# Patient Record
Sex: MALE | Race: WHITE | NOT HISPANIC OR LATINO | Employment: FULL TIME | URBAN - METROPOLITAN AREA
[De-identification: names, ages, dates, MRNs, and addresses within clinical notes are randomized per-mention and may not be internally consistent; named-entity substitution may affect disease eponyms.]

---

## 2017-01-09 DIAGNOSIS — J45.30 MILD PERSISTENT ASTHMA: Primary | ICD-10-CM

## 2017-01-09 DIAGNOSIS — E66.3 OVERWEIGHT (BMI 25.0-29.9): ICD-10-CM

## 2017-01-09 DIAGNOSIS — I10 HYPERTENSION GOAL BP (BLOOD PRESSURE) < 140/90: ICD-10-CM

## 2017-01-09 LAB
ANION GAP SERPL CALCULATED.3IONS-SCNC: 4 MMOL/L (ref 3–14)
BUN SERPL-MCNC: 10 MG/DL (ref 7–30)
CALCIUM SERPL-MCNC: 9.2 MG/DL (ref 8.5–10.1)
CHLORIDE SERPL-SCNC: 106 MMOL/L (ref 94–109)
CHOLEST SERPL-MCNC: 216 MG/DL
CO2 SERPL-SCNC: 30 MMOL/L (ref 20–32)
CREAT SERPL-MCNC: 0.96 MG/DL (ref 0.66–1.25)
GFR SERPL CREATININE-BSD FRML MDRD: 82 ML/MIN/1.7M2
GLUCOSE SERPL-MCNC: 100 MG/DL (ref 70–99)
HDLC SERPL-MCNC: 47 MG/DL
LDLC SERPL CALC-MCNC: 130 MG/DL
NONHDLC SERPL-MCNC: 169 MG/DL
POTASSIUM SERPL-SCNC: 4.2 MMOL/L (ref 3.4–5.3)
SODIUM SERPL-SCNC: 140 MMOL/L (ref 133–144)
TRIGL SERPL-MCNC: 197 MG/DL

## 2017-01-09 PROCEDURE — 80061 LIPID PANEL: CPT | Performed by: PHYSICIAN ASSISTANT

## 2017-01-09 PROCEDURE — 36415 COLL VENOUS BLD VENIPUNCTURE: CPT | Performed by: PHYSICIAN ASSISTANT

## 2017-01-09 PROCEDURE — 80048 BASIC METABOLIC PNL TOTAL CA: CPT | Performed by: PHYSICIAN ASSISTANT

## 2017-01-09 RX ORDER — ALBUTEROL SULFATE 90 UG/1
1-2 AEROSOL, METERED RESPIRATORY (INHALATION) EVERY 6 HOURS PRN
Qty: 3 INHALER | Refills: 1 | Status: SHIPPED | OUTPATIENT
Start: 2017-01-09 | End: 2017-12-21

## 2017-01-09 NOTE — TELEPHONE ENCOUNTER
Prescription approved per [facility] Refill Protocol.  Cheryl Barrios, RN  Triage Nurse

## 2017-01-09 NOTE — TELEPHONE ENCOUNTER
Albuterol       Last Written Prescription Date: 12/9/14  Last Fill Quantity: 3, # refills: 0    Last Office Visit with G, P or [facility] prescribing provider:  12/20/16  Future Office Visit:       Date of Last Asthma Action Plan Letter:   Asthma Action Plan Q1 Year    Topic Date Due     Asthma Action Plan - yearly  12/15/2016      Asthma Control Test:   ACT Total Scores 11/7/2016   ACT TOTAL SCORE -   ASTHMA ER VISITS -   ASTHMA HOSPITALIZATIONS -   ACT TOTAL SCORE (Goal Greater than or Equal to 20) 25   In the past 12 months, how many times did you visit the emergency room for your asthma without being admitted to the hospital? 0   In the past 12 months, how many times were you hospitalized overnight because of your asthma? 0       Date of Last Spirometry Test:   No results found for this or any previous visit.

## 2017-01-17 ENCOUNTER — OFFICE VISIT (OUTPATIENT)
Dept: FAMILY MEDICINE | Facility: CLINIC | Age: 52
End: 2017-01-17
Payer: COMMERCIAL

## 2017-01-17 ENCOUNTER — TRANSFERRED RECORDS (OUTPATIENT)
Dept: HEALTH INFORMATION MANAGEMENT | Facility: CLINIC | Age: 52
End: 2017-01-17

## 2017-01-17 DIAGNOSIS — L30.9 ECZEMA, UNSPECIFIED TYPE: ICD-10-CM

## 2017-01-17 DIAGNOSIS — L30.9 DERMATITIS: Primary | ICD-10-CM

## 2017-01-17 LAB
BASOPHILS # BLD AUTO: 0.1 10E9/L (ref 0–0.2)
BASOPHILS NFR BLD AUTO: 0.7 %
DIFFERENTIAL METHOD BLD: NORMAL
EOSINOPHIL # BLD AUTO: 0.5 10E9/L (ref 0–0.7)
EOSINOPHIL NFR BLD AUTO: 6.7 %
ERYTHROCYTE [DISTWIDTH] IN BLOOD BY AUTOMATED COUNT: 13 % (ref 10–15)
HCT VFR BLD AUTO: 42 % (ref 40–53)
HGB BLD-MCNC: 14.1 G/DL (ref 13.3–17.7)
LYMPHOCYTES # BLD AUTO: 2.4 10E9/L (ref 0.8–5.3)
LYMPHOCYTES NFR BLD AUTO: 32.5 %
MCH RBC QN AUTO: 28.8 PG (ref 26.5–33)
MCHC RBC AUTO-ENTMCNC: 33.6 G/DL (ref 31.5–36.5)
MCV RBC AUTO: 86 FL (ref 78–100)
MONOCYTES # BLD AUTO: 1.1 10E9/L (ref 0–1.3)
MONOCYTES NFR BLD AUTO: 14.6 %
NEUTROPHILS # BLD AUTO: 3.3 10E9/L (ref 1.6–8.3)
NEUTROPHILS NFR BLD AUTO: 45.5 %
PLATELET # BLD AUTO: 264 10E9/L (ref 150–450)
RBC # BLD AUTO: 4.9 10E12/L (ref 4.4–5.9)
WBC # BLD AUTO: 7.3 10E9/L (ref 4–11)

## 2017-01-17 PROCEDURE — 11101 HC BIOPSY SKIN/SUBQ/MUC MEM, EACH ADDTL LESION: CPT | Performed by: FAMILY MEDICINE

## 2017-01-17 PROCEDURE — 99000 SPECIMEN HANDLING OFFICE-LAB: CPT | Performed by: FAMILY MEDICINE

## 2017-01-17 PROCEDURE — 99214 OFFICE O/P EST MOD 30 MIN: CPT | Mod: 25 | Performed by: FAMILY MEDICINE

## 2017-01-17 PROCEDURE — 11100 HC BIOPSY SKIN/SUBQ/MUC MEM, SINGLE LESION: CPT | Performed by: FAMILY MEDICINE

## 2017-01-17 PROCEDURE — 36415 COLL VENOUS BLD VENIPUNCTURE: CPT | Performed by: FAMILY MEDICINE

## 2017-01-17 PROCEDURE — 86140 C-REACTIVE PROTEIN: CPT | Performed by: FAMILY MEDICINE

## 2017-01-17 PROCEDURE — 80050 GENERAL HEALTH PANEL: CPT | Performed by: FAMILY MEDICINE

## 2017-01-17 PROCEDURE — 86038 ANTINUCLEAR ANTIBODIES: CPT | Performed by: FAMILY MEDICINE

## 2017-01-17 PROCEDURE — 88305 TISSUE EXAM BY PATHOLOGIST: CPT | Mod: 90 | Performed by: FAMILY MEDICINE

## 2017-01-17 RX ORDER — BETAMETHASONE DIPROPIONATE 0.5 MG/G
CREAM TOPICAL
Qty: 45 G | Refills: 1 | Status: SHIPPED | OUTPATIENT
Start: 2017-01-17 | End: 2019-09-06

## 2017-01-17 NOTE — PROGRESS NOTES
[facility] - PRIMARY CARE SKIN    CC : Rash   SUBJECTIVE:                                                    Sterling Canales is a 51 year old male who presents to clinic today for follow up of itchy red rash that started about 4 months ago. It involves the trunk, arms, legs. Biopsy and DIF in 11/2016 - negative DIF, spongiotic dermatitis.   Treatment consisted of prednisone and then topical betamethasone diproprinate 0.05% . The rash improved on the prednisone but never cleared. It continues to be itchy.    Medications : aubagio ( for MS ) , amlodipine, verapamil     Refer to electronic medical record (EMR) for past medical history and medications.      ROS : 14 point review of systems was negative except the symptoms listed above in the HPI. Good health, no unexplained weight loss           OBJECTIVE:                                                    GENERAL: healthy, alert and no distress  SKIN: Tran Skin Type - I.  Face, Neck, Trunk, Arms, Legs, Hands and Fingers were examined. The dermatoscope was used to help evaluate pigmented lesions.  Skin Pertinent Findings:  Right anterior lower leg , left posterior lower leg, left posterior thigh- 2 cm scaly, erythematous patches  Scattered 5 mm erythematous scaly patches on the trunk, arms and legs    Labs Pending    MDM : recommended second biopsy to help clarify the etiology, previous biopsy consistent with spongiotic dermatitis however the oral prednisone and topical steroid have not been effective ? Medication reaction ? other      ASSESSMENT:                                                      Encounter Diagnoses   Name Primary?     Dermatitis Yes     Eczema, unspecified type            PLAN:                                                    Patient Instructions   FUTURE APPOINTMENTS  Follow up in 7-10 days for suture removal and to see me.     Betamethasone diproprinate 0.05% cream- apply two times per day to affected areas, not for face or  "groin    Cetirizine 10 mg 1 tab orally two times per day.    WOUND CARE INSTRUCTIONS  1. After 24 hours, change dressing daily.  2. Wash hands before every dressing change.  3. Wash the wound area with a mild soap, then rinse  4. Gently pat dry with a sterile gauze or Q-tip.  5. Apply Vaseline, Aquaphor, Polysporin ointment or Bacitracin ointment over entire wound. Do NOT use Neosporin - as many people react to neomycin.  6. Finally, cover with a bandage or sterile non-stick gauze with micropore paper tape.  7. Repeat once daily until wound has healed.    Do not let the wound dry out.  The wound will heal faster and with better cosmetic results if routinely kept moist with step #5. It is a false belief that a wound heals better when it is exposed to air and allowed to dry out.      Soap, water and shampoo will not hurt this area.    Do not go swimming or take baths, but showering is encouraged.    Limit use of the area where the procedure was done for a few days to allow for optimal healing.    If you experience bleeding:  Repeat steps #2-5 and hold firm pressure on the area for 10 minutes without checking to see if the bleeding has stopped. \"Checking\" pulls off the protective wound clot and restarts the bleeding all over again. Re-apply pressure for 10 minutes if necessary to stop bleeding.  Use additional sterile gauze and tape to maintain pressure once bleeding has stopped.    Signs of Infection:  Infection can occur in any area where skin has been disrupted.  If you notice persistent redness, swelling, colored drainage, increasing pain, fever or other signs of infection, please call us at: [phone] and ask to have me or my colleague paged. We will call you back to discuss.    Pathology Results:  You will be notified, generally via letter or MyChart, in approximately 10 days. If there is anything we need to discuss or further treatment needed, I will call you to discuss it.        PATIENT INFORMATION : " WOUNDS  During the healing process you will notice a number of changes. All wounds develop a small halo of redness surrounding the wound.  This means healing is occurring. Severe itching with extensive redness usually indicates sensitivity to the ointment or bandage tape used to dress the wound.  You should call our office if this develops.      Swelling  and/or discoloration around your surgical site is common, particularly when performed around the eye.    All wounds normally drain.  The larger the wound the more drainage there will be.  After 7-10 days, you will notice the wound beginning to shrink and new skin will begin to grow.  The wound is healed when you can see skin has formed over the entire area.  A healed wound has a healthy, shiny look to the surface and is red to dark pink in color to normalize.  Wounds may take approximately 4-6 weeks to heal.  Larger wounds may take 6-8 weeks. After the wound is healed you may discontinue dressing changes.    You may experience a sensation of tightness as your wound heals. This is normal and will gradually subside.    Your healed wound may be sensitive to temperature changes. This sensitivity improves with time, but if you re having a lot of discomfort, try to avoid temperature extremes.    Patients frequently experience itching after their wound appears to have healed because of the continue healing under the skin.  Plain Vaseline will help relieve the itching.                The patient was counseled to use products free of fragrance, dyes, and plants. The importance of using bland cleansers and the regular use of heavy bland emollient creams was impressed upon the patient.      PROCEDURES:                                                      Name : Punch Biopsy  Indication : Biopsy for pathology to evaluate tissue.  Location(s) : base of neck, upper back  Completed by : Blanca Mcguire MD  Photo Taken : no.  Anesthesia : Patient was anesthetized by infiltrating the  area surrounding the lesion with 1% lidocaine with epinephrine [id number] buffered with bicarbonate and clindamycin 150 mg/mL  Note : Discussed the risk of pain, infection, scarring, hypo- or hyperpigmentation and recurrence or need for re-treatment. The benefits of treatment and alternative treatments were also discussed.    During this procedure, the universal protocol was utilized. The patient's identity was confirmed by no less than two patient identifiers, correct procedure was verified, correct site was verified and marked as applicable and a final pause was completed.    Sterile technique was used throughout the procedure. The skin was cleaned and prepped with surgical cleanser. Once adequate anesthesia was obtained, the lesion was biopsied using a 4mm punch and appropriate skin traction. The specimen was sent to pathology.    Direct pressure and monopolar cautery was applied for hemostasis. The skin was closed with simple interrupted sutures of 4-0 Prolene. No bleeding was present upon the completion of the procedure. The wound was coated with antibacterial ointment. A dry sterile dressing was applied.      Name : Direct Immunofluorescence Pathology (DIF)  Note : A punch biopsy was performed with the technique above. A perilesional biopsy was performed using a 4 millimeter punch with appropriate skin traction. Direct pressure was applied for hemostasis. The specimen was sent to pathology. Both areas were incorporated into single defect.  Total number of stitches in closure of epidermis : 3    Primary provider and referring provider will be informed regarding the wound culture and tissue sample report when it returns.    Suture removal : 7-10 days.        TT : 25 minutes.  CT : 20 minutes.

## 2017-01-17 NOTE — MR AVS SNAPSHOT
"              After Visit Summary   1/17/2017    Sterling Canales    MRN: [id number]           Patient Information     Date Of Birth          1965        Visit Information        Provider Department      1/17/2017 4:00 PM Lillian Mcguire MD [facility] - Primary Care Skin        Today's Diagnoses     Dermatitis    -  1     Eczema, unspecified type           Care Instructions    FUTURE APPOINTMENTS  Follow up in 7-10 days for suture removal and to see me.     Betamethasone diproprinate 0.05% cream- apply two times per day to affected areas, not for face or groin    Cetirizine 10 mg 1 tab orally two times per day.    WOUND CARE INSTRUCTIONS  1. After 24 hours, change dressing daily.  2. Wash hands before every dressing change.  3. Wash the wound area with a mild soap, then rinse  4. Gently pat dry with a sterile gauze or Q-tip.  5. Apply Vaseline, Aquaphor, Polysporin ointment or Bacitracin ointment over entire wound. Do NOT use Neosporin - as many people react to neomycin.  6. Finally, cover with a bandage or sterile non-stick gauze with micropore paper tape.  7. Repeat once daily until wound has healed.    Do not let the wound dry out.  The wound will heal faster and with better cosmetic results if routinely kept moist with step #5. It is a false belief that a wound heals better when it is exposed to air and allowed to dry out.      Soap, water and shampoo will not hurt this area.    Do not go swimming or take baths, but showering is encouraged.    Limit use of the area where the procedure was done for a few days to allow for optimal healing.    If you experience bleeding:  Repeat steps #2-5 and hold firm pressure on the area for 10 minutes without checking to see if the bleeding has stopped. \"Checking\" pulls off the protective wound clot and restarts the bleeding all over again. Re-apply pressure for 10 minutes if necessary to stop bleeding.  Use additional sterile gauze and tape to maintain " pressure once bleeding has stopped.    Signs of Infection:  Infection can occur in any area where skin has been disrupted.  If you notice persistent redness, swelling, colored drainage, increasing pain, fever or other signs of infection, please call us at: [phone] and ask to have me or my colleague paged. We will call you back to discuss.    Pathology Results:  You will be notified, generally via letter or MyChart, in approximately 10 days. If there is anything we need to discuss or further treatment needed, I will call you to discuss it.        PATIENT INFORMATION : WOUNDS  During the healing process you will notice a number of changes. All wounds develop a small halo of redness surrounding the wound.  This means healing is occurring. Severe itching with extensive redness usually indicates sensitivity to the ointment or bandage tape used to dress the wound.  You should call our office if this develops.      Swelling  and/or discoloration around your surgical site is common, particularly when performed around the eye.    All wounds normally drain.  The larger the wound the more drainage there will be.  After 7-10 days, you will notice the wound beginning to shrink and new skin will begin to grow.  The wound is healed when you can see skin has formed over the entire area.  A healed wound has a healthy, shiny look to the surface and is red to dark pink in color to normalize.  Wounds may take approximately 4-6 weeks to heal.  Larger wounds may take 6-8 weeks. After the wound is healed you may discontinue dressing changes.    You may experience a sensation of tightness as your wound heals. This is normal and will gradually subside.    Your healed wound may be sensitive to temperature changes. This sensitivity improves with time, but if you re having a lot of discomfort, try to avoid temperature extremes.    Patients frequently experience itching after their wound appears to have healed because of the continue  healing under the skin.  Plain Vaseline will help relieve the itching.              Follow-ups after your visit        Who to contact     If you have questions or need follow up information about today's clinic visit or your schedule please contact [facility] - PRIMARY CARE SKIN directly at [phone].  Normal or non-critical lab and imaging results will be communicated to you by [email or web address], letter or phone within 4 business days after the clinic has received the results. If you do not hear from us within 7 days, please contact the clinic through [email or web address] or phone. If you have a critical or abnormal lab result, we will notify you by phone as soon as possible.  Submit refill requests through Fliqz or call your pharmacy and they will forward the refill request to us. Please allow 3 business days for your refill to be completed.          Additional Information About Your Visit        MyChart Information     F[employer]z gives you secure access to your electronic health record. If you see a primary care provider, you can also send messages to your care team and make appointments. If you have questions, please call your primary care clinic.  If you do not have a primary care provider, please call [phone] and they will assist you.        Care EveryWhere ID     This is your Care EveryWhere ID. This could be used by other organizations to access your [facility] medical records  [id number]         Blood Pressure from Last 3 Encounters:   12/20/16 136/82   11/07/16 128/80   12/15/15 136/84    Weight from Last 3 Encounters:   12/20/16 189 lb 8 oz (85.957 kg)   11/07/16 185 lb 3.2 oz (84.006 kg)   12/15/15 183 lb 11.2 oz (83.326 kg)              We Performed the Following     Antinuclear antibody screen by EIA     BIOPSY SKIN/SUBQ/MUC MEM, EACH ADDTL LESION     BIOPSY SKIN/SUBQ/MUC MEM, SINGLE LESION     CBC with platelets differential     CL SURG PATH [facility] DERM     Comprehensive metabolic panel     CRP  inflammation     Direct Immunofluorescence Pathology: Laboratory Miscellaneous Order     Send outs misc test     TSH          Today's Medication Changes          These changes are accurate as of: 1/17/17  4:22 PM.  If you have any questions, ask your nurse or doctor.               These medicines have changed or have updated prescriptions.        Dose/Directions    betamethasone dipropionate 0.05 % cream   Commonly known as:  DIPROSONE   This may have changed:  See the new instructions.   Used for:  Eczema, unspecified type   Changed by:  Lillian Mcguire MD        APPLY TO AFFECTED AREAS ON ARMS, LEGS OR TRUNK TWICE DAILY FOR 10-14 CONSECUTIVE DAYS. NOT TO BE USED ON FACE OR GROIN.   Quantity:  45 g   Refills:  1            Where to get your medicines      These medications [id number] [address] [place] [address] & [facility]  [address], [facility] [address]     Phone:  [phone]    - betamethasone dipropionate 0.05 % cream             Primary Care Provider Office Phone # Fax #    Donnell Arroyo PA-C [phone]       [facility] [address]  [facility] [address]        Thank you!     Thank you for choosing [facility] - PRIMARY CARE SKIN  for your care. Our goal is always to provide you with excellent care. Hearing back from our patients is one way we can continue to improve our services. Please take a few minutes to complete the written survey that you may receive in the mail after your visit with us. Thank you!             Your Updated Medication List - Protect others around you: Learn how to safely use, store and throw away your medicines at www.disposemymeds.org.          This list is accurate as of: 1/17/17  4:22 PM.  Always use your most recent med list.                   Brand Name Dispense Instructions for use    albuterol 108 (90 BASE) MCG/ACT Inhaler    PROVENTIL HFA    3  Inhaler    Inhale 1-2 puffs into the lungs every 6 hours as needed for shortness of breath / dyspnea       amLODIPine 5 MG tablet    NORVASC    90 tablet    Take 1 tablet (5 mg) by mouth daily       Ascorbic Acid 1000 MG Tbcr      Take  by mouth.       AUBAGIO 14 MG tablet   Generic drug:  teriflunomide      Take 14 mg by mouth daily Only available through select specialty pharmacies       betamethasone dipropionate 0.05 % cream    DIPROSONE    45 g    APPLY TO AFFECTED AREAS ON ARMS, LEGS OR TRUNK TWICE DAILY FOR 10-14 CONSECUTIVE DAYS. NOT TO BE USED ON FACE OR GROIN.       cholecalciferol 1000 UNIT tablet    vitamin D    100 tablet    Take 2 tablets (2,000 Units) by mouth daily       montelukast 10 MG tablet    SINGULAIR    90 tablet    Take 1 tablet (10 mg) by mouth At Bedtime       ONE-A-DAY ESSENTIAL PO      Take  by mouth.       verapamil HCl  MG Cp24     30 capsule    Take 360 mg by mouth daily

## 2017-01-17 NOTE — PATIENT INSTRUCTIONS
"FUTURE APPOINTMENTS  Follow up in 7-10 days for suture removal and to see me.     Betamethasone diproprinate 0.05% cream- apply two times per day to affected areas, not for face or groin    Cetirizine 10 mg 1 tab orally two times per day.    WOUND CARE INSTRUCTIONS  1. After 24 hours, change dressing daily.  2. Wash hands before every dressing change.  3. Wash the wound area with a mild soap, then rinse  4. Gently pat dry with a sterile gauze or Q-tip.  5. Apply Vaseline, Aquaphor, Polysporin ointment or Bacitracin ointment over entire wound. Do NOT use Neosporin - as many people react to neomycin.  6. Finally, cover with a bandage or sterile non-stick gauze with micropore paper tape.  7. Repeat once daily until wound has healed.    Do not let the wound dry out.  The wound will heal faster and with better cosmetic results if routinely kept moist with step #5. It is a false belief that a wound heals better when it is exposed to air and allowed to dry out.      Soap, water and shampoo will not hurt this area.    Do not go swimming or take baths, but showering is encouraged.    Limit use of the area where the procedure was done for a few days to allow for optimal healing.    If you experience bleeding:  Repeat steps #2-5 and hold firm pressure on the area for 10 minutes without checking to see if the bleeding has stopped. \"Checking\" pulls off the protective wound clot and restarts the bleeding all over again. Re-apply pressure for 10 minutes if necessary to stop bleeding.  Use additional sterile gauze and tape to maintain pressure once bleeding has stopped.    Signs of Infection:  Infection can occur in any area where skin has been disrupted.  If you notice persistent redness, swelling, colored drainage, increasing pain, fever or other signs of infection, please call us at: [phone] and ask to have me or my colleague paged. We will call you back to discuss.    Pathology Results:  You will be notified, generally " via letter or MyChart, in approximately 10 days. If there is anything we need to discuss or further treatment needed, I will call you to discuss it.        PATIENT INFORMATION : WOUNDS  During the healing process you will notice a number of changes. All wounds develop a small halo of redness surrounding the wound.  This means healing is occurring. Severe itching with extensive redness usually indicates sensitivity to the ointment or bandage tape used to dress the wound.  You should call our office if this develops.      Swelling  and/or discoloration around your surgical site is common, particularly when performed around the eye.    All wounds normally drain.  The larger the wound the more drainage there will be.  After 7-10 days, you will notice the wound beginning to shrink and new skin will begin to grow.  The wound is healed when you can see skin has formed over the entire area.  A healed wound has a healthy, shiny look to the surface and is red to dark pink in color to normalize.  Wounds may take approximately 4-6 weeks to heal.  Larger wounds may take 6-8 weeks. After the wound is healed you may discontinue dressing changes.    You may experience a sensation of tightness as your wound heals. This is normal and will gradually subside.    Your healed wound may be sensitive to temperature changes. This sensitivity improves with time, but if you re having a lot of discomfort, try to avoid temperature extremes.    Patients frequently experience itching after their wound appears to have healed because of the continue healing under the skin.  Plain Vaseline will help relieve the itching.

## 2017-01-18 LAB
ALBUMIN SERPL-MCNC: 4.2 G/DL (ref 3.4–5)
ALP SERPL-CCNC: 58 U/L (ref 40–150)
ALT SERPL W P-5'-P-CCNC: 39 U/L (ref 0–70)
ANION GAP SERPL CALCULATED.3IONS-SCNC: 8 MMOL/L (ref 3–14)
AST SERPL W P-5'-P-CCNC: 20 U/L (ref 0–45)
BILIRUB SERPL-MCNC: 0.6 MG/DL (ref 0.2–1.3)
BUN SERPL-MCNC: 14 MG/DL (ref 7–30)
CALCIUM SERPL-MCNC: 9.5 MG/DL (ref 8.5–10.1)
CHLORIDE SERPL-SCNC: 105 MMOL/L (ref 94–109)
CO2 SERPL-SCNC: 26 MMOL/L (ref 20–32)
CREAT SERPL-MCNC: 0.97 MG/DL (ref 0.66–1.25)
CRP SERPL-MCNC: <2.9 MG/L (ref 0–8)
GFR SERPL CREATININE-BSD FRML MDRD: 82 ML/MIN/1.7M2
GLUCOSE SERPL-MCNC: 107 MG/DL (ref 70–99)
POTASSIUM SERPL-SCNC: 3.9 MMOL/L (ref 3.4–5.3)
PROT SERPL-MCNC: 7.3 G/DL (ref 6.8–8.8)
SODIUM SERPL-SCNC: 139 MMOL/L (ref 133–144)
TSH SERPL DL<=0.05 MIU/L-ACNC: 0.92 MU/L (ref 0.4–4)

## 2017-01-19 LAB — ANA SER QL IA: NORMAL

## 2017-01-24 ENCOUNTER — OFFICE VISIT (OUTPATIENT)
Dept: FAMILY MEDICINE | Facility: CLINIC | Age: 52
End: 2017-01-24
Payer: COMMERCIAL

## 2017-01-24 DIAGNOSIS — L30.9 DERMATITIS: Primary | ICD-10-CM

## 2017-01-24 PROCEDURE — 99213 OFFICE O/P EST LOW 20 MIN: CPT | Performed by: FAMILY MEDICINE

## 2017-01-24 NOTE — PATIENT INSTRUCTIONS
FUTURE APPOINTMENTS  Follow up with referral to Dr. Quiles.    ORAL ANTIHISTAMINE  Continue taking by mouth, 1 tablet(s) of OTC cetirizine (Zyrtec) 10 mg twice per day.    TOPICAL STEROID INSTRUCTIONS  Continue application of betamethasone dipropionate 0.05% cream twice daily.

## 2017-01-24 NOTE — MR AVS SNAPSHOT
After Visit Summary   1/24/2017    Sterling Canales    MRN: [id number]           Patient Information     Date Of Birth          1965        Visit Information        Provider Department      1/24/2017 4:00 PM Lillian Mcguire MD [facility]        Today's Diagnoses     Dermatitis    -  1       Care Instructions    FUTURE APPOINTMENTS  Follow up with referral to Dr. Quiles.    ORAL ANTIHISTAMINE  Continue taking by mouth, 1 tablet(s) of OTC cetirizine (Zyrtec) 10 mg twice per day.    TOPICAL STEROID INSTRUCTIONS  Continue application of betamethasone dipropionate 0.05% cream twice daily.        Follow-ups after your visit        Additional Services     DERMATOLOGY REFERRAL       Your provider has referred you to, because of possible psoriasiform dermatitis  Dr. Johnson Quiles MD, PhD  FMG: [facility] [phone]   [email or web address]    Please be aware that coverage of these services is subject to the terms and limitations of your health insurance plan.  Call member services at your health plan with any benefit or coverage questions.      Please bring the following with you to your appointment:    (1) Any X-Rays, CTs or MRIs which have been performed.  Contact the facility where they were done to arrange for  prior to your scheduled appointment.  Any new CT, MRI or other procedures ordered by your specialist must be performed at a [facility] facility or coordinated by your clinic's referral office.  (2) List of current medications  (3) This referral request   (4) Any documents/labs given to you for this referral                  Your next 10 appointments already scheduled     Jan 24, 2017  4:00 PM   Office Visit with Lillian Mcguire MD   [facility] ([facility] )    [address] [place] [address]  [address] [place] [id number]    [phone]           Bring a current list of meds and any records pertaining to this visit.  For Physicals, please bring immunization records and any forms needing to be filled out.  Please arrive 10 minutes early to complete paperwork.              Who to contact     If you have questions or need follow up information about today's clinic visit or your schedule please contact [facility] - PRIMARY C[facility] SK[facility] directly at [phone].  Normal or non-critical lab and imaging results will be communicated to you by Mozidohart, letter or phone within 4 business days after the clinic has received the results. If you do not hear from us within 7 days, please contact the clinic through Practice Fusion[facility] or phone. If you have a critical or abnormal lab result, we will notify you by phone as soon as possible.  Submit refill requests through [employer] or call your pharmacy and they will forward the refill request to us. Please allow 3 business days for your refill to be completed.          Additional Information About Your Visit        Mozidohart Information     [employer] gives you secure access to your electronic health record. If you see a primary care provider, you can also send messages to your care team and make appointments. If you have questions, please call your primary care clinic.  If you do not have a primary care provider, please call [phone] and they will assist you.        Care EveryWhere ID     This is your Care EveryWhere ID. This could be used by other organizations to access your [place] medical records  [id number]         Blood Pressure from Last 3 Encounters:   12/20/16 136/82   11/07/16 128/80   12/15/15 136/84    Weight from Last 3 Encounters:   12/20/16 189 lb 8 oz (85.957 kg)   11/07/16 185 lb 3.2 oz (84.006 kg)   12/15/15 183 lb 11.2 oz (83.326 kg)              We Performed the Following     DERMATOLOGY REFERRAL        Primary Care Provider Office Phone # Fax #    Donnell Arroyo PA-C [phone]  [phone]       [facility] [address]  [facility] [address]        Thank you!     Thank you for choosing [facility] - PRIMARY CARE SK[facility]  for your care. Our goal is always to provide you with excellent care. Hearing back from our patients is one way we can continue to improve our services. Please take a few minutes to complete the written survey that you may receive in the mail after your visit with us. Thank you!             Your Updated Medication List - Protect others around you: Learn how to safely use, store and throw away your medicines at www.disposemymeds.org.          This list is accurate as of: 1/24/17  3:52 PM.  Always use your most recent med list.                   Brand Name Dispense Instructions for use    albuterol 108 (90 BASE) MCG/ACT Inhaler    PROVENTIL HFA    3 Inhaler    Inhale 1-2 puffs into the lungs every 6 hours as needed for shortness of breath / dyspnea       amLODIPine 5 MG tablet    NORVASC    90 tablet    Take 1 tablet (5 mg) by mouth daily       Ascorbic Acid 1000 MG Tbcr      Take  by mouth.       AUBAGIO 14 MG tablet   Generic drug:  teriflunomide      Take 14 mg by mouth daily Only available through select specialty pharmacies       betamethasone dipropionate 0.05 % cream    DIPROSONE    45 g    APPLY TO AFFECTED AREAS ON ARMS, LEGS OR TRUNK TWICE DAILY FOR 10-14 CONSECUTIVE DAYS. NOT TO BE USED ON FACE OR GROIN.       cholecalciferol 1000 UNIT tablet    vitamin D    100 tablet    Take 2 tablets (2,000 Units) by mouth daily       montelukast 10 MG tablet    SINGULAIR    90 tablet    Take 1 tablet (10 mg) by mouth At Bedtime       ONE-A-DAY ESSENTIAL PO      Take  by mouth.       verapamil HCl  MG Cp24     30 capsule    Take 360 mg by mouth daily

## 2017-01-24 NOTE — PROGRESS NOTES
[facility] - PRIMARY CARE SK[facility]    CC : Rash   SUBJECTIVE:                                                    Sterling Canales is a 51 year old male with a history of MS and on Aubagio, who presents to clinic today for follow up of an itchy red rash that started about 4 months ago, involving the trunk, arms, and legs. Itchiness has not diminished, but it is tolerable. 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 Treatment consisted of oral taper of prednisone and topical betamethasone diproprinate 0.05% . The rash improved but did not clear.     Because of the persistence and generalized distribution on the trunk, arms , legs a repeat biopsy was done on  01/17/2017 , see below.    Current treatment : betamethasone dipropionate 0.05% cream applied BID, cetirizine 10 mg po BID.  Response to treatment : Rash has not changed with current treatment plan.    Tissue Pathology Report from 1/17/2017    Biopsy and DIF in 11/2016 - negative DIF, spongiotic dermatitis.     Previous therapies include : prednisone, topical betamethasone dipropionate 0.05% cream.  Other current medications : Aubagio ( for MS ), amlodipine, verapamil     Refer to electronic medical record (EMR) for past medical history and medications.    ROS : 14 point review of systems was negative except the symptoms listed above in the HPI.   This document serves as a record of the services and decisions personally performed and made by Blanca Mcguire MD. It was created on her behalf by Jose A Wilson, a trained medical scribe.  The creation of this document is based on the scribe's personal observations and the provider's statements to the medical scribe.  Jose A Wilson, January 24, 2017 3:45 PM      OBJECTIVE:                                                    GENERAL: healthy, alert and no distress  SKIN: Tran Skin Type - I.  Face, Neck, Trunk, Arms, Legs, Hands and Fingers were examined. The dermatoscope was used  to help evaluate pigmented lesions.  Skin Pertinent Findings:  Right anterior lower leg, left posterior lower leg, left posterior thigh : 2 cm scaly, erythematous patches.    Scattered 5 mm erythematous scaly patches on the trunk, arms and legs. Light scaling.    MDM : Still some uncertainty regarding psoriasis vs eczema because of that I will arrange for consult with Dr. Quiles .       ASSESSMENT:                                                      Encounter Diagnosis   Name Primary?     Dermatitis Yes         PLAN:                                                    Patient Instructions   FUTURE APPOINTMENTS  Consult with Dr. Quiles has been arranged.    ORAL ANTIHISTAMINE  Continue taking by mouth, 1 tablet(s) of OTC cetirizine (Zyrtec) 10 mg twice per day.    TOPICAL STEROID INSTRUCTIONS  Continue application of betamethasone dipropionate 0.05% cream twice daily.          PROCEDURES:                                                    None.    TT: 20 minutes.  CT: 15 minutes.      The information in this document, created by the medical scribe for me, accurately reflects the services I personally performed and the decisions made by me. I have reviewed and approved this document for accuracy prior to leaving the patient care area.  Blanca Mcguire MD January 24, 2017 3:46 PM

## 2017-02-01 LAB
LOCATION PERFORMED: NORMAL
MISCELLANEOUS TEST: NORMAL
NORMAL RANGE FOR SEND OUTS MISC TEST: NORMAL
RESULT: NORMAL
SEND OUTS MISC TEST CODE: NORMAL
SEND OUTS MISC TEST SPECIMEN: NORMAL
TEST NAME: NORMAL

## 2017-03-01 ENCOUNTER — TELEPHONE (OUTPATIENT)
Dept: FAMILY MEDICINE | Facility: CLINIC | Age: 52
End: 2017-03-01

## 2017-03-01 NOTE — TELEPHONE ENCOUNTER
Pt. Calls back by himself as he states his wife had called earlier. No CTC on file.     Sterling Canales is a 52 year old male who calls with edema in both legs.    NURSING ASSESSMENT:  Description:  Swelling in both legs that will leave a divot when pressing down with his thumb and slow return to normal. Pt. Denies warmth. No redness but is still having ezcema rash on legs.  Onset/duration:  1 week  Precip. factors:  Traveled by plane arriving back to [place] on Sunday.   Associated symptoms:  Blood pressure has been fluctuation. Had added new BP medication Last BP he believes was: 145/94. Denies SOB or pain to BLE.  Improves/worsens symptoms:  On his feet all day at work which notices mores swelling. Spouse told him to elevate legs. Pt. Has been monitoring his salt intake but not so much over this vacation.  Pain scale (0-10)   0/10  LMP/preg/breast feeding:  N/A  Last exam/Treatment:  12/20/2016  Allergies:   Allergies   Allergen Reactions     Pertussis Vaccine        MEDICATIONS:   Taking medication(s) as prescribed? Yes  Taking over the counter medication(s?) Yes OTC allergy medication  Any medication side effects? Not Applicable    Any barriers to taking medication(s) as prescribed?  No  Medication(s) improving/managing symptoms?  No  Medication reconciliation completed: Yes      NURSING PLAN: Nursing advice to patient schedule OV with PCP to discuss water retention, change in BP medication, and possible lab work needed.    RECOMMENDED DISPOSITION:  See in 24 hours - Pt. Expressed understanding.     He will continue to monitor BLE until then. Advised pt. to elevate QID and lower Na intake. Also advised to go to ER if develops SOB, change in vision, or pain in BLE.  Will comply with recommendation: Yes  If further questions/concerns or if symptoms do not improve, worsen or new symptoms develop, call your PCP or [facility] as soon as possible.      Guideline used:  Nursing knowledge    Lucy ESQUIVEL  SMILEY Masterson

## 2017-03-02 ENCOUNTER — OFFICE VISIT (OUTPATIENT)
Dept: FAMILY MEDICINE | Facility: CLINIC | Age: 52
End: 2017-03-02
Payer: COMMERCIAL

## 2017-03-02 ENCOUNTER — OFFICE VISIT (OUTPATIENT)
Dept: DERMATOLOGY | Facility: CLINIC | Age: 52
End: 2017-03-02
Payer: COMMERCIAL

## 2017-03-02 VITALS
WEIGHT: 190 LBS | HEIGHT: 69 IN | TEMPERATURE: 97.3 F | DIASTOLIC BLOOD PRESSURE: 76 MMHG | BODY MASS INDEX: 28.14 KG/M2 | OXYGEN SATURATION: 97 % | SYSTOLIC BLOOD PRESSURE: 138 MMHG | HEART RATE: 90 BPM

## 2017-03-02 VITALS — DIASTOLIC BLOOD PRESSURE: 95 MMHG | OXYGEN SATURATION: 94 % | SYSTOLIC BLOOD PRESSURE: 166 MMHG | HEART RATE: 97 BPM

## 2017-03-02 DIAGNOSIS — I10 HYPERTENSION GOAL BP (BLOOD PRESSURE) < 140/90: ICD-10-CM

## 2017-03-02 DIAGNOSIS — G35 MS (MULTIPLE SCLEROSIS) (H): ICD-10-CM

## 2017-03-02 DIAGNOSIS — L20.81 ATOPIC NEURODERMATITIS: Primary | ICD-10-CM

## 2017-03-02 DIAGNOSIS — R60.0 BILATERAL EDEMA OF LOWER EXTREMITY: Primary | ICD-10-CM

## 2017-03-02 DIAGNOSIS — J45.30 MILD PERSISTENT ASTHMA WITHOUT COMPLICATION: ICD-10-CM

## 2017-03-02 PROCEDURE — 99214 OFFICE O/P EST MOD 30 MIN: CPT | Performed by: PHYSICIAN ASSISTANT

## 2017-03-02 PROCEDURE — 99203 OFFICE O/P NEW LOW 30 MIN: CPT | Performed by: DERMATOLOGY

## 2017-03-02 RX ORDER — HYDROCHLOROTHIAZIDE 25 MG/1
12.5 TABLET ORAL DAILY
Qty: 30 TABLET | Refills: 2 | Status: SHIPPED | OUTPATIENT
Start: 2017-03-02 | End: 2017-03-02

## 2017-03-02 RX ORDER — HYDROCHLOROTHIAZIDE 25 MG/1
12.5 TABLET ORAL DAILY
Qty: 30 TABLET | Refills: 2 | Status: SHIPPED | OUTPATIENT
Start: 2017-03-02 | End: 2017-07-17

## 2017-03-02 NOTE — PATIENT INSTRUCTIONS
Proper skin care from Dr. Quiles- [facility]     Eliminate harsh soaps, i.e. Dial, Zest, Pau Spring;   Use mild soaps such as Cetaphil or Dove Sensitive Skin   Avoid hot or cold showers   After showering, lightly dry off.    Aggressive use of a moisturizer (including Vanicream, Cetaphil, Aquafor or Cerave)   We recommend using a tub that needs to be scooped out, not a pump. This has more of an oil base. It will hold moisture in your skin much better than a water base moisturizer. The ones recommended are non- pore clogging.       If you have any questions call [phone] and follow the prompts to Dr. Quiles's office.

## 2017-03-02 NOTE — NURSING NOTE
"Initial BP (!) 166/95  Pulse 97  SpO2 94% Estimated body mass index is 28.19 kg/(m^2) as calculated from the following:    Height as of 12/20/16: 1.746 m (5' 8.75\").    Weight as of 12/20/16: 86 kg (189 lb 8 oz). .      "

## 2017-03-02 NOTE — MR AVS SNAPSHOT
"              After Visit Summary   3/2/2017    Sterling Canales    MRN: [id number]           Patient Information     Date Of Birth          1965        Visit Information        Provider Department      3/2/2017 3:20 PM Donnell Arroyo PA-C [facility]        Today's Diagnoses     Bilateral edema of lower extremity    -  1    Hypertension goal BP (blood pressure) < 140/90        Mild persistent asthma           Follow-ups after your visit        Who to contact     If you have questions or need follow up information about today's clinic visit or your schedule please contact [facility] directly at [phone].  Normal or non-critical lab and imaging results will be communicated to you by FriendsClearhart, letter or phone within 4 business days after the clinic has received the results. If you do not hear from us within 7 days, please contact the clinic through FriendsClearhart or phone. If you have a critical or abnormal lab result, we will notify you by phone as soon as possible.  Submit refill requests through [employer] or call your pharmacy and they will forward the refill request to us. Please allow 3 business days for your refill to be completed.          Additional Information About Your Visit        MyChart Information     [employer] gives you secure access to your electronic health record. If you see a primary care provider, you can also send messages to your care team and make appointments. If you have questions, please call your primary care clinic.  If you do not have a primary care provider, please call [phone] and they will assist you.        Care EveryWhere ID     This is your Care EveryWhere ID. This could be used by other organizations to access your [place] medical records  [id number]        Your Vitals Were     Pulse Temperature Height Pulse Oximetry BMI (Body Mass Index)       90 97.3  F (36.3  C) (Oral) 5' 8.75\" (1.746 m) 97% 28.26 kg/m2        Blood Pressure from Last " 3 Encounters:   03/02/17 138/76   03/02/17 (!) 166/95   12/20/16 136/82    Weight from Last 3 Encounters:   03/02/17 190 lb (86.2 kg)   12/20/16 189 lb 8 oz (86 kg)   11/07/16 185 lb 3.2 oz (84 kg)              We Performed the Following     Asthma Action Plan (AAP)          Today's Medication Changes          These changes are accurate as of: 3/2/17  3:58 PM.  If you have any questions, ask your nurse or doctor.               Start taking these medicines.        Dose/Directions    hydrochlorothiazide 25 MG tablet   Commonly known as:  HYDRODIURIL   Used for:  Bilateral edema of lower extremity, Hypertension goal BP (blood pressure) < 140/90   Started by:  Donnell Arroyo PA-C        Dose:  12.5 mg   Take 0.5 tablets (12.5 mg) by mouth daily   Quantity:  30 tablet   Refills:  2         Stop taking these medicines if you haven't already. Please contact your care team if you have questions.     amLODIPine 5 MG tablet   Commonly known as:  NORVASC   Stopped by:  Donnell Arroyo PA-C                Where to get your medicines      [facility] [address] [facility] [address] [facility] [address] [facility] [address] [facility]  [address] [facility] [address]     Phone:  [phone]     hydrochlorothiazide 25 MG tablet                Primary Care Provider Office Phone # Fax #    Donnell Arroyo PA-C [phone]       [facility] [address]  [facility] [address]        Thank you!     Thank you for choosing [facility]  for your care. Our goal is always to provide you with excellent care. Hearing back from our patients is one way we can continue to improve our services. Please take a few minutes to complete the written survey that you may receive in the mail after your visit with us. Thank you!             Your Updated Medication List - Protect others around you: Learn how to safely use, store and throw away  your medicines at www.disposemymeds.org.          This list is accurate as of: 3/2/17  3:58 PM.  Always use your most recent med list.                   Brand Name Dispense Instructions for use    albuterol 108 (90 BASE) MCG/ACT Inhaler    PROVENTIL HFA    3 Inhaler    Inhale 1-2 puffs into the lungs every 6 hours as needed for shortness of breath / dyspnea       Ascorbic Acid 1000 MG Tbcr      Take  by mouth.       AUBAGIO 14 MG tablet   Generic drug:  teriflunomide      Take 14 mg by mouth daily Only available through select specialty pharmacies       betamethasone dipropionate 0.05 % cream    DIPROSONE    45 g    APPLY TO AFFECTED AREAS ON ARMS, LEGS OR TRUNK TWICE DAILY FOR 10-14 CONSECUTIVE DAYS. NOT TO BE USED ON FACE OR GROIN.       cholecalciferol 1000 UNIT tablet    vitamin D    100 tablet    Take 2 tablets (2,000 Units) by mouth daily       hydrochlorothiazide 25 MG tablet    HYDRODIURIL    30 tablet    Take 0.5 tablets (12.5 mg) by mouth daily       montelukast 10 MG tablet    SINGULAIR    90 tablet    Take 1 tablet (10 mg) by mouth At Bedtime       ONE-A-DAY ESSENTIAL PO      Take  by mouth.       verapamil HCl  MG Cp24     30 capsule    Take 360 mg by mouth daily

## 2017-03-02 NOTE — NURSING NOTE
"Chief Complaint   Patient presents with     Leg Swelling       Initial /84  Pulse 90  Temp 97.3  F (36.3  C) (Oral)  Ht 5' 8.75\" (1.746 m)  Wt 190 lb (86.2 kg)  SpO2 97%  BMI 28.26 kg/m2 Estimated body mass index is 28.26 kg/(m^2) as calculated from the following:    Height as of this encounter: 5' 8.75\" (1.746 m).    Weight as of this encounter: 190 lb (86.2 kg).  Medication Reconciliation: complete   Latrell Marina CMA        "

## 2017-03-02 NOTE — PROGRESS NOTES
Sterling Canales is a 52 year old year old male patient here today in consultation for rash on body by Dr. Mcguire.  Using betamethasone and it is clearing, bx showed spongiosis.  Patient states this has been present for months since nove.  Location all over.  Using dial soap.  .  Using emollients nowq.  Patient reports the following symptoms:  itching .  Patient reports the following previous treatments prednisone, topicals.  Patient reports the following modifying factors none.  Associated symptoms: none.  Patient has no other skin complaints today.  Remainder of the HPI, Meds, PMH, Allergies, FH, and SH was reviewed in chart.      Past Medical History   Diagnosis Date     Allergic rhinitis, cause unspecified      Allergic rhinitis     ASTHMA - MILD PERSISTENT 11/28/2005     Multiple sclerosis (H) 1994     Unspecified essential hypertension      Essential hypertension       Past Surgical History   Procedure Laterality Date     Surgical history of -        lasik bilat     Carpal tunnel release rt/lt  6/08     right     Hernia repair       bilateral inguinal; 2 separate occasions.        Family History   Problem Relation Age of Onset     Hypertension Father      DIABETES Father      type 2     Lipids Mother      Breast Cancer Mother 67     Prostate Cancer Paternal Grandfather      dx'd late 70s     CANCER Brother 35     testicular CA       Social History     Social History     Marital status:      Spouse name: Radha     Number of children: 2     Years of education: N/A     Occupational History     machine dev specialist      [employer]     Social History Main Topics     Smoking status: Never Smoker     Smokeless tobacco: Never Used     Alcohol use 0.0 - 1.0 oz/week     0 - 2 Standard drinks or equivalent per week     Drug use: No     Sexual activity: Yes     Partners: Female      Comment: wife tubal     Other Topics Concern      Service No     Blood Transfusions No     Caffeine Concern Yes     24  oz/day, occ tea     Sleep Concern No     Stress Concern No     Special Diet No      trying to watch salt, calcium daily, dairy per day 0-1     Exercise No     Seat Belt Yes     Self-Exams Yes     Parent/Sibling W/ Cabg, Mi Or Angioplasty Before 65f 55m? No     Social History Narrative       Outpatient Encounter Prescriptions as of 3/2/2017   Medication Sig Dispense Refill     betamethasone dipropionate (DIPROSONE) 0.05 % cream APPLY TO AFFECTED AREAS ON ARMS, LEGS OR TRUNK TWICE DAILY FOR 10-14 CONSECUTIVE DAYS. NOT TO BE USED ON FACE OR GROIN. 45 g 1     albuterol (PROVENTIL HFA) 108 (90 BASE) MCG/ACT Inhaler Inhale 1-2 puffs into the lungs every 6 hours as needed for shortness of breath / dyspnea 3 Inhaler 1     amLODIPine (NORVASC) 5 MG tablet Take 1 tablet (5 mg) by mouth daily 90 tablet 1     montelukast (SINGULAIR) 10 MG tablet Take 1 tablet (10 mg) by mouth At Bedtime 90 tablet 1     teriflunomide (AUBAGIO) 14 MG tablet Take 14 mg by mouth daily Only available through select specialty pharmacies       verapamil HCl  MG CP24 Take 360 mg by mouth daily 30 capsule 5     cholecalciferol (VITAMIN D) 1000 UNIT tablet Take 2 tablets (2,000 Units) by mouth daily 100 tablet 3     Multiple Vitamin (ONE-A-DAY ESSENTIAL PO) Take  by mouth.       Ascorbic Acid (VITAMIN C CR) 1000 MG TBCR Take  by mouth.       No facility-administered encounter medications on file as of 3/2/2017.              Review Of Systems  Skin: As above  Eyes: negative  Ears/Nose/Throat: negative  Respiratory: No shortness of breath, dyspnea on exertion, cough, or hemoptysis  Cardiovascular: negative  Gastrointestinal: negative  Genitourinary: negative  Musculoskeletal: negative  Neurologic: negative  Psychiatric: negative  Hematologic/Lymphatic/Immunologic: negative  Endocrine: negative      O:   NAD, WDWN, Alert & Oriented, Mood & Affect wnl, Vitals stable   Here today alone   BP (!) 166/95  Pulse 97  SpO2 94%   General appearance margie  ii   Vitals stable   Alert, oriented and in no acute distress      Following lymph nodes palpated: Occipital, Cervical, Supraclavicular no lad     Faint red papules on dorsal hands, arms, clear, trunk with rare papules        The remainder of expanded problem focused exam was unremarkable; the following areas were examined:  scalp/hair, conjunctiva/lids, face, neck, lips, back, ab, , chest, digits/nails, RUE, LUE.      Eyes: Conjunctivae/lids:Normal     ENT: Lips, buccal mucosa, tongue: normal    MSK:Normal    Cardiovascular: peripheral edema none    Pulm: Breathing Normal    Lymph Nodes: No Head and Neck Lymphadenopathy     Neuro/Psych: Orientation:Normal; Mood/Affect:Normal      A/P:  1. Favor atopic derm  STOP dial soap   Emollient use discussed with patient   Zyrtec daily  Switch from goldbonds to more oil based emollient   I dont think he has psoriasis  Skin care regimen reviewed with patient: Eliminate harsh soaps, i.e. Dial, zest, irsih spring; Mild soaps such as Cetaphil or Dove sensitive skin, avoid hot or cold showers, aggressive use of emollients including vanicream, cetaphil or cerave discussed with patient.    Risks of non-melanoma skin cancer discussed with patient   Return to clinic prn

## 2017-03-02 NOTE — PROGRESS NOTES
SUBJECTIVE:                                                    Sterling Canales is a 52 year old male who presents to clinic today for the following health issues:    Musculoskeletal problem/pain      Duration: 2 weeks    Description  Location: both legs and feet R>L    Intensity:  moderate    Accompanying signs and symptoms: swelling, skin feels tight     History  Previous similar problem: no   Previous evaluation:  none    Precipitating or alleviating factors:  Trauma or overuse: no   Aggravating factors include: none    Therapies tried and outcome: elevation      -Patient presents with a 2 week hx of ankle swelling  -He first noted this on vacation  -not necessarily on his feet a lot more than normal  -did eat out a lot while on the trip, this was not similar to normal at home diets   -denies any chest pain, no shortness of breath   -elevating legs has helped some    Problem list and histories reviewed & adjusted, as indicated.  Additional history: as documented    Patient Active Problem List   Diagnosis     Mild persistent asthma     Allergic rhinitis     Carpal tunnel syndrome     Malaise and fatigue     MS (multiple sclerosis) (H)     Headache     CARDIOVASCULAR SCREENING; LDL GOAL LESS THAN 160     Hypertension goal BP (blood pressure) < 140/90     Past Surgical History   Procedure Laterality Date     Surgical history of -        lasik bilat     Carpal tunnel release rt/lt  6/08     right     Hernia repair       bilateral inguinal; 2 separate occasions.       Social History   Substance Use Topics     Smoking status: Never Smoker     Smokeless tobacco: Never Used     Alcohol use 0.0 - 1.0 oz/week     0 - 2 Standard drinks or equivalent per week     Family History   Problem Relation Age of Onset     Hypertension Father      DIABETES Father      type 2     Lipids Mother      Breast Cancer Mother 67     Prostate Cancer Paternal Grandfather      dx'd late 70s     CANCER Brother 35     testicular CA      "      Reviewed and updated as needed this visit by clinical staff  Tobacco  Allergies  Med Hx  Surg Hx  Fam Hx  Soc Hx      Reviewed and updated as needed this visit by Provider         ROS:  Constitutional, HEENT, cardiovascular, pulmonary, gi and gu systems are negative, except as otherwise noted.    OBJECTIVE:                                                    /76  Pulse 90  Temp 97.3  F (36.3  C) (Oral)  Ht 5' 8.75\" (1.746 m)  Wt 190 lb (86.2 kg)  SpO2 97%  BMI 28.26 kg/m2  Body mass index is 28.26 kg/(m^2).  GENERAL: healthy, alert and no distress  EYES: Eyes grossly normal to inspection, PERRL and conjunctivae and sclerae normal  RESP: lungs clear to auscultation - no rales, rhonchi or wheezes  CV: regular rate and rhythm, normal S1 S2, no S3 or S4, no murmur, click or rub, no peripheral edema and peripheral pulses strong  MS: 1+ pitting edema noted over distal extremities and bilateral ankles. DP are intact. No evidence for thrombus.     Diagnostic Test Results:  none      ASSESSMENT/PLAN:                                                    1. Bilateral edema of lower extremity  2. Hypertension goal BP (blood pressure) < 140/90  Multifactorial. Heart and lungs wnl. Surprisingly BP was down today after being quite elevated earlier at dermatology. Switching to hctz regimen. Will plan for follow up labs in 3 months or before any refills. He will follow up for repeat BP checks at our pharmacy.   - hydrochlorothiazide (HYDRODIURIL) 25 MG tablet; Take 0.5 tablets (12.5 mg) by mouth daily  Dispense: 30 tablet; Refill: 2    3. Mild persistent asthma  Controlled. Continue present management.     4. MS (multiple sclerosis) (H)  Continues with Dr. Silva, [facility]. On Mark Arroyo PA-C  [facility]    "

## 2017-03-02 NOTE — MR AVS SNAPSHOT
After Visit Summary   3/2/2017    Sterling Canales    MRN: [id number]           Patient Information     Date Of Birth          1965        Visit Information        Provider Department      3/2/2017 2:15 PM Johnson Quiles MD [facility]        Today's Diagnoses     Atopic neurodermatitis    -  1      Care Instructions    Proper skin care from Dr. Quiles- [facility]     Eliminate harsh soaps, i.e. Dial, Zest, [place];   Use mild soaps such as Cetaphil or Dove Sensitive Skin   Avoid hot or cold showers   After showering, lightly dry off.    Aggressive use of a moisturizer (including Vanicream, Cetaphil, Aquafor or Cerave)   We recommend using a tub that needs to be scooped out, not a pump. This has more of an oil base. It will hold moisture in your skin much better than a water base moisturizer. The ones recommended are non- pore clogging.       If you have any questions call [phone] and follow the prompts to Dr. Quiles's office.         Follow-ups after your visit        Your next 10 appointments already scheduled     Mar 02, 2017  2:15 PM CST   New Visit with Johnson Quiles MD   [facility] ([facility]    [address] [place] [id number] [phone]            Mar 02, 2017  3:20 PM CST   SHORT with Donnell Arroyo PA-C   [facility] ([facility]    [address] [facility] [address] [phone]              Who to contact     If you have questions or need follow up information about today's clinic visit or your schedule please contact [facility] directly at [phone].  Normal or non-critical lab and imaging results will be communicated to you by MyChart, letter or phone within 4 business days after the clinic has received the results. If you do not hear from us within 7 days, please contact  the clinic through [employer] or phone. If you have a critical or abnormal lab result, we will notify you by phone as soon as possible.  Submit refill requests through [employer] or call your pharmacy and they will forward the refill request to us. Please allow 3 business days for your refill to be completed.          Additional Information About Your Visit        [employer] Information     [employer] gives you secure access to your electronic health record. If you see a primary care provider, you can also send messages to your care team and make appointments. If you have questions, please call your primary care clinic.  If you do not have a primary care provider, please call [phone] and they will assist you.        Care EveryWhere ID     This is your Care EveryWhere ID. This could be used by other organizations to access your [place] medical records  [id number]        Your Vitals Were     Pulse Pulse Oximetry                97 94%           Blood Pressure from Last 3 Encounters:   03/02/17 (!) 166/95   12/20/16 136/82   11/07/16 128/80    Weight from Last 3 Encounters:   12/20/16 86 kg (189 lb 8 oz)   11/07/16 84 kg (185 lb 3.2 oz)   12/15/15 83.3 kg (183 lb 11.2 oz)              Today, you had the following     No orders found for display       Primary Care Provider Office Phone # Fax #    Donnell Arroyo PA-C [phone]       [facility] [address] [address] [address]        Thank you!     Thank you for choosing [facility]  for your care. Our goal is always to provide you with excellent care. Hearing back from our patients is one way we can continue to improve our services. Please take a few minutes to complete the written survey that you may receive in the mail after your visit with us. Thank you!             Your Updated Medication List - Protect others around you: Learn how to safely use, store and throw away your medicines at  www.disposemymeds.org.          This list is accurate as of: 3/2/17  2:06 PM.  Always use your most recent med list.                   Brand Name Dispense Instructions for use    albuterol 108 (90 BASE) MCG/ACT Inhaler    PROVENTIL HFA    3 Inhaler    Inhale 1-2 puffs into the lungs every 6 hours as needed for shortness of breath / dyspnea       amLODIPine 5 MG tablet    NORVASC    90 tablet    Take 1 tablet (5 mg) by mouth daily       Ascorbic Acid 1000 MG Tbcr      Take  by mouth.       AUBAGIO 14 MG tablet   Generic drug:  teriflunomide      Take 14 mg by mouth daily Only available through select specialty pharmacies       betamethasone dipropionate 0.05 % cream    DIPROSONE    45 g    APPLY TO AFFECTED AREAS ON ARMS, LEGS OR TRUNK TWICE DAILY FOR 10-14 CONSECUTIVE DAYS. NOT TO BE USED ON FACE OR GROIN.       cholecalciferol 1000 UNIT tablet    vitamin D    100 tablet    Take 2 tablets (2,000 Units) by mouth daily       montelukast 10 MG tablet    SINGULAIR    90 tablet    Take 1 tablet (10 mg) by mouth At Bedtime       ONE-A-DAY ESSENTIAL PO      Take  by mouth.       verapamil HCl  MG Cp24     30 capsule    Take 360 mg by mouth daily

## 2017-03-27 DIAGNOSIS — I10 ESSENTIAL HYPERTENSION: Primary | ICD-10-CM

## 2017-03-27 NOTE — TELEPHONE ENCOUNTER
Patient's wife calling that patient had been directed to increase from a half tab of HCTZ to a full tab and has been doing this for 2 weeks. He is now out and the pharmacy will not refill as that is not how the directions are written. Please rewrite. This is not mentioned in the office visit note.

## 2017-03-28 RX ORDER — HYDROCHLOROTHIAZIDE 25 MG/1
25 TABLET ORAL DAILY
Qty: 90 TABLET | Refills: 0 | Status: SHIPPED | OUTPATIENT
Start: 2017-03-28 | End: 2017-06-30

## 2017-03-28 NOTE — TELEPHONE ENCOUNTER
Appears that the prescription wasn't sent.  Can you send it again please?    Teresa CISSE    [facility] [place]

## 2017-04-05 ENCOUNTER — ALLIED HEALTH/NURSE VISIT (OUTPATIENT)
Dept: FAMILY MEDICINE | Facility: CLINIC | Age: 52
End: 2017-04-05
Payer: COMMERCIAL

## 2017-04-05 VITALS — DIASTOLIC BLOOD PRESSURE: 81 MMHG | SYSTOLIC BLOOD PRESSURE: 129 MMHG

## 2017-04-05 DIAGNOSIS — L30.9 ECZEMA, UNSPECIFIED TYPE: ICD-10-CM

## 2017-04-05 DIAGNOSIS — I10 HYPERTENSION GOAL BP (BLOOD PRESSURE) < 140/90: Primary | ICD-10-CM

## 2017-04-05 PROCEDURE — 99207 ZZC NO CHARGE NURSE ONLY: CPT | Performed by: PHYSICIAN ASSISTANT

## 2017-04-05 NOTE — PROGRESS NOTES
Sterling Canales is enrolled/participating in the retail pharmacy Blood Pressure Goals Achievement Program (BPGAP).  Sterling Canales was evaluated at [facility] on April 5, 2017 at which time his blood pressure was:    BP Readings from Last 3 Encounters:   04/05/17 129/81   03/02/17 138/76   03/02/17 (!) 166/95     Reviewed lifestyle modifications for blood pressure control and reduction: including making healthy food choices, managing weight, getting regular exercise, smoking cessation, reducing alcohol consumption, monitoring blood pressure regularly.     Sterling Canales is not experiencing symptoms.    Follow-Up: BP is at goal of < 140/90mmHg (patient 18+ years of age with or without diabetes).  Recommended follow-up at pharmacy in 6 months.     Completed by: Su Chahal, ArturoD  [facility] Pharmacist

## 2017-04-05 NOTE — MR AVS SNAPSHOT
After Visit Summary   4/5/2017    Sterling Canales    MRN: [id number]           Patient Information     Date Of Birth          1965        Visit Information        Provider Department      4/5/2017 5:07 PM Donnell Arroyo PA-C [facility]        Today's Diagnoses     Hypertension goal BP (blood pressure) < 140/90    -  1       Follow-ups after your visit        Who to contact     If you have questions or need follow up information about today's clinic visit or your schedule please contact [facility] directly at [phone].  Normal or non-critical lab and imaging results will be communicated to you by "[employer]hart, letter or phone within 4 business days after the clinic has received the results. If you do not hear from us within 7 days, please contact the clinic through "[employer]hart or phone. If you have a critical or abnormal lab result, we will notify you by phone as soon as possible.  Submit refill requests through [employer] or call your pharmacy and they will forward the refill request to us. Please allow 3 business days for your refill to be completed.          Additional Information About Your Visit        MyChart Information     [employer] gives you secure access to your electronic health record. If you see a primary care provider, you can also send messages to your care team and make appointments. If you have questions, please call your primary care clinic.  If you do not have a primary care provider, please call [phone] and they will assist you.        Care EveryWhere ID     This is your Care EveryWhere ID. This could be used by other organizations to access your [place] medical records  [id number]         Blood Pressure from Last 3 Encounters:   04/05/17 129/81   03/02/17 138/76   03/02/17 (!) 166/95    Weight from Last 3 Encounters:   03/02/17 190 lb (86.2 kg)   12/20/16 189 lb 8 oz (86 kg)   11/07/16 185 lb 3.2 oz (84 kg)              Today, you had the  following     No orders found for display       Primary Care Provider Office Phone # Fax #    Donnell Madhu Arroyo PA-C [phone]       [facility] [address] [address]  [facility] [address]        Thank you!     Thank you for choosing [facility]  for your care. Our goal is always to provide you with excellent care. Hearing back from our patients is one way we can continue to improve our services. Please take a few minutes to complete the written survey that you may receive in the mail after your visit with us. Thank you!             Your Updated Medication List - Protect others around you: Learn how to safely use, store and throw away your medicines at www.disposemymeds.org.          This list is accurate as of: 4/5/17  5:11 PM.  Always use your most recent med list.                   Brand Name Dispense Instructions for use    albuterol 108 (90 BASE) MCG/ACT Inhaler    PROVENTIL HFA    3 Inhaler    Inhale 1-2 puffs into the lungs every 6 hours as needed for shortness of breath / dyspnea       Ascorbic Acid 1000 MG Tbcr      Take  by mouth.       AUBAGIO 14 MG tablet   Generic drug:  teriflunomide      Take 14 mg by mouth daily Only available through select specialty pharmacies       betamethasone dipropionate 0.05 % cream    DIPROSONE    45 g    APPLY TO AFFECTED AREAS ON ARMS, LEGS OR TRUNK TWICE DAILY FOR 10-14 CONSECUTIVE DAYS. NOT TO BE USED ON FACE OR GROIN.       cholecalciferol 1000 UNIT tablet    vitamin D    100 tablet    Take 2 tablets (2,000 Units) by mouth daily       * hydrochlorothiazide 25 MG tablet    HYDRODIURIL    30 tablet    Take 0.5 tablets (12.5 mg) by mouth daily       * hydrochlorothiazide 25 MG tablet    HYDRODIURIL    90 tablet    Take 1 tablet (25 mg) by mouth daily       montelukast 10 MG tablet    SINGULAIR    90 tablet    Take 1 tablet (10 mg) by mouth At Bedtime       ONE-A-DAY ESSENTIAL PO      Take  by mouth.       verapamil HCl  MG  Cp24     30 capsule    Take 360 mg by mouth daily       * Notice:  This list has 2 medication(s) that are the same as other medications prescribed for you. Read the directions carefully, and ask your doctor or other care provider to review them with you.

## 2017-04-10 RX ORDER — BETAMETHASONE DIPROPIONATE 0.5 MG/G
CREAM TOPICAL
Qty: 45 G | Refills: 0 | Status: SHIPPED | OUTPATIENT
Start: 2017-04-10 | End: 2017-07-17

## 2017-06-11 DIAGNOSIS — J45.30 MILD PERSISTENT ASTHMA: ICD-10-CM

## 2017-06-12 NOTE — TELEPHONE ENCOUNTER
montelukast (SINGULAIR) 10 MG      Last Written Prescription Date: 12/13/16  Last Fill Quantity: 90,  # refills: 1   Last Office Visit with [facility], [facility] or [facility] prescribing provider: 3/2/17

## 2017-06-14 RX ORDER — MONTELUKAST SODIUM 10 MG/1
TABLET ORAL
Qty: 90 TABLET | Refills: 1 | Status: SHIPPED | OUTPATIENT
Start: 2017-06-14 | End: 2017-12-21

## 2017-06-14 NOTE — TELEPHONE ENCOUNTER
Prescription approved per [facility] Refill Protocol.  Cheryl Barrios, RN  Triage Nurse

## 2017-06-30 DIAGNOSIS — I10 ESSENTIAL HYPERTENSION: ICD-10-CM

## 2017-07-03 NOTE — TELEPHONE ENCOUNTER
hctz     Last Written Prescription Date: 3/28/17  Last Fill Quantity: 90, # refills: 0  [facility] Visit with [facility] or [facility] prescribing provider: 3/2/17       Potassium   Date Value Ref Range Status   01/17/2017 3.9 3.4 - 5.3 mmol/L Final     Creatinine   Date Value Ref Range Status   01/17/2017 0.97 0.66 - 1.25 mg/dL Final     BP Readings from Last 3 Encounters:   04/05/17 129/81   03/02/17 138/76   03/02/17 (!) 166/95     Routing refill request to provider for review/approval because:  Per 3/2/17 ov, looks like pt to have f/u labs - please order and we can call her to come back for lab work.  Thanks!

## 2017-07-06 RX ORDER — HYDROCHLOROTHIAZIDE 25 MG/1
TABLET ORAL
Qty: 90 TABLET | Refills: 0 | Status: SHIPPED | OUTPATIENT
Start: 2017-07-06 | End: 2017-07-17

## 2017-07-06 NOTE — TELEPHONE ENCOUNTER
Patient needs updated lab work after starting diuretic medication for blood pressure. Please have him come to clinic for test. 90 days of Rx sent in

## 2017-07-17 ENCOUNTER — OFFICE VISIT (OUTPATIENT)
Dept: FAMILY MEDICINE | Facility: CLINIC | Age: 52
End: 2017-07-17
Payer: COMMERCIAL

## 2017-07-17 VITALS
BODY MASS INDEX: 27.42 KG/M2 | OXYGEN SATURATION: 97 % | DIASTOLIC BLOOD PRESSURE: 86 MMHG | WEIGHT: 185.1 LBS | HEIGHT: 69 IN | SYSTOLIC BLOOD PRESSURE: 128 MMHG | HEART RATE: 90 BPM | TEMPERATURE: 98.3 F

## 2017-07-17 DIAGNOSIS — G35 MS (MULTIPLE SCLEROSIS) (H): ICD-10-CM

## 2017-07-17 DIAGNOSIS — I10 HYPERTENSION GOAL BP (BLOOD PRESSURE) < 140/90: Primary | ICD-10-CM

## 2017-07-17 LAB
BASOPHILS # BLD AUTO: 0 10E9/L (ref 0–0.2)
BASOPHILS NFR BLD AUTO: 0.2 %
DIFFERENTIAL METHOD BLD: NORMAL
EOSINOPHIL # BLD AUTO: 0.6 10E9/L (ref 0–0.7)
EOSINOPHIL NFR BLD AUTO: 6.9 %
ERYTHROCYTE [DISTWIDTH] IN BLOOD BY AUTOMATED COUNT: 12.9 % (ref 10–15)
HCT VFR BLD AUTO: 43.6 % (ref 40–53)
HGB BLD-MCNC: 14.7 G/DL (ref 13.3–17.7)
LYMPHOCYTES # BLD AUTO: 2.2 10E9/L (ref 0.8–5.3)
LYMPHOCYTES NFR BLD AUTO: 26.7 %
MCH RBC QN AUTO: 28.9 PG (ref 26.5–33)
MCHC RBC AUTO-ENTMCNC: 33.7 G/DL (ref 31.5–36.5)
MCV RBC AUTO: 86 FL (ref 78–100)
MONOCYTES # BLD AUTO: 0.9 10E9/L (ref 0–1.3)
MONOCYTES NFR BLD AUTO: 10.9 %
NEUTROPHILS # BLD AUTO: 4.6 10E9/L (ref 1.6–8.3)
NEUTROPHILS NFR BLD AUTO: 55.3 %
PLATELET # BLD AUTO: 238 10E9/L (ref 150–450)
RBC # BLD AUTO: 5.09 10E12/L (ref 4.4–5.9)
WBC # BLD AUTO: 8.4 10E9/L (ref 4–11)

## 2017-07-17 PROCEDURE — 85025 COMPLETE CBC W/AUTO DIFF WBC: CPT | Performed by: PHYSICIAN ASSISTANT

## 2017-07-17 PROCEDURE — 36415 COLL VENOUS BLD VENIPUNCTURE: CPT | Performed by: PHYSICIAN ASSISTANT

## 2017-07-17 PROCEDURE — 80053 COMPREHEN METABOLIC PANEL: CPT | Performed by: PHYSICIAN ASSISTANT

## 2017-07-17 PROCEDURE — 82306 VITAMIN D 25 HYDROXY: CPT | Performed by: PHYSICIAN ASSISTANT

## 2017-07-17 PROCEDURE — 99213 OFFICE O/P EST LOW 20 MIN: CPT | Performed by: PHYSICIAN ASSISTANT

## 2017-07-17 RX ORDER — HYDROCHLOROTHIAZIDE 25 MG/1
25 TABLET ORAL DAILY
Qty: 90 TABLET | Refills: 1 | Status: SHIPPED | OUTPATIENT
Start: 2017-07-17 | End: 2018-12-20

## 2017-07-17 NOTE — NURSING NOTE
"Chief Complaint   Patient presents with     Recheck Medication       Initial /86 (BP Location: Right arm, Cuff Size: Adult Large)  Pulse 90  Temp 98.3  F (36.8  C) (Oral)  Ht 5' 8.75\" (1.746 m)  Wt 185 lb 1.6 oz (84 kg)  SpO2 97%  BMI 27.53 kg/m2 Estimated body mass index is 27.53 kg/(m^2) as calculated from the following:    Height as of this encounter: 5' 8.75\" (1.746 m).    Weight as of this encounter: 185 lb 1.6 oz (84 kg).  Medication Reconciliation: complete   Latrell Marina CMA        "

## 2017-07-17 NOTE — PROGRESS NOTES
SUBJECTIVE:                                                    Sterling Canales is a 52 year old male who presents to clinic today for the following health issues:    Medication Followup of Hydrochlorothiazide     Taking Medication as prescribed: yes    Side Effects:  None    Medication Helping Symptoms:  yes     -Patient presents to follow up for HTN  -Started on HCTZ in March, tolerating well  -no further edema  -denies any side effects - urination is normal  -denies chest pain or shortness of breath   -no vision changes    Problem list and histories reviewed & adjusted, as indicated.  Additional history: as documented    Patient Active Problem List   Diagnosis     Mild persistent asthma     Allergic rhinitis     Carpal tunnel syndrome     Malaise and fatigue     MS (multiple sclerosis) (H)     Headache     CARDIOVASCULAR SCREENING; LDL GOAL LESS THAN 160     Hypertension goal BP (blood pressure) < 140/90     Past Surgical History:   Procedure Laterality Date     CARPAL TUNNEL RELEASE RT/LT  6/08    right     HERNIA REPAIR      bilateral inguinal; 2 separate occasions.     SURGICAL HISTORY OF -       lasik bilat       Social History   Substance Use Topics     Smoking status: Never Smoker     Smokeless tobacco: Never Used     Alcohol use 0.0 - 1.0 oz/week     0 - 2 Standard drinks or equivalent per week     Family History   Problem Relation Age of Onset     Hypertension Father      DIABETES Father      type 2     Lipids Mother      Breast Cancer Mother 67     Prostate Cancer Paternal Grandfather      dx'd late 70s     CANCER Brother 35     testicular CA           Reviewed and updated as needed this visit by clinical staff       Reviewed and updated as needed this visit by Provider         ROS:  Constitutional, HEENT, cardiovascular, pulmonary, gi and gu systems are negative, except as otherwise noted.    OBJECTIVE:     /86 (BP Location: Right arm, Cuff Size: Adult Large)  Pulse 90  Temp 98.3  F (36.8  C)  "(Oral)  Ht 5' 8.75\" (1.746 m)  Wt 185 lb 1.6 oz (84 kg)  SpO2 97%  BMI 27.53 kg/m2  Body mass index is 27.53 kg/(m^2).  GENERAL: healthy, alert and no distress  EYES: Eyes grossly normal to inspection, PERRL and conjunctivae and sclerae normal  RESP: lungs clear to auscultation - no rales, rhonchi or wheezes  CV: regular rates and rhythm, normal S1 S2, no S3 or S4 and no murmur, click or rub  MS: no peripheral edema    Diagnostic Test Results:  See A/p    ASSESSMENT/PLAN:   1. Hypertension goal BP (blood pressure) < 140/90  Controlled today on HCTZ (and on verapamil for headaches). Denies side effects. Updating labs today. Continue to check at home and follow up if getting any persistent abnormal results.  - Comprehensive metabolic panel  - hydrochlorothiazide (HYDRODIURIL) 25 MG tablet; Take 1 tablet (25 mg) by mouth daily  Dispense: 90 tablet; Refill: 1    2. MS (multiple sclerosis) (H)  Updating a few labs. Sees neurology later this fall. Continues on aubagio  - Vitamin D Deficiency  - CBC with platelets differential    Donnell Arroyo PA-C  [facility] [place]    "

## 2017-07-17 NOTE — MR AVS SNAPSHOT
"              After Visit Summary   7/17/2017    Sterling Canales    MRN: [id number]           Patient Information     Date Of Birth          1965        Visit Information        Provider Department      7/17/2017 4:20 PM Donnell Arroyo PA-C [facility]        Today's Diagnoses     Hypertension goal BP (blood pressure) < 140/90    -  1    MS (multiple sclerosis) (H)        Essential hypertension           Follow-ups after your visit        Who to contact     If you have questions or need follow up information about today's clinic visit or your schedule please contact [facility] directly at [phone].  Normal or non-critical lab and imaging results will be communicated to you by [employer] Box[employer], letter or phone within 4 business days after the clinic has received the results. If you do not hear from us within 7 days, please contact the clinic through [employer] Box[employer] or phone. If you have a critical or abnormal lab result, we will notify you by phone as soon as possible.  Submit refill requests through W[employer] or call your pharmacy and they will forward the refill request to us. Please allow 3 business days for your refill to be completed.          Additional Information About Your Visit        MyChart Information     Wagon gives you secure access to your electronic health record. If you see a primary care provider, you can also send messages to your care team and make appointments. If you have questions, please call your primary care clinic.  If you do not have a primary care provider, please call [phone] and they will assist you.        Care EveryWhere ID     This is your Care EveryWhere ID. This could be used by other organizations to access your [place] medical records  [id number]        Your Vitals Were     Pulse Temperature Height Pulse Oximetry BMI (Body Mass Index)       90 98.3  F (36.8  C) (Oral) 5' 8.75\" (1.746 m) 97% 27.53 kg/m2        Blood Pressure from Last 3 " Encounters:   07/17/17 128/86   04/05/17 129/81   03/02/17 138/76    Weight from Last 3 Encounters:   07/17/17 185 lb 1.6 oz (84 kg)   03/02/17 190 lb (86.2 kg)   12/20/16 189 lb 8 oz (86 kg)              We Performed the Following     CBC with platelets differential     Comprehensive metabolic panel     Vitamin D Deficiency          Today's Medication Changes          These changes are accurate as of: 7/17/17  4:43 PM.  If you have any questions, ask your nurse or doctor.               These medicines have changed or have updated prescriptions.        Dose/Directions    hydrochlorothiazide 25 MG tablet   Commonly known as:  HYDRODIURIL   This may have changed:  See the new instructions.   Used for:  Essential hypertension   Changed by:  Donnell Arroyo PA-C        Dose:  25 mg   Take 1 tablet (25 mg) by mouth daily   Quantity:  90 tablet   Refills:  1            Where to get your medicines      These medications were sent to [facility] [address] [place] [address] [id number] [employer] AT [place] [address] & [facility]  [address] [facility] [address]     Phone:  [phone]     hydrochlorothiazide 25 MG tablet                Primary Care Provider Office Phone # Fax #    Donnell Arroyo PA-C [phone]       [facility] [address] [facility] [address]        Equal Access to Services     BRINA GILMAN AH: Hadii aad ku hadasho Soomaali, waaxda luqadaha, qaybta kaalmada adeegyada, reina blank haysandie pires. So [facility] [phone].    ATENCIÓN: Si habla español, tiene a edmondson disposición servicios gratuitos de asistencia lingüística. Karely al [phone].    We comply with applicable federal civil rights laws and [place] laws. We do not discriminate on the basis of race, color, national origin, age, disability sex, sexual orientation or gender identity.            Thank you!     Thank you for choosing [facility]  for your  care. Our goal is always to provide you with excellent care. Hearing back from our patients is one way we can continue to improve our services. Please take a few minutes to complete the written survey that you may receive in the mail after your visit with us. Thank you!             Your Updated Medication List - Protect others around you: Learn how to safely use, store and throw away your medicines at www.disposemymeds.org.          This list is accurate as of: 7/17/17  4:43 PM.  Always use your most recent med list.                   Brand Name Dispense Instructions for use Diagnosis    albuterol 108 (90 BASE) MCG/ACT Inhaler    PROVENTIL HFA    3 Inhaler    Inhale 1-2 puffs into the lungs every 6 hours as needed for shortness of breath / dyspnea    Mild persistent asthma       Ascorbic Acid 1000 MG Tbcr      Take  by mouth.        AUBAGIO 14 MG tablet   Generic drug:  teriflunomide      Take 14 mg by mouth daily Only available through select specialty pharmacies        betamethasone dipropionate 0.05 % cream    DIPROSONE    45 g    APPLY TO AFFECTED AREAS ON ARMS, LEGS OR TRUNK TWICE DAILY FOR 10-14 CONSECUTIVE DAYS. NOT TO BE USED ON FACE OR GROIN.    Eczema, unspecified type       cholecalciferol 1000 UNIT tablet    vitamin D    100 tablet    Take 2 tablets (2,000 Units) by mouth daily        hydrochlorothiazide 25 MG tablet    HYDRODIURIL    90 tablet    Take 1 tablet (25 mg) by mouth daily    Essential hypertension       montelukast 10 MG tablet    SINGULAIR    90 tablet    TAKE ONE TABLET BY MOUTH AT BEDTIME    Mild persistent asthma       ONE-A-DAY ESSENTIAL PO      Take  by mouth.        verapamil HCl  MG Cp24     30 capsule    Take 360 mg by mouth daily    Hypertension goal BP (blood pressure) < 140/90

## 2017-07-18 LAB
ALBUMIN SERPL-MCNC: 4.3 G/DL (ref 3.4–5)
ALP SERPL-CCNC: 65 U/L (ref 40–150)
ALT SERPL W P-5'-P-CCNC: 33 U/L (ref 0–70)
ANION GAP SERPL CALCULATED.3IONS-SCNC: 11 MMOL/L (ref 3–14)
AST SERPL W P-5'-P-CCNC: 21 U/L (ref 0–45)
BILIRUB SERPL-MCNC: 0.5 MG/DL (ref 0.2–1.3)
BUN SERPL-MCNC: 17 MG/DL (ref 7–30)
CALCIUM SERPL-MCNC: 9.5 MG/DL (ref 8.5–10.1)
CHLORIDE SERPL-SCNC: 101 MMOL/L (ref 94–109)
CO2 SERPL-SCNC: 27 MMOL/L (ref 20–32)
CREAT SERPL-MCNC: 1 MG/DL (ref 0.66–1.25)
GFR SERPL CREATININE-BSD FRML MDRD: 79 ML/MIN/1.7M2
GLUCOSE SERPL-MCNC: 105 MG/DL (ref 70–99)
POTASSIUM SERPL-SCNC: 3.4 MMOL/L (ref 3.4–5.3)
PROT SERPL-MCNC: 7.6 G/DL (ref 6.8–8.8)
SODIUM SERPL-SCNC: 139 MMOL/L (ref 133–144)

## 2017-07-19 LAB — DEPRECATED CALCIDIOL+CALCIFEROL SERPL-MC: 60 UG/L (ref 20–75)

## 2017-07-19 ASSESSMENT — ASTHMA QUESTIONNAIRES: ACT_TOTALSCORE: 25

## 2017-08-21 ENCOUNTER — TRANSFERRED RECORDS (OUTPATIENT)
Dept: HEALTH INFORMATION MANAGEMENT | Facility: CLINIC | Age: 52
End: 2017-08-21

## 2017-08-28 ENCOUNTER — TELEPHONE (OUTPATIENT)
Dept: FAMILY MEDICINE | Facility: CLINIC | Age: 52
End: 2017-08-28

## 2017-08-28 ENCOUNTER — OFFICE VISIT (OUTPATIENT)
Dept: FAMILY MEDICINE | Facility: CLINIC | Age: 52
End: 2017-08-28
Payer: COMMERCIAL

## 2017-08-28 VITALS
WEIGHT: 177.9 LBS | TEMPERATURE: 98.1 F | BODY MASS INDEX: 26.35 KG/M2 | SYSTOLIC BLOOD PRESSURE: 128 MMHG | OXYGEN SATURATION: 97 % | HEIGHT: 69 IN | HEART RATE: 93 BPM | DIASTOLIC BLOOD PRESSURE: 86 MMHG

## 2017-08-28 DIAGNOSIS — G35 MS (MULTIPLE SCLEROSIS) (H): ICD-10-CM

## 2017-08-28 DIAGNOSIS — R50.9 FEVER, UNSPECIFIED: Primary | ICD-10-CM

## 2017-08-28 DIAGNOSIS — R52 BODY ACHES: ICD-10-CM

## 2017-08-28 DIAGNOSIS — R51.9 ACUTE NONINTRACTABLE HEADACHE, UNSPECIFIED HEADACHE TYPE: ICD-10-CM

## 2017-08-28 LAB
ALBUMIN UR-MCNC: 30 MG/DL
APPEARANCE UR: CLEAR
BACTERIA #/AREA URNS HPF: ABNORMAL /HPF
BASOPHILS # BLD AUTO: 0 10E9/L (ref 0–0.2)
BASOPHILS NFR BLD AUTO: 0.3 %
BILIRUB UR QL STRIP: NEGATIVE
COLOR UR AUTO: YELLOW
DIFFERENTIAL METHOD BLD: NORMAL
EOSINOPHIL # BLD AUTO: 0 10E9/L (ref 0–0.7)
EOSINOPHIL NFR BLD AUTO: 0.6 %
ERYTHROCYTE [DISTWIDTH] IN BLOOD BY AUTOMATED COUNT: 12.7 % (ref 10–15)
GLUCOSE UR STRIP-MCNC: NEGATIVE MG/DL
HCT VFR BLD AUTO: 46.6 % (ref 40–53)
HGB BLD-MCNC: 15.7 G/DL (ref 13.3–17.7)
HGB UR QL STRIP: NEGATIVE
KETONES UR STRIP-MCNC: NEGATIVE MG/DL
LEUKOCYTE ESTERASE UR QL STRIP: NEGATIVE
LYMPHOCYTES # BLD AUTO: 1 10E9/L (ref 0.8–5.3)
LYMPHOCYTES NFR BLD AUTO: 13.6 %
MCH RBC QN AUTO: 28.5 PG (ref 26.5–33)
MCHC RBC AUTO-ENTMCNC: 33.7 G/DL (ref 31.5–36.5)
MCV RBC AUTO: 85 FL (ref 78–100)
MONOCYTES # BLD AUTO: 0.4 10E9/L (ref 0–1.3)
MONOCYTES NFR BLD AUTO: 5.5 %
MUCOUS THREADS #/AREA URNS LPF: PRESENT /LPF
NEUTROPHILS # BLD AUTO: 5.8 10E9/L (ref 1.6–8.3)
NEUTROPHILS NFR BLD AUTO: 80 %
NITRATE UR QL: NEGATIVE
PH UR STRIP: 7 PH (ref 5–7)
PLATELET # BLD AUTO: 207 10E9/L (ref 150–450)
RBC # BLD AUTO: 5.51 10E12/L (ref 4.4–5.9)
RBC #/AREA URNS AUTO: ABNORMAL /HPF
SOURCE: ABNORMAL
SP GR UR STRIP: 1.02 (ref 1–1.03)
UROBILINOGEN UR STRIP-ACNC: 1 EU/DL (ref 0.2–1)
WBC # BLD AUTO: 7.3 10E9/L (ref 4–11)
WBC #/AREA URNS AUTO: ABNORMAL /HPF

## 2017-08-28 PROCEDURE — 99214 OFFICE O/P EST MOD 30 MIN: CPT | Mod: 25 | Performed by: PHYSICIAN ASSISTANT

## 2017-08-28 PROCEDURE — 85025 COMPLETE CBC W/AUTO DIFF WBC: CPT | Performed by: PHYSICIAN ASSISTANT

## 2017-08-28 PROCEDURE — 80053 COMPREHEN METABOLIC PANEL: CPT | Performed by: PHYSICIAN ASSISTANT

## 2017-08-28 PROCEDURE — 96372 THER/PROPH/DIAG INJ SC/IM: CPT | Performed by: PHYSICIAN ASSISTANT

## 2017-08-28 PROCEDURE — 81001 URINALYSIS AUTO W/SCOPE: CPT | Performed by: PHYSICIAN ASSISTANT

## 2017-08-28 PROCEDURE — 36415 COLL VENOUS BLD VENIPUNCTURE: CPT | Performed by: PHYSICIAN ASSISTANT

## 2017-08-28 RX ORDER — KETOROLAC TROMETHAMINE 30 MG/ML
60 INJECTION, SOLUTION INTRAMUSCULAR; INTRAVENOUS ONCE
Qty: 2 ML | Refills: 0 | OUTPATIENT
Start: 2017-08-28 | End: 2017-08-28

## 2017-08-28 RX ORDER — SUMATRIPTAN 25 MG/1
25-50 TABLET, FILM COATED ORAL
Qty: 9 TABLET | Refills: 1 | Status: SHIPPED | OUTPATIENT
Start: 2017-08-28 | End: 2020-12-31

## 2017-08-28 NOTE — MR AVS SNAPSHOT
"              After Visit Summary   8/28/2017    Sterling Canales    MRN: [id number]           Patient Information     Date Of Birth          1965        Visit Information        Provider Department      8/28/2017 2:40 PM Donnell Arroyo PA-C [facility]        Today's Diagnoses     Fever, unspecified    -  1    Body aches        MS (multiple sclerosis) (H)        Acute nonintractable headache, unspecified headache type           Follow-ups after your visit        Who to contact     If you have questions or need follow up information about today's clinic visit or your schedule please contact [facility] directly at [phone].  Normal or non-critical lab and imaging results will be communicated to you by [employer]art, letter or phone within 4 business days after the clinic has received the results. If you do not hear from us within 7 days, please contact the clinic through [employer]ghart or phone. If you have a critical or abnormal lab result, we will notify you by phone as soon as possible.  Submit refill requests through [employer] or call your pharmacy and they will forward the refill request to us. Please allow 3 business days for your refill to be completed.          Additional Information About Your Visit        MyChart Information     [employer] gives you secure access to your electronic health record. If you see a primary care provider, you can also send messages to your care team and make appointments. If you have questions, please call your primary care clinic.  If you do not have a primary care provider, please call [phone] and they will assist you.        Care EveryWhere ID     This is your Care EveryWhere ID. This could be used by other organizations to access your [place] medical records  [id number]        Your Vitals Were     Pulse Temperature Height Pulse Oximetry BMI (Body Mass Index)       93 98.1  F (36.7  C) (Oral) 5' 8.75\" (1.746 m) 97% 26.46 kg/m2        Blood " Pressure from Last 3 Encounters:   08/28/17 128/86   07/17/17 128/86   04/05/17 129/81    Weight from Last 3 Encounters:   08/28/17 177 lb 14.4 oz (80.7 kg)   07/17/17 185 lb 1.6 oz (84 kg)   03/02/17 190 lb (86.2 kg)              We Performed the Following     *UA reflex to Microscopic and Culture ([place] [facility] (except [place] and [place])     CBC with platelets differential     Comprehensive metabolic panel     Urine Microscopic          Today's Medication Changes          These changes are accurate as of: 8/28/17  4:02 PM.  If you have any questions, ask your nurse or doctor.               Start taking these medicines.        Dose/Directions    ketorolac 60 MG/2ML Soln injection   Commonly known as:  TORADOL   Used for:  Acute nonintractable headache, unspecified headache type   Started by:  Donnell Arroyo PA-C        Dose:  60 mg   Inject 2 mLs (60 mg) into the muscle once for 1 dose   Quantity:  2 mL   Refills:  0            Where to get your medicines      Some of these will need a paper prescription and others can be bought over the counter.  Ask your nurse if you have questions.     You don't need a prescription for these medications     ketorolac 60 MG/2ML Soln injection                Primary Care Provider Office Phone # Fax #    Donnell Arroyo PA-C [phone] [address] [facility] [address]        Equal Access to Services     AUBREY GILMAN AH: Hadii aad ku hadasho Soomaali, waaxda luqadaha, qaybta kaalmada adeegyada, reina blank haysandie darden . So [facility] [phone].    ATENCIÓN: Si habla español, tiene a edmondson disposición servicios gratuitos de asistencia lingüística. Llame al [phone].    We comply with applicable federal civil rights laws and [place] laws. We do not discriminate on the basis of race, color, national origin, age, disability sex, sexual orientation or gender identity.            Thank you!     Thank you for  choosing [facility]  for your care. Our goal is always to provide you with excellent care. Hearing back from our patients is one way we can continue to improve our services. Please take a few minutes to complete the written survey that you may receive in the mail after your visit with us. Thank you!             Your Updated Medication List - Protect others around you: Learn how to safely use, store and throw away your medicines at www.disposemymeds.org.          This list is accurate as of: 8/28/17  4:02 PM.  Always use your most recent med list.                   Brand Name Dispense Instructions for use Diagnosis    albuterol 108 (90 BASE) MCG/ACT Inhaler    PROVENTIL HFA    3 Inhaler    Inhale 1-2 puffs into the lungs every 6 hours as needed for shortness of breath / dyspnea    Mild persistent asthma       Ascorbic Acid 1000 MG Tbcr      Take  by mouth.        AUBAGIO 14 MG tablet   Generic drug:  teriflunomide      Take 14 mg by mouth daily Only available through select specialty pharmacies        betamethasone dipropionate 0.05 % cream    DIPROSONE    45 g    APPLY TO AFFECTED AREAS ON ARMS, LEGS OR TRUNK TWICE DAILY FOR 10-14 CONSECUTIVE DAYS. NOT TO BE USED ON FACE OR GROIN.    Eczema, unspecified type       cholecalciferol 1000 UNIT tablet    vitamin D    100 tablet    Take 2 tablets (2,000 Units) by mouth daily        hydrochlorothiazide 25 MG tablet    HYDRODIURIL    90 tablet    Take 1 tablet (25 mg) by mouth daily    Hypertension goal BP (blood pressure) < 140/90       ketorolac 60 MG/2ML Soln injection    TORADOL    2 mL    Inject 2 mLs (60 mg) into the muscle once for 1 dose    Acute nonintractable headache, unspecified headache type       montelukast 10 MG tablet    SINGULAIR    90 tablet    TAKE ONE TABLET BY MOUTH AT BEDTIME    Mild persistent asthma       ONE-A-DAY ESSENTIAL PO      Take  by mouth.        verapamil HCl  MG Cp24     30 capsule    Take 360 mg by mouth daily     Hypertension goal BP (blood pressure) < 140/90

## 2017-08-28 NOTE — PROGRESS NOTES
"  SUBJECTIVE:   Sterling Canales is a 52 year old male who presents to clinic today for the following health issues:    RESPIRATORY SYMPTOMS      Duration: 4 days ago    Description  facial pain/pressure, fever, chills, headache, fatigue/malaise, hoarse voice, myalgias, nausea and stomach ache    Severity: moderate to severe (HA)    Accompanying signs and symptoms: Dizziness    History (predisposing factors):  asthma    Precipitating or alleviating factors: None    Therapies tried and outcome:  Ibuprofen @ 1:30    -Patient presents with new onset flu-like symptoms since last week  -Started with body ache last Thursday; \"all over\"  -Then over the weekend he developed fevers and chills;   -now with some limited appetite today  -bad headaches, off and on all weekend  -there is no cough, some dry mouth  -denies any allergy symptoms, congestion, pnd   -there is no urination or bowel problems; + queasy stomach  -there is no new rash  -oting increase in MS symptoms, feeling clumsy, light headed  -does note one individual at work was sick aoout one week ago, but he has limited exposure to her  -ibuprofen seems to improve feverish symptoms, less so the headache      Problem list and histories reviewed & adjusted, as indicated.  Additional history: as documented    Patient Active Problem List   Diagnosis     Mild persistent asthma     Allergic rhinitis     Carpal tunnel syndrome     Malaise and fatigue     MS (multiple sclerosis) (H)     Headache     CARDIOVASCULAR SCREENING; LDL GOAL LESS THAN 160     Hypertension goal BP (blood pressure) < 140/90     Past Surgical History:   Procedure Laterality Date     CARPAL TUNNEL RELEASE RT/LT  6/08    right     HERNIA REPAIR      bilateral inguinal; 2 separate occasions.     SURGICAL HISTORY OF -       lasik bilat       Social History   Substance Use Topics     Smoking status: Never Smoker     Smokeless tobacco: Never Used     Alcohol use 0.0 - 1.0 oz/week     0 - 2 Standard drinks or " "equivalent per week     Family History   Problem Relation Age of Onset     Hypertension Father      DIABETES Father      type 2     Lipids Mother      Breast Cancer Mother 67     Prostate Cancer Paternal Grandfather      dx'd late 70s     CANCER Brother 35     testicular CA             Reviewed and updated as needed this visit by clinical staffTobacco  Allergies  Med Hx  Surg Hx  Fam Hx  Soc Hx      Reviewed and updated as needed this visit by Provider         ROS:  Constitutional, HEENT, cardiovascular, pulmonary, gi and gu systems are negative, except as otherwise noted.      OBJECTIVE:   /86 (BP Location: Right arm, Cuff Size: Adult Large)  Pulse 93  Temp 98.1  F (36.7  C) (Oral)  Ht 5' 8.75\" (1.746 m)  Wt 177 lb 14.4 oz (80.7 kg)  SpO2 97%  BMI 26.46 kg/m2  Body mass index is 26.46 kg/(m^2).  GENERAL: healthy, alert and no distress  EYES: Eyes grossly normal to inspection, PERRL and conjunctivae and sclerae normal  HENT: ear canals and TM's normal, nose and mouth without ulcers or lesions  RESP: lungs clear to auscultation - no rales, rhonchi or wheezes  CV: regular rates and rhythm, normal S1 S2, no S3 or S4 and no murmur, click or rub  ABDOMEN: soft, nontender, no hepatosplenomegaly, no masses and bowel sounds normal  MS: no gross musculoskeletal defects noted, no edema  NEURO: mentation intact, speech normal and cranial nerves 2-12 intact    Diagnostic Test Results:  Results for orders placed or performed in visit on 08/28/17 (from the past 24 hour(s))   CBC with platelets differential   Result Value Ref Range    WBC 7.3 4.0 - 11.0 10e9/L    RBC Count 5.51 4.4 - 5.9 10e12/L    Hemoglobin 15.7 13.3 - 17.7 g/dL    Hematocrit 46.6 40.0 - 53.0 %    MCV 85 78 - 100 fl    MCH 28.5 26.5 - 33.0 pg    MCHC 33.7 31.5 - 36.5 g/dL    RDW 12.7 10.0 - 15.0 %    Platelet Count 207 150 - 450 10e9/L    Diff Method Automated Method     % Neutrophils 80.0 %    % Lymphocytes 13.6 %    % Monocytes 5.5 %    % " Eosinophils 0.6 %    % Basophils 0.3 %    Absolute Neutrophil 5.8 1.6 - 8.3 10e9/L    Absolute Lymphocytes 1.0 0.8 - 5.3 10e9/L    Absolute Monocytes 0.4 0.0 - 1.3 10e9/L    Absolute Eosinophils 0.0 0.0 - 0.7 10e9/L    Absolute Basophils 0.0 0.0 - 0.2 10e9/L   *UA reflex to Microscopic and Culture ([facility] (except [place] and [place])   Result Value Ref Range    Color Urine Yellow     Appearance Urine Clear     Glucose Urine Negative NEG^Negative mg/dL    Bilirubin Urine Negative NEG^Negative    Ketones Urine Negative NEG^Negative mg/dL    Specific Gravity Urine 1.025 1.003 - 1.035    Blood Urine Negative NEG^Negative    pH Urine 7.0 5.0 - 7.0 pH    Protein Albumin Urine 30 (A) NEG^Negative mg/dL    Urobilinogen Urine 1.0 0.2 - 1.0 EU/dL    Nitrite Urine Negative NEG^Negative    Leukocyte Esterase Urine Negative NEG^Negative    Source Midstream Urine    Urine Microscopic   Result Value Ref Range    WBC Urine O - 2 OTO2^O - 2 /HPF    RBC Urine O - 2 OTO2^O - 2 /HPF    Bacteria Urine Few (A) NEG^Negative /HPF    Mucous Urine Present (A) NEG^Negative /LPF       ASSESSMENT/PLAN:   1. Fever, unspecified  2. Body aches  3. MS (multiple sclerosis) (H)  4. Acute nonintractable headache, unspecified headache type  New onset symptoms late last week. Fevers likely exacerbating MS symptoms. Vitals, labs and exam today grossly normal without hint towards etiology. He has a note in to his neurologist to determine any further recs from them. Trial of ketorolac to help with migraine, and ok for rescue. He notes his verapamil has been less successful lately and plans to discuss this with neuro. He will continue supportive cares and follow up with worsening or little improvement in the next few days.  - Comprehensive metabolic panel  - CBC with platelets differential  - *UA reflex to Microscopic and Culture ([facility] (except [place] and [place])  - Urine Microscopic  - ketorolac (TORADOL)  60 MG/2ML SOLN injection; Inject 2 mLs (60 mg) into the muscle once for 1 dose  Dispense: 2 mL; Refill: 0  - SUMAtriptan (IMITREX) 25 MG tablet; Take 1-2 tablets (25-50 mg) by mouth at onset of headache for migraine May repeat in 2 hours. Max 8 tablets/24 hours.  Dispense: 9 tablet; Refill: 1  - KETOROLAC TROMETHAMINE 15MG  - INJECTION INTRAMUSCULAR OR SUB-Q    Donnell Arroyo PA-C  [facility]

## 2017-08-28 NOTE — TELEPHONE ENCOUNTER
Wife, Faby, left VM message that patient has had low grade temps since Thursday night.  Last night temp was 101.6.  Patient with history of MS, and has noted more dizziness and clumsiness with the fever.  Appointment scheduled with Kennedy Arroyo this afternoon.  I called Faby (consent to communicate in chart) and DOUGLAS that I felt patient should be seen today and an appointment was scheduled.  I asked her to call the clinic back to confirm that they can make this appointment.  MAU Proctor RN

## 2017-08-28 NOTE — NURSING NOTE
"Chief Complaint   Patient presents with     Headache     Fever       Initial /86 (BP Location: Right arm, Cuff Size: Adult Large)  Pulse 93  Temp 98.1  F (36.7  C) (Oral)  Ht 5' 8.75\" (1.746 m)  Wt 177 lb 14.4 oz (80.7 kg)  SpO2 97%  BMI 26.46 kg/m2 Estimated body mass index is 26.46 kg/(m^2) as calculated from the following:    Height as of this encounter: 5' 8.75\" (1.746 m).    Weight as of this encounter: 177 lb 14.4 oz (80.7 kg).  Medication Reconciliation: complete   Latrell Mraina CMA      "

## 2017-08-29 ENCOUNTER — APPOINTMENT (OUTPATIENT)
Dept: CT IMAGING | Facility: CLINIC | Age: 52
End: 2017-08-29
Attending: EMERGENCY MEDICINE
Payer: COMMERCIAL

## 2017-08-29 ENCOUNTER — HOSPITAL ENCOUNTER (EMERGENCY)
Facility: CLINIC | Age: 52
Discharge: HOME | End: 2017-08-29
Attending: EMERGENCY MEDICINE | Admitting: EMERGENCY MEDICINE
Payer: COMMERCIAL

## 2017-08-29 VITALS
OXYGEN SATURATION: 94 % | TEMPERATURE: 98.9 F | BODY MASS INDEX: 26.03 KG/M2 | RESPIRATION RATE: 18 BRPM | SYSTOLIC BLOOD PRESSURE: 145 MMHG | HEART RATE: 93 BPM | WEIGHT: 175 LBS | DIASTOLIC BLOOD PRESSURE: 100 MMHG

## 2017-08-29 DIAGNOSIS — R50.9 FEVER, UNSPECIFIED: ICD-10-CM

## 2017-08-29 DIAGNOSIS — G44.219 EPISODIC TENSION-TYPE HEADACHE, NOT INTRACTABLE: ICD-10-CM

## 2017-08-29 LAB
ALBUMIN SERPL-MCNC: 3.9 G/DL (ref 3.4–5)
ALBUMIN SERPL-MCNC: 4.3 G/DL (ref 3.4–5)
ALP SERPL-CCNC: 55 U/L (ref 40–150)
ALP SERPL-CCNC: 57 U/L (ref 40–150)
ALT SERPL W P-5'-P-CCNC: 23 U/L (ref 0–70)
ALT SERPL W P-5'-P-CCNC: 28 U/L (ref 0–70)
ANION GAP SERPL CALCULATED.3IONS-SCNC: 10 MMOL/L (ref 3–14)
ANION GAP SERPL CALCULATED.3IONS-SCNC: 8 MMOL/L (ref 3–14)
APPEARANCE CSF: CLEAR
AST SERPL W P-5'-P-CCNC: 19 U/L (ref 0–45)
AST SERPL W P-5'-P-CCNC: 21 U/L (ref 0–45)
BASOPHILS # BLD AUTO: 0 10E9/L (ref 0–0.2)
BASOPHILS NFR BLD AUTO: 0.6 %
BILIRUB SERPL-MCNC: 0.6 MG/DL (ref 0.2–1.3)
BILIRUB SERPL-MCNC: 0.9 MG/DL (ref 0.2–1.3)
BUN SERPL-MCNC: 14 MG/DL (ref 7–30)
BUN SERPL-MCNC: 16 MG/DL (ref 7–30)
CALCIUM SERPL-MCNC: 9.2 MG/DL (ref 8.5–10.1)
CALCIUM SERPL-MCNC: 9.6 MG/DL (ref 8.5–10.1)
CHLORIDE SERPL-SCNC: 100 MMOL/L (ref 94–109)
CHLORIDE SERPL-SCNC: 100 MMOL/L (ref 94–109)
CO2 SERPL-SCNC: 28 MMOL/L (ref 20–32)
CO2 SERPL-SCNC: 29 MMOL/L (ref 20–32)
COLOR CSF: COLORLESS
CREAT SERPL-MCNC: 0.89 MG/DL (ref 0.66–1.25)
CREAT SERPL-MCNC: 0.94 MG/DL (ref 0.66–1.25)
DIFFERENTIAL METHOD BLD: NORMAL
EOSINOPHIL # BLD AUTO: 0.1 10E9/L (ref 0–0.7)
EOSINOPHIL NFR BLD AUTO: 2.3 %
ERYTHROCYTE [DISTWIDTH] IN BLOOD BY AUTOMATED COUNT: 12.3 % (ref 10–15)
GFR SERPL CREATININE-BSD FRML MDRD: 84 ML/MIN/1.7M2
GFR SERPL CREATININE-BSD FRML MDRD: >90 ML/MIN/1.7M2
GLUCOSE CSF-MCNC: 61 MG/DL (ref 40–70)
GLUCOSE SERPL-MCNC: 141 MG/DL (ref 70–99)
GLUCOSE SERPL-MCNC: 99 MG/DL (ref 70–99)
GRAM STN SPEC: NORMAL
HCT VFR BLD AUTO: 43.5 % (ref 40–53)
HETEROPH AB SER QL: NEGATIVE
HGB BLD-MCNC: 14.6 G/DL (ref 13.3–17.7)
IMM GRANULOCYTES # BLD: 0 10E9/L (ref 0–0.4)
IMM GRANULOCYTES NFR BLD: 0.3 %
INR PPP: 0.9 (ref 0.86–1.14)
LYMPHOCYTES # BLD AUTO: 1.5 10E9/L (ref 0.8–5.3)
LYMPHOCYTES NFR BLD AUTO: 23.8 %
Lab: NORMAL
MCH RBC QN AUTO: 28.2 PG (ref 26.5–33)
MCHC RBC AUTO-ENTMCNC: 33.6 G/DL (ref 31.5–36.5)
MCV RBC AUTO: 84 FL (ref 78–100)
MONOCYTES # BLD AUTO: 0.9 10E9/L (ref 0–1.3)
MONOCYTES NFR BLD AUTO: 14.2 %
NEUTROPHILS # BLD AUTO: 3.6 10E9/L (ref 1.6–8.3)
NEUTROPHILS NFR BLD AUTO: 58.8 %
NRBC # BLD AUTO: 0 10*3/UL
NRBC BLD AUTO-RTO: 0 /100
PLATELET # BLD AUTO: 219 10E9/L (ref 150–450)
POTASSIUM SERPL-SCNC: 3.1 MMOL/L (ref 3.4–5.3)
POTASSIUM SERPL-SCNC: 3.3 MMOL/L (ref 3.4–5.3)
PROT CSF-MCNC: 60 MG/DL (ref 15–60)
PROT SERPL-MCNC: 7.4 G/DL (ref 6.8–8.8)
PROT SERPL-MCNC: 7.8 G/DL (ref 6.8–8.8)
RBC # BLD AUTO: 5.18 10E12/L (ref 4.4–5.9)
RBC # CSF MANUAL: 3 /UL (ref 0–2)
RBC # CSF MANUAL: NORMAL /UL (ref 0–2)
SODIUM SERPL-SCNC: 137 MMOL/L (ref 133–144)
SODIUM SERPL-SCNC: 138 MMOL/L (ref 133–144)
SPECIMEN SOURCE: NORMAL
TUBE # CSF: 4 #
WBC # BLD AUTO: 6.2 10E9/L (ref 4–11)
WBC # CSF MANUAL: 4 /UL (ref 0–5)
WBC # CSF MANUAL: NORMAL /UL (ref 0–5)

## 2017-08-29 PROCEDURE — 87070 CULTURE OTHR SPECIMN AEROBIC: CPT | Performed by: EMERGENCY MEDICINE

## 2017-08-29 PROCEDURE — 86308 HETEROPHILE ANTIBODY SCREEN: CPT | Performed by: EMERGENCY MEDICINE

## 2017-08-29 PROCEDURE — 96361 HYDRATE IV INFUSION ADD-ON: CPT

## 2017-08-29 PROCEDURE — 85610 PROTHROMBIN TIME: CPT | Performed by: EMERGENCY MEDICINE

## 2017-08-29 PROCEDURE — 80053 COMPREHEN METABOLIC PANEL: CPT | Performed by: EMERGENCY MEDICINE

## 2017-08-29 PROCEDURE — 25000128 H RX IP 250 OP 636: Performed by: EMERGENCY MEDICINE

## 2017-08-29 PROCEDURE — 87205 SMEAR GRAM STAIN: CPT | Performed by: EMERGENCY MEDICINE

## 2017-08-29 PROCEDURE — 87529 HSV DNA AMP PROBE: CPT | Performed by: EMERGENCY MEDICINE

## 2017-08-29 PROCEDURE — 99285 EMERGENCY DEPT VISIT HI MDM: CPT | Mod: 25

## 2017-08-29 PROCEDURE — 62270 DX LMBR SPI PNXR: CPT

## 2017-08-29 PROCEDURE — 84157 ASSAY OF PROTEIN OTHER: CPT | Performed by: EMERGENCY MEDICINE

## 2017-08-29 PROCEDURE — 87498 ENTEROVIRUS PROBE&REVRS TRNS: CPT | Performed by: EMERGENCY MEDICINE

## 2017-08-29 PROCEDURE — 96375 TX/PRO/DX INJ NEW DRUG ADDON: CPT | Mod: 59

## 2017-08-29 PROCEDURE — 70450 CT HEAD/BRAIN W/O DYE: CPT

## 2017-08-29 PROCEDURE — 82945 GLUCOSE OTHER FLUID: CPT | Performed by: EMERGENCY MEDICINE

## 2017-08-29 PROCEDURE — 89050 BODY FLUID CELL COUNT: CPT | Performed by: EMERGENCY MEDICINE

## 2017-08-29 PROCEDURE — 85025 COMPLETE CBC W/AUTO DIFF WBC: CPT | Performed by: EMERGENCY MEDICINE

## 2017-08-29 PROCEDURE — 96374 THER/PROPH/DIAG INJ IV PUSH: CPT | Mod: 59

## 2017-08-29 RX ORDER — DIPHENHYDRAMINE HYDROCHLORIDE 50 MG/ML
25 INJECTION INTRAMUSCULAR; INTRAVENOUS ONCE
Status: COMPLETED | OUTPATIENT
Start: 2017-08-29 | End: 2017-08-29

## 2017-08-29 RX ORDER — LIDOCAINE HYDROCHLORIDE 10 MG/ML
INJECTION, SOLUTION EPIDURAL; INFILTRATION; INTRACAUDAL; PERINEURAL
Status: DISCONTINUED
Start: 2017-08-29 | End: 2017-08-30 | Stop reason: HOSPADM

## 2017-08-29 RX ORDER — METOCLOPRAMIDE HYDROCHLORIDE 5 MG/ML
10 INJECTION INTRAMUSCULAR; INTRAVENOUS ONCE
Status: COMPLETED | OUTPATIENT
Start: 2017-08-29 | End: 2017-08-29

## 2017-08-29 RX ORDER — KETOROLAC TROMETHAMINE 15 MG/ML
15 INJECTION, SOLUTION INTRAMUSCULAR; INTRAVENOUS ONCE
Status: COMPLETED | OUTPATIENT
Start: 2017-08-29 | End: 2017-08-29

## 2017-08-29 RX ADMIN — SODIUM CHLORIDE 1000 ML: 9 INJECTION, SOLUTION INTRAVENOUS at 16:07

## 2017-08-29 RX ADMIN — METOCLOPRAMIDE 10 MG: 5 INJECTION, SOLUTION INTRAMUSCULAR; INTRAVENOUS at 16:08

## 2017-08-29 RX ADMIN — KETOROLAC TROMETHAMINE 15 MG: 15 INJECTION, SOLUTION INTRAMUSCULAR; INTRAVENOUS at 21:32

## 2017-08-29 RX ADMIN — DIPHENHYDRAMINE HYDROCHLORIDE 25 MG: 50 INJECTION, SOLUTION INTRAMUSCULAR; INTRAVENOUS at 16:07

## 2017-08-29 ASSESSMENT — ENCOUNTER SYMPTOMS
NAUSEA: 0
COUGH: 0
VOMITING: 0
HEADACHES: 1
SORE THROAT: 0
MYALGIAS: 1
FEVER: 1
DIARRHEA: 0

## 2017-08-29 NOTE — ED AVS SNAPSHOT
[facility] Emergency Department    [address]    [facility] [address]    Phone:  [phone]    Fax:  [phone]                                       Sterling Canales   MRN: [id number]    Department:  [facility] Emergency Department   Date of Visit:  8/29/2017           After Visit Summary Signature Page     I have received my discharge instructions, and my questions have been answered. I have discussed any challenges I see with this plan with the nurse or doctor.    ..........................................................................................................................................  Patient/Patient Representative Signature      ..........................................................................................................................................  Patient Representative Print Name and Relationship to Patient    ..................................................               ................................................  Date                                            Time    ..........................................................................................................................................  Reviewed by Signature/Title    ...................................................              ..............................................  Date                                                            Time

## 2017-08-29 NOTE — ED PROVIDER NOTES
History     Chief Complaint:  Headache and Fever      HPI   Sterling Canales is a 52 year old male with a history of MS currently on Aubagio who presents with headache and fever. The patient reports 5 days ago he developed a headache with associated neck stiffness, generalized myalgias, and fever, which he has been managing with excedrin migraine. Patient was seen in clinic yesterday and at that time they felt his symptoms were likely an exacerbation of his MS symptoms. The patient was unable tolerate his symptoms today which prompts his visit. Last measured fever was this morning at 100.6.  Headache is improved with ibuprofen but he still rates 5/10 and feels his headache is more severe that previous episodes. He did have some nausea yesterday which has since resolved. No cough, diarrhea, vomiting, sore throat. No recent travel. No concerns for tick exposure.     Allergies:  Pertussis vaccine  Amlodipine     Medications:    Sumatriptan (imitrex) 25 mg tablet  Hydrochlorothiazide (hydrodiuril) 25 mg tablet  Montelukast (singulair) 10 mg tablet  Betamethasone dipropionate (diprosone) 0.05 % cream  Albuterol (proventil hfa) 108 (90 base) mcg/act inhaler  Teriflunomide (aubagio) 14 mg tablet  Verapamil hcl cr 360 mg cp24  Cholecalciferol (vitamin d) 1000 unit tablet  Multiple vitamin (one-a-day essential po)  Ascorbic acid (vitamin c cr) 1000 mg tbcr    Past Medical History:    Asthma   MS  HTN    Past Surgical History:    Carpal tunnel release  Hernia repair  Bilateral Lasik    Family History:    HTN  Diabetes  Lipids  Breast cancer  Prostate cancer  Testicular cancer    Social History:  .  The patient denies smoking.   The patient consumes alcohol.      Review of Systems   Constitutional: Positive for fever.   HENT: Negative for sore throat.    Respiratory: Negative for cough.    Gastrointestinal: Negative for diarrhea, nausea and vomiting.   Musculoskeletal: Positive for myalgias.   Neurological: Positive  for headaches.       Physical Exam   First Vitals:  BP: (!) 174/101  Pulse: 93  Heart Rate: 93  Temp: 98.9  F (37.2  C)  Resp: 16  Weight: 79.4 kg (175 lb)  SpO2: 96 %    Physical Exam  General: Patient is alert and interactive when I enter the room  Head:  The scalp, face, and head appear normal  Eyes:  Conjunctivae are normal  ENT:    The nose is normal    Pinnae are normal    External acoustic canals are normal  Neck:  Trachea midline, good ROM  CV:  Pulses are normal.    Resp:  No respiratory distress   Abdomen:      Soft, non-tender, non-distended  Musc:  Normal muscular tone    No major joint effusions    No asymmetric leg swelling    Good capillary refill noted  Skin:  No rash or lesions noted  Neuro:  Speech is normal and fluent. Face is symmetric.     Moving all extremities well. No pronator drift.   Psych: Awake. Alert.  Normal affect.  Appropriate interactions.      Emergency Department Course   Imaging:  Radiographic findings were communicated with the patient who voiced understanding of the findings.    CT Head w/o contrast:  Diffuse cerebral volume loss and cerebral white matter  changes consistent with chronic small vessel ischemic disease. No  evidence for acute intracranial pathology.  Results per radiology.     Laboratory:    CSF Cell Count & Differential: WBC 4, RBC 3 (H), colorless and clear  CSF Glucose: 61  CSF Protein: 60  CSF Gram Stain & Culture: Pending    Enterovirus PCR: in process  HSV types 1/2 Qual PCR CSF: in process    CBC: WNL (WBC 6.2, HGB 14.6, )   INR: 0.90  CMP: K 3.1 (L), ow wnl (Creat 0.94)  Mononucleosis screen: negative    Procedures:  [facility] Procedure Note          Lumbar Puncture:      Performed by: Sayda Liu MD  Indications: headache and fever    Consent given by: Patient who states understanding of the procedure being performed after discussing the risks, benefits and alternatives.    Prior to the start of the procedure and with procedural staff  participation, I verbally confirmed the patient s identity using two indicators, relevant allergies, that the procedure was appropriate and matched the consent or emergent situation, and that the correct equipment/implants were available. Immediately prior to starting the procedure I conducted the Time Out with the procedural staff and re-confirmed the patient s name, procedure, and site/side. (The Joint Commission universal protocol was followed.) Yes    Under sterile conditions the patient was positioned L Lateral decubitus with knees drawn up. Betadine solution and sterile drapes were utilized.  Local anesthetic at the site: 2 ml of lidocaine 1% without epinephrine from the LP tray  A 22 G Pencil point spinal needle was inserted at the L 3-4 interspace.  Opening Pressurewas not checked.  A total of 4mL of clear and colorless spinal fluid was obtained and sent to the laboratory.   After the needle was removed, a bandaid and pressure were applied and the patient was instructed to stay horizontal until the results were back.    Complications:  None    Patient tolerance: Patient tolerated the procedure well with no immediate complications.    Interventions:  0.9% sodium chloride BOLUS (1,000 mLs Intravenous New Bag 8/29/17 1607)   diphenhydrAMINE (BENADRYL) injection 25 mg (25 mg Intravenous Given 8/29/17 1607)   metoclopramide (REGLAN) injection 10 mg (10 mg Intravenous Given 8/29/17 1608)   Toradol 15 mg IM    Emergency Department Course:  Nursing notes and vitals reviewed.  I performed an exam of the patient as documented above.     The work up above was undertaken.     The patient received the intervention(s) above.     The procedure detailed above was performed on the patient and he tolerated this well.     Findings and plan explained to the Patient. Patient discharged home with instructions regarding supportive care, medications, and reasons to return. The importance of close follow-up was reviewed.    Impression & Plan    Medical Decision Making:  Sterling Canales is a 52 year old male with a history of MS who presents with fever and headache. The patient has a long history of migraines and his current episode feels more severe than previous with reported fevers at home. He was afebrile here. Differential for headache and fever included viral/bacterial meningitis, mono, viral illness, pneumonia, among others. He appeared clinically well with minimal neck pain but given immunosuppression and his headache a decision was made to perform LP to rule out meningitis. A CT head was obtained given his history which is unremarkable. Labs were obtained which were also unremarkable with normal white blood cell count, normal electrolytes, and mono was negative. LP was performed which had 4 white blood cells and I do not feel he has meningitis. Gram stain was also negative. He had improvement of his headache with Toradol and compazine. He was able to PO challenge without issue. No other source of fever here. After long discussion the patient would like to go home and he was provided with strict return precautions. The patient was discharged.    Diagnosis:    ICD-10-CM    1. Episodic tension-type headache, not intractable G44.219    2. Fever, unspecified R50.9        Disposition:  Discharged.    David HERRERA, am serving as a scribe at 6:54 PM on 8/29/2017 to document services personally performed by Dr. Liu, based on my observations and the provider's statements to me.    [facility] EMERGENCY DEPARTMENT       Sayda Liu MD  08/30/17 0031

## 2017-08-29 NOTE — ED NOTES
Pt with headache, stiff neck, and fever for the past few days. Took Ibuprofen at 0730am, Excedrin migraine at 1430. ABC's intact, alert and oriented X3.

## 2017-08-29 NOTE — ED NOTES
Pt ambulated to and from bathroom independently, steady gait. Pt instructed to pull call light if need, pt verbalized understanding of this education.

## 2017-08-29 NOTE — ED AVS SNAPSHOT
[facility] Emergency Department    [address] [place] [address]    Phone:  [phone]    Fax:  [phone]                                       Sterling Canales   MRN: [id number]    Department:  [facility] Emergency Department   Date of Visit:  8/29/2017           Patient Information     Date Of Birth          1965        Your diagnoses for this visit were:     Episodic tension-type headache, not intractable     Fever, unspecified        You were seen by Sayda Liu MD.      Follow-up Information     Follow up with Donnell Arroyo PA-C In 3 days.    Specialty:  Physician Assistant    Contact information:    [address] [place] [address] [phone]          Discharge Instructions       Discharge Instructions  Headache    You were seen today for a headache. Headaches may be caused by many different things such as muscle tension, sinus inflammation, anxiety and stress, having too little sleep, too much alcohol, some medical conditions or injury. You may have a migraine, which is caused by changes in the blood vessels in your head.  At this time your doctor does not find that your headache is a sign of anything dangerous or life-threatening.  However, sometimes the signs of serious illness do not show up right away.  If you have new or worse symptoms, you may need to be seen again in the emergency department or by your primary doctor.      Return to the Emergency Department if:    You get a fever of 101 F or higher.    Your headache gets much worse.    You get a stiff neck with your headache.    You get a new headache that is different or worse than headaches you have had before.    You are vomiting and can t keep food or water down.    You have blurry or double vision or other problems with your eyes.    You have a new weakness on one side of your body.    You have difficulty with balance which is new.    You or your family thinks you are  confused.    You have a seizure or convulsion.    What can I do to help myself?    Pain medications - You may take a pain medication such as Tylenol  (acetaminophen), Advil , Nuprin  (ibuprofen) or Aleve  (naproxen).  If you have been given a narcotic such as Vicodin  (hydrocodone with acetaminophen), Percocet  (oxycodone with acetaminophen), codeine, or a muscle relaxant such as Flexeril  (cyclobenzaprine) or Soma  (carisoprodol), do not drive for four hours after you have taken it. If the narcotic contains Tylenol  (acetaminophen), do not take Tylenol  with it. All narcotics will cause constipation, so eat a high fiber diet.        Take a pain reliever as soon as you notice symptoms.  Starting medications as soon as you start to have symptoms may lessen the amount of pain you have.    Relaxing in a quiet, dark room may help.    Get enough sleep and eat meals regularly.    Schedule an appointment with your primary physician as instructed, or at least within 1 week.    You may need to watch for certain foods or other things which may trigger your headaches.  Keeping a journal of your headaches and possible triggers may help you and your primary doctor to identify things which you should avoid which may be causing your headaches.  If you were given a prescription for medicine here today, be sure to read all of the information (including the package insert) that comes with your prescription.  This will include important information about the medicine, its side effects, and any warnings that you need to know about.  The pharmacist who fills the prescription can provide more information and answer questions you may have about the medicine.  If you have questions or concerns that the pharmacist cannot address, please call or return to the Emergency Department.           24 Hour Appointment Hotline       To make an appointment at any [facility], call [phone] ([phone]). If you don't have a family doctor  or clinic, we will help you find one. [facility] are conveniently located to serve the needs of you and your family.             Review of your medicines      Our records show that you are taking the medicines listed below. If these are incorrect, please call your family doctor or clinic.        Dose / Directions Last dose taken    albuterol 108 (90 BASE) MCG/ACT Inhaler   Commonly known as:  PROVENTIL HFA   Dose:  1-2 puff   Quantity:  3 Inhaler        Inhale 1-2 puffs into the lungs every 6 hours as needed for shortness of breath / dyspnea   Refills:  1        Ascorbic Acid 1000 MG Tbcr        Take  by mouth.   Refills:  0        AUBAGIO 14 MG tablet   Dose:  14 mg   Generic drug:  teriflunomide        Take 14 mg by mouth daily Only available through select specialty pharmacies   Refills:  0        betamethasone dipropionate 0.05 % cream   Commonly known as:  DIPROSONE   Quantity:  45 g        APPLY TO AFFECTED AREAS ON ARMS, LEGS OR TRUNK TWICE DAILY FOR 10-14 CONSECUTIVE DAYS. NOT TO BE USED ON FACE OR GROIN.   Refills:  1        cholecalciferol 1000 UNIT tablet   Commonly known as:  vitamin D   Dose:  2000 Units   Quantity:  100 tablet        Take 2 tablets (2,000 Units) by mouth daily   Refills:  3        hydrochlorothiazide 25 MG tablet   Commonly known as:  HYDRODIURIL   Dose:  25 mg   Quantity:  90 tablet        Take 1 tablet (25 mg) by mouth daily   Refills:  1        montelukast 10 MG tablet   Commonly known as:  SINGULAIR   Quantity:  90 tablet        TAKE ONE TABLET BY MOUTH AT BEDTIME   Refills:  1        ONE-A-DAY ESSENTIAL PO        Take  by mouth.   Refills:  0        SUMAtriptan 25 MG tablet   Commonly known as:  IMITREX   Dose:  25-50 mg   Quantity:  9 tablet        Take 1-2 tablets (25-50 mg) by mouth at onset of headache for migraine May repeat in 2 hours. Max 8 tablets/24 hours.   Refills:  1        verapamil HCl  MG Cp24   Dose:  360 mg   Quantity:  30 capsule        Take 360 mg by  mouth daily   Refills:  5          ASK your doctor about these medications        Dose / Directions Last dose taken    ketorolac 60 MG/2ML Soln injection   Commonly known as:  TORADOL   Dose:  60 mg   Quantity:  2 mL   Ask about: Should I take this medication?        Inject 2 mLs (60 mg) into the muscle once for 1 dose   Refills:  0                Procedures and tests performed during your visit     CBC with platelets differential    CSF Culture Aerobic Bacterial    Cell count with differential CSF    Cell count with differential CSF: Tube 4    Comprehensive metabolic panel    Enterovirus PCR CSF    Glucose CSF: Tube 1    Gram stain    HSV Types 1 and 2 Qualitative PCR CSF: Tube 2    Head CT w/o contrast    INR    Mononucleosis screen    Protein total CSF: Tube 1      Orders Needing Specimen Collection     None      Pending Results     Date and Time Order Name Status Description    8/29/2017 1920 Enterovirus PCR CSF In process     8/29/2017 1920 HSV Types 1 and 2 Qualitative PCR CSF: Tube 2 In process     8/29/2017 1920 CSF Culture Aerobic Bacterial In process     8/29/2017 1920 Gram stain Preliminary     8/29/2017 1600 Head CT w/o contrast Preliminary             Pending Culture Results     Date and Time Order Name Status Description    8/29/2017 1920 Enterovirus PCR CSF In process     8/29/2017 1920 HSV Types 1 and 2 Qualitative PCR CSF: Tube 2 In process     8/29/2017 1920 CSF Culture Aerobic Bacterial In process     8/29/2017 1920 Gram stain Preliminary             Pending Results Instructions     If you had any lab results that were not finalized at the time of your Discharge, you can call the ED Lab Result RN at [phone]. You will be contacted by this team for any positive Lab results or changes in treatment. The nurses are available 7 days a week from 10A to 6:30P.  You can leave a message 24 hours per day and they will return your call.        Test Results From Your Hospital Stay        8/29/2017  4:19  PM      Component Results     Component Value Ref Range & Units Status    WBC 6.2 4.0 - 11.0 10e9/L Final    RBC Count 5.18 4.4 - 5.9 10e12/L Final    Hemoglobin 14.6 13.3 - 17.7 g/dL Final    Hematocrit 43.5 40.0 - 53.0 % Final    MCV 84 78 - 100 fl Final    MCH 28.2 26.5 - 33.0 pg Final    MCHC 33.6 31.5 - 36.5 g/dL Final    RDW 12.3 10.0 - 15.0 % Final    Platelet Count 219 150 - 450 10e9/L Final    Diff Method Automated Method  Final    % Neutrophils 58.8 % Final    % Lymphocytes 23.8 % Final    % Monocytes 14.2 % Final    % Eosinophils 2.3 % Final    % Basophils 0.6 % Final    % Immature Granulocytes 0.3 % Final    Nucleated RBCs 0 0 /100 Final    Absolute Neutrophil 3.6 1.6 - 8.3 10e9/L Final    Absolute Lymphocytes 1.5 0.8 - 5.3 10e9/L Final    Absolute Monocytes 0.9 0.0 - 1.3 10e9/L Final    Absolute Eosinophils 0.1 0.0 - 0.7 10e9/L Final    Absolute Basophils 0.0 0.0 - 0.2 10e9/L Final    Abs Immature Granulocytes 0.0 0 - 0.4 10e9/L Final    Absolute Nucleated RBC 0.0  Final         8/29/2017  4:27 PM      Component Results     Component Value Ref Range & Units Status    INR 0.90 0.86 - 1.14 Final         8/29/2017  4:36 PM      Component Results     Component Value Ref Range & Units Status    Sodium 137 133 - 144 mmol/L Final    Potassium 3.1 (L) 3.4 - 5.3 mmol/L Final    Chloride 100 94 - 109 mmol/L Final    Carbon Dioxide 29 20 - 32 mmol/L Final    Anion Gap 8 3 - 14 mmol/L Final    Glucose 99 70 - 99 mg/dL Final    Urea Nitrogen 14 7 - 30 mg/dL Final    Creatinine 0.94 0.66 - 1.25 mg/dL Final    GFR Estimate 84 >60 mL/min/1.7m2 Final    Non  GFR Calc    GFR Estimate If Black >90 >60 mL/min/1.7m2 Final    African American GFR Calc    Calcium 9.2 8.5 - 10.1 mg/dL Final    Bilirubin Total 0.6 0.2 - 1.3 mg/dL Final    Albumin 3.9 3.4 - 5.0 g/dL Final    Protein Total 7.4 6.8 - 8.8 g/dL Final    Alkaline Phosphatase 55 40 - 150 U/L Final    ALT 23 0 - 70 U/L Final    AST 19 0 - 45 U/L Final          8/29/2017  4:57 PM      Narrative     CT OF THE HEAD WITHOUT CONTRAST 8/29/2017 4:53 PM     COMPARISON: Brain MR 9/6/2012    HISTORY: Fever, headache.    TECHNIQUE: 5 mm thick axial CT images of the head were acquired  without IV contrast material.    FINDINGS: There is minimal diffuse cerebral volume loss. There are  subtle patchy areas of decreased density in the cerebral white matter  bilaterally that are consistent with sequela of chronic small vessel  ischemic disease.    The ventricles and basal cisterns are within normal limits in  configuration given the degree of cerebral volume loss. There is no  midline shift. There are no extra-axial fluid collections.    No intracranial hemorrhage, mass or recent infarct.    The visualized paranasal sinuses are well-aerated. There is no  mastoiditis. There are no fractures of the visualized bones.        Impression     IMPRESSION: Diffuse cerebral volume loss and cerebral white matter  changes consistent with chronic small vessel ischemic disease. No  evidence for acute intracranial pathology.        Radiation dose for this scan was reduced using automated exposure  control, adjustment of the mA and/or kV according to patient size, or  iterative reconstruction technique.              8/29/2017  8:11 PM      Component Results     Component Value Ref Range & Units Status    Glucose CSF 61 40 - 70 mg/dL Final    CSF glucose concentrations are about 60 percent of normal plasma glucose.         8/29/2017  8:11 PM      Component Results     Component Value Ref Range & Units Status    Protein Total CSF 60 15 - 60 mg/dL Final         8/29/2017  9:07 PM      Component Results     Component    Specimen Description    Gram Stain    No organisms seen    Gram Stain    Gram stain result is preliminary and awaits review of Microbiology Staff.         8/29/2017  7:44 PM         8/29/2017  8:32 PM      Component Results     Component Value Ref Range & Units Status    WBC CSF 4 0  - 5 /uL Final    RBC CSF 3 (H) 0 - 2 /uL Final    Tube Number 4 # Final    Color CSF Colorless CLRL^Colorless Final    Appearance CSF Clear CLER^Clear Final         8/29/2017  7:43 PM         8/29/2017  7:44 PM         8/29/2017  9:08 PM      Component Results     Component Value Ref Range & Units Status    WBC CSF  0 - 5 /uL Final    Test not performed. Criteria not met for second cell count.    RBC CSF  0 - 2 /uL Final    Test not performed. Criteria not met for second cell count.         8/29/2017 10:19 PM      Component Results     Component Value Ref Range & Units Status    Mononucleosis Screen Negative NEG^Negative Final                Clinical Quality Measure: Blood Pressure Screening     Your blood pressure was checked while you were in the emergency department today. The last reading we obtained was  BP: (!) 145/100 . Please read the guidelines below about what these numbers mean and what you should do about them.  If your systolic blood pressure (the top number) is less than 120 and your diastolic blood pressure (the bottom number) is less than 80, then your blood pressure is normal. There is nothing more that you need to do about it.  If your systolic blood pressure (the top number) is 120-139 or your diastolic blood pressure (the bottom number) is 80-89, your blood pressure may be higher than it should be. You should have your blood pressure rechecked within a year by a primary care provider.  If your systolic blood pressure (the top number) is 140 or greater or your diastolic blood pressure (the bottom number) is 90 or greater, you may have high blood pressure. High blood pressure is treatable, but if left untreated over time it can put you at risk for heart attack, stroke, or kidney failure. You should have your blood pressure rechecked by a primary care provider within the next 4 weeks.  If your provider in the emergency department today gave you specific instructions to follow-up with your doctor or  provider even sooner than that, you should follow that instruction and not wait for up to 4 weeks for your follow-up visit.        Thank you for choosing [facility]       Thank you for choosing [facility] for your care. Our goal is always to provide you with excellent care. Hearing back from our patients is one way we can continue to improve our services. Please take a few minutes to complete the written survey that you may receive in the mail after you visit with us. Thank you!        [facility] Information     [facility] gives you secure access to your electronic health record. If you see a primary care provider, you can also send messages to your care team and make appointments. If you have questions, please call your primary care clinic.  If you do not have a primary care provider, please call [phone] and they will assist you.        Care EveryWhere ID     This is your Care EveryWhere ID. This could be used by other organizations to access your [facility] medical records  [id number]        Equal Access to Services     BRINA GILMAN : Delvin Martins, kannan begum, jason quezada, reina darden . So [facility] [phone].    ATENCIÓN: Si habla español, tiene a edmondson disposición servicios gratuitos de asistencia lingüística. Karely al [phone].    We comply with applicable federal civil rights laws and [place] laws. We do not discriminate on the basis of race, color, national origin, age, disability sex, sexual orientation or gender identity.            After Visit Summary       This is your record. Keep this with you and show to your community pharmacist(s) and doctor(s) at your next visit.

## 2017-08-30 LAB
EV RNA SPEC QL NAA+PROBE: NEGATIVE
HSV1 DNA CSF QL NAA+PROBE: NOT DETECTED
HSV2 DNA CSF QL NAA+PROBE: NOT DETECTED
MICROBIOLOGIST REVIEW: NORMAL
SPECIMEN SOURCE: NORMAL

## 2017-08-30 NOTE — DISCHARGE INSTRUCTIONS

## 2017-09-01 ENCOUNTER — RADIANT APPOINTMENT (OUTPATIENT)
Dept: GENERAL RADIOLOGY | Facility: CLINIC | Age: 52
End: 2017-09-01
Attending: PHYSICIAN ASSISTANT
Payer: COMMERCIAL

## 2017-09-01 ENCOUNTER — OFFICE VISIT (OUTPATIENT)
Dept: FAMILY MEDICINE | Facility: CLINIC | Age: 52
End: 2017-09-01
Payer: COMMERCIAL

## 2017-09-01 ENCOUNTER — TELEPHONE (OUTPATIENT)
Dept: FAMILY MEDICINE | Facility: CLINIC | Age: 52
End: 2017-09-01

## 2017-09-01 VITALS
OXYGEN SATURATION: 96 % | DIASTOLIC BLOOD PRESSURE: 86 MMHG | HEART RATE: 96 BPM | BODY MASS INDEX: 26.69 KG/M2 | SYSTOLIC BLOOD PRESSURE: 116 MMHG | WEIGHT: 180.2 LBS | TEMPERATURE: 98.2 F | HEIGHT: 69 IN

## 2017-09-01 DIAGNOSIS — R52 BODY ACHES: ICD-10-CM

## 2017-09-01 DIAGNOSIS — R50.9 FEVER, UNSPECIFIED: ICD-10-CM

## 2017-09-01 DIAGNOSIS — R53.81 MALAISE AND FATIGUE: ICD-10-CM

## 2017-09-01 DIAGNOSIS — R53.83 MALAISE AND FATIGUE: ICD-10-CM

## 2017-09-01 DIAGNOSIS — R51.9 ACUTE INTRACTABLE HEADACHE, UNSPECIFIED HEADACHE TYPE: Primary | ICD-10-CM

## 2017-09-01 LAB
BASOPHILS # BLD AUTO: 0 10E9/L (ref 0–0.2)
BASOPHILS NFR BLD AUTO: 0.3 %
CRP SERPL-MCNC: <2.9 MG/L (ref 0–8)
DIFFERENTIAL METHOD BLD: NORMAL
EOSINOPHIL # BLD AUTO: 0.3 10E9/L (ref 0–0.7)
EOSINOPHIL NFR BLD AUTO: 4.4 %
ERYTHROCYTE [DISTWIDTH] IN BLOOD BY AUTOMATED COUNT: 12.5 % (ref 10–15)
ERYTHROCYTE [SEDIMENTATION RATE] IN BLOOD BY WESTERGREN METHOD: 7 MM/H (ref 0–20)
HCT VFR BLD AUTO: 44.1 % (ref 40–53)
HGB BLD-MCNC: 15.1 G/DL (ref 13.3–17.7)
LYMPHOCYTES # BLD AUTO: 1.7 10E9/L (ref 0.8–5.3)
LYMPHOCYTES NFR BLD AUTO: 25.6 %
MCH RBC QN AUTO: 28.9 PG (ref 26.5–33)
MCHC RBC AUTO-ENTMCNC: 34.2 G/DL (ref 31.5–36.5)
MCV RBC AUTO: 84 FL (ref 78–100)
MONOCYTES # BLD AUTO: 0.7 10E9/L (ref 0–1.3)
MONOCYTES NFR BLD AUTO: 11 %
NEUTROPHILS # BLD AUTO: 3.9 10E9/L (ref 1.6–8.3)
NEUTROPHILS NFR BLD AUTO: 58.7 %
PLATELET # BLD AUTO: 241 10E9/L (ref 150–450)
RBC # BLD AUTO: 5.23 10E12/L (ref 4.4–5.9)
RETICS # AUTO: 86.8 10E9/L (ref 25–95)
RETICS/RBC NFR AUTO: 1.6 % (ref 0.5–2)
WBC # BLD AUTO: 6.6 10E9/L (ref 4–11)

## 2017-09-01 PROCEDURE — 85045 AUTOMATED RETICULOCYTE COUNT: CPT | Performed by: PHYSICIAN ASSISTANT

## 2017-09-01 PROCEDURE — 85652 RBC SED RATE AUTOMATED: CPT | Performed by: PHYSICIAN ASSISTANT

## 2017-09-01 PROCEDURE — 85025 COMPLETE CBC W/AUTO DIFF WBC: CPT | Performed by: PHYSICIAN ASSISTANT

## 2017-09-01 PROCEDURE — 86038 ANTINUCLEAR ANTIBODIES: CPT | Performed by: PHYSICIAN ASSISTANT

## 2017-09-01 PROCEDURE — 99214 OFFICE O/P EST MOD 30 MIN: CPT | Performed by: PHYSICIAN ASSISTANT

## 2017-09-01 PROCEDURE — 86039 ANTINUCLEAR ANTIBODIES (ANA): CPT | Performed by: PHYSICIAN ASSISTANT

## 2017-09-01 PROCEDURE — 85060 BLOOD SMEAR INTERPRETATION: CPT | Performed by: INTERNAL MEDICINE

## 2017-09-01 PROCEDURE — 36415 COLL VENOUS BLD VENIPUNCTURE: CPT | Performed by: PHYSICIAN ASSISTANT

## 2017-09-01 PROCEDURE — 86140 C-REACTIVE PROTEIN: CPT | Performed by: PHYSICIAN ASSISTANT

## 2017-09-01 PROCEDURE — 71020 XR CHEST 2 VW: CPT

## 2017-09-01 NOTE — MR AVS SNAPSHOT
After Visit Summary   9/1/2017    Sterling Canales    MRN: [id number]           Patient Information     Date Of Birth          1965        Visit Information        Provider Department      9/1/2017 10:20 AM Donnell Arroyo PA-C [facility]        Today's Diagnoses     Acute intractable headache, unspecified headache type    -  1    Malaise and fatigue        Body aches        Fever, unspecified           Follow-ups after your visit        Additional Services     INFECTIOUS DISEASE REFERRAL       Your provider has referred you to: [facility]ET: [facility]  [place] [phone]   [email or web address]    Please be aware that coverage of these services is subject to the terms and limitations of your health insurance plan.  Call member services at your health plan with any benefit or coverage questions.      Please bring the following with you to your appointment:    (1) Any X-Rays, CTs or MRIs which have been performed.  Contact the facility where they were done to arrange for  prior to your scheduled appointment.    (2) List of current medications   (3) This referral request   (4) Any documents/labs given to you for this referral                  Who to contact     If you have questions or need follow up information about today's clinic visit or your schedule please contact [facility] directly at [phone].  Normal or non-critical lab and imaging results will be communicated to you by MyChart, letter or phone within 4 business days after the clinic has received the results. If you do not hear from us within 7 days, please contact the clinic through MyChart or phone. If you have a critical or abnormal lab result, we will notify you by phone as soon as possible.  Submit refill requests through [facility] or call your pharmacy and they will forward the refill request to us. Please allow 3 business days for your refill to be completed.  "         Additional Information About Your Visit        MyChart Information     [employer] gives you secure access to your electronic health record. If you see a primary care provider, you can also send messages to your care team and make appointments. If you have questions, please call your primary care clinic.  If you do not have a primary care provider, please call [phone] and they will assist you.        Care EveryWhere ID     This is your Care EveryWhere ID. This could be used by other organizations to access your [place] medical records  [id number]        Your Vitals Were     Pulse Temperature Height Pulse Oximetry BMI (Body Mass Index)       96 98.2  F (36.8  C) (Oral) 5' 8.75\" (1.746 m) 96% 26.8 kg/m2        Blood Pressure from Last 3 Encounters:   09/01/17 116/86   08/29/17 (!) 145/100   08/28/17 128/86    Weight from Last 3 Encounters:   09/01/17 180 lb 3.2 oz (81.7 kg)   08/29/17 175 lb (79.4 kg)   08/28/17 177 lb 14.4 oz (80.7 kg)              We Performed the Following     Anti Nuclear Fariba IgG by IFA with Reflex     Bld morphology pathology review     CBC with platelets differential     CRP inflammation     ESR: Erythrocyte sedimentation rate     INFECTIOUS DISEASE REFERRAL        Primary Care Provider Office Phone # Fax #    Donnell Arroyo PA-C [phone] [address] [facility] [address]        Equal Access to Services     AUBREY GILMAN AH: Hadii aad ku hadasho Soomaali, waaxda luqadaha, qaybta kaalmada adeegyada, reina blank hayaan lukas fultonarabecca la'sandie . So [facility] [phone].    ATENCIÓN: Si habla español, tiene a edmondson disposición servicios gratuitos de asistencia lingüística. Llame al [phone].    We comply with applicable federal civil rights laws and [place] laws. We do not discriminate on the basis of race, color, national origin, age, disability sex, sexual orientation or gender identity.            Thank you!     Thank you for choosing [facility] [place] " MAHENDRA  for your care. Our goal is always to provide you with excellent care. Hearing back from our patients is one way we can continue to improve our services. Please take a few minutes to complete the written survey that you may receive in the mail after your visit with us. Thank you!             Your Updated Medication List - Protect others around you: Learn how to safely use, store and throw away your medicines at www.disposemymeds.org.          This list is accurate as of: 9/1/17 11:39 AM.  Always use your most recent med list.                   Brand Name Dispense Instructions for use Diagnosis    albuterol 108 (90 BASE) MCG/ACT Inhaler    PROVENTIL HFA    3 Inhaler    Inhale 1-2 puffs into the lungs every 6 hours as needed for shortness of breath / dyspnea    Mild persistent asthma       Ascorbic Acid 1000 MG Tbcr      Take  by mouth.        AUBAGIO 14 MG tablet   Generic drug:  teriflunomide      Take 14 mg by mouth daily Only available through select specialty pharmacies        betamethasone dipropionate 0.05 % cream    DIPROSONE    45 g    APPLY TO AFFECTED AREAS ON ARMS, LEGS OR TRUNK TWICE DAILY FOR 10-14 CONSECUTIVE DAYS. NOT TO BE USED ON FACE OR GROIN.    Eczema, unspecified type       cholecalciferol 1000 UNIT tablet    vitamin D    100 tablet    Take 2 tablets (2,000 Units) by mouth daily        hydrochlorothiazide 25 MG tablet    HYDRODIURIL    90 tablet    Take 1 tablet (25 mg) by mouth daily    Hypertension goal BP (blood pressure) < 140/90       montelukast 10 MG tablet    SINGULAIR    90 tablet    TAKE ONE TABLET BY MOUTH AT BEDTIME    Mild persistent asthma       ONE-A-DAY ESSENTIAL PO      Take  by mouth.        SUMAtriptan 25 MG tablet    IMITREX    9 tablet    Take 1-2 tablets (25-50 mg) by mouth at onset of headache for migraine May repeat in 2 hours. Max 8 tablets/24 hours.    Acute nonintractable headache, unspecified headache type       verapamil HCl  MG Cp24     30 capsule     Take 360 mg by mouth daily    Hypertension goal BP (blood pressure) < 140/90

## 2017-09-01 NOTE — TELEPHONE ENCOUNTER
Patient wife Faby is calling to let you know that the Infectious Disease clinic  Cannot see him until November. They felt you wanted him seen sooner.  They told her to see if you would call to get him in before then. Please  Let them know what they should do, or if there is anywhere else he can go?    Cheryl Barrios, SMILEY  Triage Nurse

## 2017-09-01 NOTE — TELEPHONE ENCOUNTER
FYI:    Called [facility] and they are able to see him Wednesday at 10:30 am in [place].   Left message for wife, also called home number, left message there as well.  Address is [address].  Cheryl Barrios, RN  Triage Nurse

## 2017-09-01 NOTE — TELEPHONE ENCOUNTER
Please try them again with info below and that his blood work so far looks great, along with chest xray.

## 2017-09-01 NOTE — TELEPHONE ENCOUNTER
Can we try to call ID and see if he can be seen sooner ie sometime next week? [facility] - [place] [phone]   [email or web address]

## 2017-09-01 NOTE — PROGRESS NOTES
"  SUBJECTIVE:   Sterling Canales is a 52 year old male who presents to clinic today for the following health issues:    ED/UC Followup:    Facility:  [facility]  Date of visit: 8/29/17  Reason for visit: Fever and HA  Current Status: felling the same, fever, HA, fatigue, dizziness, body aches, chills       Patient presents for continuation of symptoms - fevers, HA, body aches, pains, dizziness and weakness  Neck pain improved  Once he'll start feeling \"chilly\" they will take his temperature - getting 99.9 and upper 100s, -Taking medications (ibuprofen, tylenol) q 4-6 hours to keep fevers at these levels  -There is no vomiting or cough  -Denies any vision changes  -no urinary change  -Did discuss with neurology; recommended holding on steroids until fevers stop  -Not moving bowels, denies any abdominal pain    Problem list and histories reviewed & adjusted, as indicated.  Additional history: as documented    Patient Active Problem List   Diagnosis     Mild persistent asthma     Allergic rhinitis     Carpal tunnel syndrome     Malaise and fatigue     MS (multiple sclerosis) (H)     Headache     CARDIOVASCULAR SCREENING; LDL GOAL LESS THAN 160     Hypertension goal BP (blood pressure) < 140/90     Past Surgical History:   Procedure Laterality Date     CARPAL TUNNEL RELEASE RT/LT  6/08    right     HERNIA REPAIR      bilateral inguinal; 2 separate occasions.     SURGICAL HISTORY OF -       lasik bilat       Social History   Substance Use Topics     Smoking status: Never Smoker     Smokeless tobacco: Never Used     Alcohol use 0.0 - 1.0 oz/week     0 - 2 Standard drinks or equivalent per week     Family History   Problem Relation Age of Onset     Hypertension Father      DIABETES Father      type 2     Lipids Mother      Breast Cancer Mother 67     Prostate Cancer Paternal Grandfather      dx'd late 70s     CANCER Brother 35     testicular CA             Reviewed and updated as needed this visit by clinical " "staffTobacco  Allergies  Med Hx  Surg Hx  Fam Hx  Soc Hx      Reviewed and updated as needed this visit by Provider         ROS:  See Above    OBJECTIVE:     /86 (BP Location: Right arm, Cuff Size: Adult Large)  Pulse 96  Temp 98.2  F (36.8  C) (Oral)  Ht 5' 8.75\" (1.746 m)  Wt 180 lb 3.2 oz (81.7 kg)  SpO2 96%  BMI 26.8 kg/m2  Body mass index is 26.8 kg/(m^2).  GENERAL: healthy, alert and no distress  EYES: Eyes grossly normal to inspection, PERRL and conjunctivae and sclerae normal  HENT: ear canals and TM's normal, nose and mouth without ulcers or lesions  NECK: no adenopathy  RESP: lungs clear to auscultation - no rales, rhonchi or wheezes  CV: regular rates and rhythm, normal S1 S2, no S3 or S4 and no murmur, click or rub  ABDOMEN: soft, nontender, no hepatosplenomegaly, no masses and bowel sounds normal  MS: no gross musculoskeletal defects noted, no edema  SKIN: no suspicious lesions or rashes  NEURO: Normal strength and tone, mentation intact and speech normal  PSYCH: mentation appears normal, affect normal/bright    Diagnostic Test Results:  See A/P    ASSESSMENT/PLAN:   1. Acute intractable headache, unspecified headache type  2. Malaise and fatigue  3. Body aches  4. Fever, unspecified  Uncertain etiology for persistent symptoms. His LP at the ED was negative. CBCs had trended largely wnl. I do not observe any gross abnormalities on the examination. Certainly I think his elevated temps are increasing his MS symptoms but am unsure re cause of fevers/chills. He continues to deny any gross tick exposure. At this point will update chest xray, and grab some autoimmune labs as well as again trending the cbc with smear. Did consider [place]/tickborne/other infectious labs but I will refer him to ID to get their recommendation/consideration. Patient agrees with plan; seeking earlier care with any change to symptoms.   - CBC with platelets differential  - ESR: Erythrocyte sedimentation rate  - " CRP inflammation  - Anti Nuclear Fariba IgG by IFA with Reflex  - XR Chest 2 Views; Future  - Bld morphology pathology review  - INFECTIOUS DISEASE REFERRAL    Donnell Arroyo PA-C  [facility]

## 2017-09-01 NOTE — NURSING NOTE
"Chief Complaint   Patient presents with     ER F/U       Initial /86 (BP Location: Right arm, Cuff Size: Adult Large)  Pulse 96  Temp 98.2  F (36.8  C) (Oral)  Ht 5' 8.75\" (1.746 m)  Wt 180 lb 3.2 oz (81.7 kg)  SpO2 96%  BMI 26.8 kg/m2 Estimated body mass index is 26.8 kg/(m^2) as calculated from the following:    Height as of this encounter: 5' 8.75\" (1.746 m).    Weight as of this encounter: 180 lb 3.2 oz (81.7 kg).  Medication Reconciliation: complete   Latrell Marina CMA      "

## 2017-09-03 LAB
BACTERIA SPEC CULT: NO GROWTH
Lab: NORMAL
SPECIMEN SOURCE: NORMAL

## 2017-09-05 LAB — COPATH REPORT: NORMAL

## 2017-09-05 NOTE — TELEPHONE ENCOUNTER
Checked to make sure they had information on appt with Infectious Disease. Informed him of lab work.  Latrell Marina CMA

## 2017-09-06 ENCOUNTER — TELEPHONE (OUTPATIENT)
Dept: FAMILY MEDICINE | Facility: CLINIC | Age: 52
End: 2017-09-06

## 2017-09-06 NOTE — TELEPHONE ENCOUNTER
Pts wife, Faby calls.      Pt saw Kennedy last Friday.  They saw ID doctor today.  It will be a few more days to a week before he goes back to work.  He needs a Leave of absence form filled out for short term disability.  Can you fill that out for him?  Does he need an appt?      Pts wife can be reached at [phone].    Will forward to Kennedy Arroyo.

## 2017-09-07 LAB
ANA PAT SER IF-IMP: ABNORMAL
ANA SER QL IF: POSITIVE
ANA TITR SER IF: ABNORMAL {TITER}

## 2017-09-07 NOTE — TELEPHONE ENCOUNTER
Patient calling regarding this again, wondering if needs to have appointment or if he can just drop the forms off at the  to be completed. Please advise.  -Glory Dhillon

## 2017-09-07 NOTE — TELEPHONE ENCOUNTER
Wife is calling and asking about the forms. He needs them filled out because they have to be turned in by Firday. Wifes number is [phone]. Please call her when they are done.

## 2017-09-08 ENCOUNTER — TELEPHONE (OUTPATIENT)
Dept: FAMILY MEDICINE | Facility: CLINIC | Age: 52
End: 2017-09-08

## 2017-09-08 NOTE — TELEPHONE ENCOUNTER
Patient wife is calling back again to ask about this form-will you be able to fill these forms out.  If not, they would like to know how to proceed, or who to ask.  He is seeing neurology on Monday.  Saw Infectious Disease and they did some further testing for lymes,  [place], and did blood cultures. They were not really sure what is causing the fevers.  They also had him stop the ibuprofen and tylenol, to see if it was masking something.  Temps went back up to 101. He is still not able to work, and needs the forms filled out as  Soon as possible. She can drop them off if you would fill them out. Do you want to see him?    Cheryl Barrios, RN  Triage Nurse

## 2017-09-08 NOTE — TELEPHONE ENCOUNTER
Reason for call:  Form   Our goal is to have forms completed within 72 hours, however some forms may require a visit or additional information.     Who is the form from? Patient  Where did the form come from? Patient or family brought in     What clinic location was the form placed at? [place]  Where was the form placed? 's Box  What number is listed as a contact on the form? [phone] or [phone]    Phone call message - patient request for a letter, form or note:     Date needed: at your convenience  Please fax to [phone]  Has the patient signed a consent form for release of information? Not Applicable    Additional comments:     Type of letter, form or note: disability      Phone number to reach patient:  Home number on file [phone] (home)    Best Time:  any    Can we leave a detailed message on this number?  YES

## 2017-09-08 NOTE — TELEPHONE ENCOUNTER
Called Faby to let her know about forms and lab results.   Left a message at her cell to drop forms off.  Called home number and talked to Sterling. He was notified  Of the DAMIÁN test, this can show possible auto-immune issue,  Or could be thrown off due to his MS. He will have Faby drop forms   Off.  Cheryl Barrios, SMILEY  Triage Nurse

## 2017-09-08 NOTE — TELEPHONE ENCOUNTER
Yes have her drop them off. I will filll these out.   Please let them know that his DAMIÁN test came back positive. He could share this with his infectious disease group as well. We may need to consider having him meet with rheumatology as well to rule out any auto-immune disease as possible etiology

## 2017-09-11 ENCOUNTER — TRANSFERRED RECORDS (OUTPATIENT)
Dept: HEALTH INFORMATION MANAGEMENT | Facility: CLINIC | Age: 52
End: 2017-09-11

## 2017-09-12 DIAGNOSIS — R76.8 POSITIVE ANA (ANTINUCLEAR ANTIBODY): ICD-10-CM

## 2017-09-12 DIAGNOSIS — R50.9 FEVER, UNSPECIFIED: Primary | ICD-10-CM

## 2017-09-12 DIAGNOSIS — G35 MS (MULTIPLE SCLEROSIS) (H): ICD-10-CM

## 2017-09-12 NOTE — PROGRESS NOTES
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 Cameron garcia infectious disease). No recent international travel. 4th of July went to [facility].     It is good that fever is already improving after stopping teriflunomide 9/5/17  DAMIÁN may be associated with multiple sclerosis.   If patient's myalgia and fatigue also not start improving in a week, I recommended that he completes this additional work up which would include for systemic lupus and associated conditions. Also other viral infections.     The labs, imaging from patient records are reviewed.     I will be back in touch with the patient through mychart/letter when results are available.     Most Recent Immunizations   Administered Date(s) Administered     Influenza (IIV3) 11/11/2014     TD (ADULT, 7+) 02/24/2010       Orders Placed This Encounter   Procedures     CK total     DNA double stranded antibodies     Complement C4     Complement C3     FLORECITA antibody panel     Rheumatoid factor     Cyclic Citrullinated Peptide Antibody IgG     Parvovirus B19 Antibody IgM     Hepatitis B surface antigen     Hepatitis C antibody       Return in about 2 weeks (around 9/27/2017). This f/u is not needed if his symptoms improve.     There are no discontinued medications.  Current Outpatient Prescriptions   Medication Sig Dispense Refill     SUMAtriptan (IMITREX) 25 MG tablet Take 1-2 tablets (25-50 mg) by mouth at onset of headache for migraine May repeat in 2 hours. Max 8 tablets/24 hours. 9  tablet 1     hydrochlorothiazide (HYDRODIURIL) 25 MG tablet Take 1 tablet (25 mg) by mouth daily 90 tablet 1     montelukast (SINGULAIR) 10 MG tablet TAKE ONE TABLET BY MOUTH AT BEDTIME 90 tablet 1     betamethasone dipropionate (DIPROSONE) 0.05 % cream APPLY TO AFFECTED AREAS ON ARMS, LEGS OR TRUNK TWICE DAILY FOR 10-14 CONSECUTIVE DAYS. NOT TO BE USED ON FACE OR GROIN. 45 g 1     albuterol (PROVENTIL HFA) 108 (90 BASE) MCG/ACT Inhaler Inhale 1-2 puffs into the lungs every 6 hours as needed for shortness of breath / dyspnea 3 Inhaler 1     verapamil HCl  MG CP24 Take 360 mg by mouth daily 30 capsule 5     cholecalciferol (VITAMIN D) 1000 UNIT tablet Take 2 tablets (2,000 Units) by mouth daily 100 tablet 3     Multiple Vitamin (ONE-A-DAY ESSENTIAL PO) Take  by mouth.       Ascorbic Acid (VITAMIN C CR) 1000 MG TBCR Take  by mouth.       teriflunomide (AUBAGIO) 14 MG tablet Take 14 mg by mouth daily Only available through select specialty pharmacies         Manish De La Torre MD  [facility]          Active Problem List:     Patient Active Problem List    Diagnosis Date Noted     Fever, unknown origin 09/13/2017     Priority: Medium     Hypertension goal BP (blood pressure) < 140/90 11/01/2011     Priority: Medium     CARDIOVASCULAR SCREENING; LDL GOAL LESS THAN 160 10/31/2010     Priority: Medium     MS (multiple sclerosis) (H) 02/23/2009     Priority: Medium     Followed by Dr. Gutiérrez, [facility].         Headache 02/23/2009     Priority: Medium     Problem list name updated by automated process. Provider to review       Carpal tunnel syndrome 01/14/2008     Priority: Medium     Malaise and fatigue 01/14/2008     Priority: Medium     Problem list name updated by automated process. Provider to review       Mild persistent asthma 11/28/2005     Priority: Medium     Allergic rhinitis 11/28/2005     Priority: Medium     Problem list name updated by automated process. Provider to  review              History of Present Illness:     Chief Complaint   Patient presents with     [facility]       September 12, 2017  Have you ever seen a rheumatologist NO Who NA When NA  Joint pain history  Onset: pt has had fevers (started getting better few days ago-99.1 to 98.3; took six ibuprofen yesterday), body aches, headaches  (on and off), weakness and dizziness for the last 3 weeks  Involved joints: entire body  Pain scale:  5/10  (not improving)   Wakes the patient from sleep : Yes  Morning stiffness:Yes for 120 minutes  Meds used: teriflunomide last dose 9/5/17    Fatigue (not improving)     Interim history  Since last visit:  1. Infections - No  2. New symptoms/medical problem - Yes/ pt has MS, has had problems with constipation  3. Any side effects from Rheum medications -NA  3. ER visits/Hospitalizations/surgeries - Yes  4. Last PCP visit: 1 week ago      Wt Readings from Last 4 Encounters:   09/13/17 79.8 kg (176 lb)   09/01/17 81.7 kg (180 lb 3.2 oz)   08/29/17 79.4 kg (175 lb)   08/28/17 80.7 kg (177 lb 14.4 oz)     loss of appetite +ve  Fatigue 8/10  Photosensitivity +; malar rash with sun exposure X chronic    EBV, CMV, blood cultures, lymes, west nile were all negative per patient (Dr. Cameron garcia infectious disease)    No h/o unintentional weight loss, swollen glands  No h/o gout  No family or personal history of psoriasis, ulcerative colitis or chron's disease. No h/o iritis.   Patient denies any raynauds, recurrent mouth ulcers, arterial/venous thrombosis in the past  No h/o persistent shortness of breath, cough, chest pain  No h/o persistent nausea, vomiting, constipation, diarrhea, abdominal pain  No h/o hematochezia, hematuria, hemoptysis, hematemesis  No h/o seizures or strokes  No h/o cancer    BP Readings from Last 3 Encounters:   09/13/17 118/88   09/01/17 116/86   08/29/17 (!) 145/100            Review of Systems:   Complete ROS negative except for symptoms mentioned in the HPI           Past Medical History:     Past Medical History:   Diagnosis Date     Allergic rhinitis, cause unspecified     Allergic rhinitis     ASTHMA - MILD PERSISTENT 11/28/2005     Multiple sclerosis (H) 1994     Unspecified essential hypertension     Essential hypertension     Past Surgical History:   Procedure Laterality Date     CARPAL TUNNEL RELEASE RT/LT  6/08    right     HERNIA REPAIR      bilateral inguinal; 2 separate occasions.     SURGICAL HISTORY OF -       lasik bilat            Social History:     Social History     Occupational History     machine dev specialist      [employer]     Social History Main Topics     Smoking status: Never Smoker     Smokeless tobacco: Never Used     Alcohol use 0.0 - 1.0 oz/week     0 - 2 Standard drinks or equivalent per week      Comment: rarely     Drug use: No     Sexual activity: Yes     Partners: Female      Comment: wife tubal            Family History:     Family History   Problem Relation Age of Onset     Hypertension Father      DIABETES Father      type 2     Lipids Mother      Breast Cancer Mother 67     Prostate Cancer Paternal Grandfather      dx'd late 70s     CANCER Brother 35     testicular CA            Allergies:     Allergies   Allergen Reactions     Amlodipine Swelling     Pertussis Vaccine             Medications:     Current Outpatient Prescriptions   Medication Sig Dispense Refill     SUMAtriptan (IMITREX) 25 MG tablet Take 1-2 tablets (25-50 mg) by mouth at onset of headache for migraine May repeat in 2 hours. Max 8 tablets/24 hours. 9 tablet 1     hydrochlorothiazide (HYDRODIURIL) 25 MG tablet Take 1 tablet (25 mg) by mouth daily 90 tablet 1     montelukast (SINGULAIR) 10 MG tablet TAKE ONE TABLET BY MOUTH AT BEDTIME 90 tablet 1     betamethasone dipropionate (DIPROSONE) 0.05 % cream APPLY TO AFFECTED AREAS ON ARMS, LEGS OR TRUNK TWICE DAILY FOR 10-14 CONSECUTIVE DAYS. NOT TO BE USED ON FACE OR GROIN. 45 g 1     albuterol (PROVENTIL HFA) 108 (90  "BASE) MCG/ACT Inhaler Inhale 1-2 puffs into the lungs every 6 hours as needed for shortness of breath / dyspnea 3 Inhaler 1     verapamil HCl  MG CP24 Take 360 mg by mouth daily 30 capsule 5     cholecalciferol (VITAMIN D) 1000 UNIT tablet Take 2 tablets (2,000 Units) by mouth daily 100 tablet 3     Multiple Vitamin (ONE-A-DAY ESSENTIAL PO) Take  by mouth.       Ascorbic Acid (VITAMIN C CR) 1000 MG TBCR Take  by mouth.       teriflunomide (AUBAGIO) 14 MG tablet Take 14 mg by mouth daily Only available through select specialty pharmacies              Physical Exam:   Blood pressure 118/88, pulse 112, temperature 98.7  F (37.1  C), temperature source Oral, height 1.74 m (5' 8.5\"), weight 79.8 kg (176 lb), SpO2 97 %.  Wt Readings from Last 4 Encounters:   09/13/17 79.8 kg (176 lb)   09/01/17 81.7 kg (180 lb 3.2 oz)   08/29/17 79.4 kg (175 lb)   08/28/17 80.7 kg (177 lb 14.4 oz)       Constitutional: well-developed, appearing stated age; cooperative  Eyes: PERRLA, normal conjunctiva, sclera  ENT: nl external ears, nose, lips.No mucous membrane lesions, normal saliva pool  Neck: no cervical lymphadenopathy  Resp: lungs clear to auscultation,   CV: RRR, no added sounds, no edema  GI: Abdomen soft and no tenderness  : not tested  Lymph: no cervical, supraclavicular or epitrochlear nodes  MS: All shoulder, elbow, wrist, MCP/PIP/DIP, hip, knee, ankle, and foot MTP/IP joints were examined and  found normal. No active synovitis or deformity. Full ROM.  Fist 100%.  No dactylitis,  tenosynovitis, enthesopathy.  Skin: no rash in exposed areas  Psych: nl judgement, orientation, memory, affect.         Data:   Reviewed following labs:  CBC RESULTS:   Recent Labs   Lab Test  09/01/17   1106   WBC  6.6   RBC  5.23   HGB  15.1   HCT  44.1   MCV  84   MCH  28.9   MCHC  34.2   RDW  12.5   PLT  241       Liver Function Studies -   Recent Labs   Lab Test  08/29/17   1604   PROTTOTAL  7.4   ALBUMIN  3.9   BILITOTAL  0.6   ALKPHOS  " 55   AST  19   ALT  23       Creatinine   Date Value Ref Range Status   08/29/2017 0.94 0.66 - 1.25 mg/dL Final   ]    No results found for: URIC]    ESR/CRP  Recent Labs   Lab Test  09/01/17   1106  01/17/17   1609  10/03/12   1556  11/08/11   1721   SED  7   --   4  4   CRP  <2.9  <2.9   --   <5.0       HLA-B27  No results for input(s): [id number], B1 in the last 25950 hours.    RF/CCP  No results for input(s): CCPABY, CCPIGG, CYCLICCITPEP, RHF in the last 88182 hours.    DAMIÁN/RNP/Sm/SSA/SSB  Recent Labs   Lab Test  01/17/17   1609  10/03/12   1556   VALENTINA  <1.0  Interpretation:  Negative    <1.0 Interpretation:  Negative   ENASSA   --   2   ENASSB   --   0         Manish De La Torre MD    [facility]

## 2017-09-13 ENCOUNTER — OFFICE VISIT (OUTPATIENT)
Dept: RHEUMATOLOGY | Facility: CLINIC | Age: 52
End: 2017-09-13
Payer: COMMERCIAL

## 2017-09-13 VITALS
WEIGHT: 176 LBS | OXYGEN SATURATION: 97 % | BODY MASS INDEX: 26.07 KG/M2 | DIASTOLIC BLOOD PRESSURE: 88 MMHG | TEMPERATURE: 98.7 F | HEIGHT: 69 IN | SYSTOLIC BLOOD PRESSURE: 118 MMHG | HEART RATE: 112 BPM

## 2017-09-13 DIAGNOSIS — R50.9 FEVER, UNKNOWN ORIGIN: Primary | ICD-10-CM

## 2017-09-13 PROCEDURE — 99204 OFFICE O/P NEW MOD 45 MIN: CPT | Performed by: INTERNAL MEDICINE

## 2017-09-13 ASSESSMENT — ROUTINE ASSESSMENT OF PATIENT INDEX DATA (RAPID3)
TOTAL RAPID3 SCORE: 13.7
RAPID3 INTERPRETATION: HIGH > 12.0

## 2017-09-13 NOTE — LETTER
[address] [address]  [phone]        December 7, 2017    Sterling Canales  [address] [place] [address]              Dear Sterling Canales    This is to remind you that your fastimg labs is due.    You may call our office at  [phone]  to schedule an appointment.    Please disregard this notice if you have already had your labs drawn or made an appointment.        Sincerely,        Manish De La Torre

## 2017-09-13 NOTE — LETTER
[address] [address]  [phone]        November 2, 2017    Sterling Canales  [address] [place] [address]              Dear Sterling Canales    This is to remind you that your multiple labs are due.    You may call our office at [phone] to schedule an appointment.    Please disregard this notice if you have already had your labs drawn or made an appointment.        Sincerely,        Manish De La Torre MD

## 2017-09-13 NOTE — MR AVS SNAPSHOT
"              After Visit Summary   9/13/2017    Sterling Canales    MRN: [id number]           Patient Information     Date Of Birth          1965        Visit Information        Provider Department      9/13/2017 11:00 AM Manish De La Torre MD [facility]        Today's Diagnoses     Fever, unknown origin    -  1       Follow-ups after your visit        Follow-up notes from your care team     Return in about 2 weeks (around 9/27/2017).      Who to contact     If you have questions or need follow up information about today's clinic visit or your schedule please contact [facility] directly at [phone].  Normal or non-critical lab and imaging results will be communicated to you by MyChart, letter or phone within 4 business days after the clinic has received the results. If you do not hear from us within 7 days, please contact the clinic through Navmii[employer] or phone. If you have a critical or abnormal lab result, we will notify you by phone as soon as possible.  Submit refill requests through [employer] or call your pharmacy and they will forward the refill request to us. Please allow 3 business days for your refill to be completed.          Additional Information About Your Visit        MyChart Information     [employer] gives you secure access to your electronic health record. If you see a primary care provider, you can also send messages to your care team and make appointments. If you have questions, please call your primary care clinic.  If you do not have a primary care provider, please call [phone] and they will assist you.        Care EveryWhere ID     This is your Care EveryWhere ID. This could be used by other organizations to access your [facility] medical records  [id number]        Your Vitals Were     Pulse Temperature Height Pulse Oximetry BMI (Body Mass Index)       112 98.7  F (37.1  C) (Oral) 1.74 m (5' 8.5\") 97% 26.37 kg/m2        Blood " Pressure from Last 3 Encounters:   09/13/17 118/88   09/01/17 116/86   08/29/17 (!) 145/100    Weight from Last 3 Encounters:   09/13/17 79.8 kg (176 lb)   09/01/17 81.7 kg (180 lb 3.2 oz)   08/29/17 79.4 kg (175 lb)              We Performed the Following     CK total     Complement C3     Complement C4     Cyclic Citrullinated Peptide Antibody IgG     DNA double stranded antibodies     FLORECITA antibody panel     Hepatitis B surface antigen     Hepatitis C antibody     Parvovirus B19 Antibody IgM     Rheumatoid factor        Primary Care Provider Office Phone # Fax #    Donnell Arroyo PA-C [phone]       [address] [facility] [address]        Equal Access to Services     BRINA GILMAN : Hadii aad ku hadasho Soomaali, waaxda luqadaha, qaybta kaalmada adeegyada, waxay timain haysandie darden . So [facility] [phone].    ATENCIÓN: Si habla español, tiene a edmondson disposición servicios gratuitos de asistencia lingüística. Ll[facility] [phone].    We comply with applicable federal civil rights laws and [place] laws. We do not discriminate on the basis of race, color, national origin, age, disability sex, sexual orientation or gender identity.            Thank you!     Thank you for choosing [facility]  for your care. Our goal is always to provide you with excellent care. Hearing back from our patients is one way we can continue to improve our services. Please take a few minutes to complete the written survey that you may receive in the mail after your visit with us. Thank you!             Your Updated Medication List - Protect others around you: Learn how to safely use, store and throw away your medicines at www.disposemymeds.org.          This list is accurate as of: 9/13/17 11:58 AM.  Always use your most recent med list.                   Brand Name Dispense Instructions for use Diagnosis    albuterol 108 (90 BASE) MCG/ACT Inhaler    PROVENTIL HFA    3  Inhaler    Inhale 1-2 puffs into the lungs every 6 hours as needed for shortness of breath / dyspnea    Mild persistent asthma       Ascorbic Acid 1000 MG Tbcr      Take  by mouth.        AUBAGIO 14 MG tablet   Generic drug:  teriflunomide      Take 14 mg by mouth daily Only available through select specialty pharmacies        betamethasone dipropionate 0.05 % cream    DIPROSONE    45 g    APPLY TO AFFECTED AREAS ON ARMS, LEGS OR TRUNK TWICE DAILY FOR 10-14 CONSECUTIVE DAYS. NOT TO BE USED ON FACE OR GROIN.    Eczema, unspecified type       cholecalciferol 1000 UNIT tablet    vitamin D    100 tablet    Take 2 tablets (2,000 Units) by mouth daily        hydrochlorothiazide 25 MG tablet    HYDRODIURIL    90 tablet    Take 1 tablet (25 mg) by mouth daily    Hypertension goal BP (blood pressure) < 140/90       montelukast 10 MG tablet    SINGULAIR    90 tablet    TAKE ONE TABLET BY MOUTH AT BEDTIME    Mild persistent asthma       ONE-A-DAY ESSENTIAL PO      Take  by mouth.        SUMAtriptan 25 MG tablet    IMITREX    9 tablet    Take 1-2 tablets (25-50 mg) by mouth at onset of headache for migraine May repeat in 2 hours. Max 8 tablets/24 hours.    Acute nonintractable headache, unspecified headache type       verapamil HCl  MG Cp24     30 capsule    Take 360 mg by mouth daily    Hypertension goal BP (blood pressure) < 140/90

## 2017-09-13 NOTE — NURSING NOTE
"Chief Complaint   Patient presents with     [facility]       Initial /88 (BP Location: Left arm, Patient Position: Chair, Cuff Size: Adult Regular)  Pulse 112  Temp 98.7  F (37.1  C) (Oral)  Ht 1.74 m (5' 8.5\")  Wt 79.8 kg (176 lb)  SpO2 97%  BMI 26.37 kg/m2 Estimated body mass index is 26.37 kg/(m^2) as calculated from the following:    Height as of this encounter: 1.74 m (5' 8.5\").    Weight as of this encounter: 79.8 kg (176 lb).  Medication Reconciliation: complete    Have you ever seen a rheumatologist NO Who NA When NA  Joint pain history  Onset: pt has had fevers, body aches, headaches, weakness and dizziness for the last 3 weeks  Involved joints: entire body  Pain scale:  5/10     Wakes the patient from sleep : Yes  Morning stiffness:Yes for 120 minutes  Meds used:none    Interim history  Since last visit:  1. Infections - No  2. New symptoms/medical problem - Yes/ pt has MS, has had problems with constipation  3. Any side effects from Rheum medications -NA  3. ER visits/Hospitalizations/surgeries - Yes  4. Last PCP visit: 1 week ago  Wt Readings from Last 4 Encounters:   09/13/17 79.8 kg (176 lb)   09/01/17 81.7 kg (180 lb 3.2 oz)   08/29/17 79.4 kg (175 lb)   08/28/17 80.7 kg (177 lb 14.4 oz)     BP Readings from Last 3 Encounters:   09/13/17 118/88   09/01/17 116/86   08/29/17 (!) 145/100       "

## 2017-09-18 ENCOUNTER — OFFICE VISIT (OUTPATIENT)
Dept: FAMILY MEDICINE | Facility: CLINIC | Age: 52
End: 2017-09-18
Payer: COMMERCIAL

## 2017-09-18 VITALS
BODY MASS INDEX: 26.96 KG/M2 | SYSTOLIC BLOOD PRESSURE: 126 MMHG | WEIGHT: 182 LBS | HEART RATE: 100 BPM | OXYGEN SATURATION: 97 % | HEIGHT: 69 IN | TEMPERATURE: 98.2 F | DIASTOLIC BLOOD PRESSURE: 84 MMHG

## 2017-09-18 DIAGNOSIS — G35 MS (MULTIPLE SCLEROSIS) (H): ICD-10-CM

## 2017-09-18 DIAGNOSIS — R50.9 FEVER, UNSPECIFIED: Primary | ICD-10-CM

## 2017-09-18 PROCEDURE — 99213 OFFICE O/P EST LOW 20 MIN: CPT | Performed by: PHYSICIAN ASSISTANT

## 2017-09-18 NOTE — PROGRESS NOTES
SUBJECTIVE:   Sterling Canales is a 52 year old male who presents to clinic today for the following health issues:    Patient presents to after extended fever of unknown etiology, body ache and headache  He notes over the weekend symptoms have continued to improve  No headaches since early morning Saturday  There have been no further fevers since Friday    Energy level is improved, still needing improvement  Eating well, tolerating fluids  BMs are normal  No abdominal pain  No chills or body aches    Last time he has taken medication was over the weekend    He has now stopped Aubagio; will wait until fevers/sxs are abated    Problem list and histories reviewed & adjusted, as indicated.  Additional history: as documented    Patient Active Problem List   Diagnosis     Mild persistent asthma     Allergic rhinitis     Carpal tunnel syndrome     Malaise and fatigue     MS (multiple sclerosis) (H)     Headache     CARDIOVASCULAR SCREENING; LDL GOAL LESS THAN 160     Hypertension goal BP (blood pressure) < 140/90     Fever, unknown origin     Past Surgical History:   Procedure Laterality Date     CARPAL TUNNEL RELEASE RT/LT  6/08    right     HERNIA REPAIR      bilateral inguinal; 2 separate occasions.     SURGICAL HISTORY OF -       lasik bilat       Social History   Substance Use Topics     Smoking status: Never Smoker     Smokeless tobacco: Never Used     Alcohol use 0.0 - 1.0 oz/week     0 - 2 Standard drinks or equivalent per week      Comment: rarely     Family History   Problem Relation Age of Onset     Hypertension Father      DIABETES Father      type 2     Lipids Mother      Breast Cancer Mother 67     Prostate Cancer Paternal Grandfather      dx'd late 70s     CANCER Brother 35     testicular CA             Reviewed and updated as needed this visit by clinical staff       Reviewed and updated as needed this visit by Provider         ROS:  Constitutional, HEENT, cardiovascular, pulmonary, gi and gu systems are  "negative, except as otherwise noted.      OBJECTIVE:   /84 (BP Location: Right arm, Cuff Size: Adult Regular)  Pulse 100  Temp 98.2  F (36.8  C) (Oral)  Ht 5' 8.75\" (1.746 m)  Wt 182 lb (82.6 kg)  SpO2 97%  BMI 27.07 kg/m2  Body mass index is 27.07 kg/(m^2).  GENERAL: healthy, alert and no distress  EYES: Eyes grossly normal to inspection, PERRL and conjunctivae and sclerae normal  HENT: ear canals and TM's normal, nose and mouth without ulcers or lesions  RESP: lungs clear to auscultation - no rales, rhonchi or wheezes  CV: tachycardia, normal S1 S2, no S3 or S4 and no murmur, click or rub  MS: no gross musculoskeletal defects noted, no edema  NEURO: Normal strength and tone, mentation intact and speech normal    Diagnostic Test Results:  none     ASSESSMENT/PLAN:   1. Fever, unspecified  2. MS (multiple sclerosis) (H)  Patient returns to clinic to discuss return to work. Since going off aubagio he has continued to improve, as have his symptoms/fevers. He still feels slightly fatigued and headaches continue, though these are not new. I think he'd benefit from one further week for rest and evaluation of symptoms and if none further he will be ok to return the 25th. If symptoms do return or completely improve, we'll see him here in clinic. Paperwork was completed for his return    Donnell Arroyo PA-C  [facility]  "

## 2017-09-18 NOTE — NURSING NOTE
"Chief Complaint   Patient presents with     Forms       Initial /84 (BP Location: Right arm, Cuff Size: Adult Regular)  Pulse 100  Temp 98.2  F (36.8  C) (Oral)  Ht 5' 8.75\" (1.746 m)  Wt 182 lb (82.6 kg)  SpO2 97%  BMI 27.07 kg/m2 Estimated body mass index is 27.07 kg/(m^2) as calculated from the following:    Height as of this encounter: 5' 8.75\" (1.746 m).    Weight as of this encounter: 182 lb (82.6 kg).  Medication Reconciliation: complete Latrell Marina CMA      "

## 2017-09-18 NOTE — MR AVS SNAPSHOT
After Visit Summary   9/18/2017    Sterling Canales    MRN: [id number]           Patient Information     Date Of Birth          1965        Visit Information        Provider Department      9/18/2017 3:00 PM Donnell Arroyo PA-C [facility]        Today's Diagnoses     Fever, unspecified    -  1    MS (multiple sclerosis) (H)           Follow-ups after your visit        Your next 10 appointments already scheduled     Sep 27, 2017 11:30 AM CDT   Return Visit with Manish De La Torre MD   [facility]    [address] [address] [phone]              Who to contact     If you have questions or need follow up information about today's clinic visit or your schedule please contact [facility] directly at [phone].  Normal or non-critical lab and imaging results will be communicated to you by MyChart, letter or phone within 4 business days after the clinic has received the results. If you do not hear from us within 7 days, please contact the clinic through [employer] or phone. If you have a critical or abnormal lab result, we will notify you by phone as soon as possible.  Submit refill requests through [employer] or call your pharmacy and they will forward the refill request to us. Please allow 3 business days for your refill to be completed.          Additional Information About Your Visit        MyChart Information     [employer] gives you secure access to your electronic health record. If you see a primary care provider, you can also send messages to your care team and make appointments. If you have questions, please call your primary care clinic.  If you do not have a primary care provider, please call [phone] and they will assist you.        Care EveryWhere ID     This is your Care EveryWhere ID. This could be used by other organizations to access your [place]  "medical records  [id number]        Your Vitals Were     Pulse Temperature Height Pulse Oximetry BMI (Body Mass Index)       100 98.2  F (36.8  C) (Oral) 5' 8.75\" (1.746 m) 97% 27.07 kg/m2        Blood Pressure from Last 3 Encounters:   09/18/17 126/84   09/13/17 118/88   09/01/17 116/86    Weight from Last 3 Encounters:   09/18/17 182 lb (82.6 kg)   09/13/17 176 lb (79.8 kg)   09/01/17 180 lb 3.2 oz (81.7 kg)              Today, you had the following     No orders found for display       Primary Care Provider Office Phone # Fax #    Donnell Arroyo PA-C [phone] [address] [facility] [address]        Equal Access to Services     [facility]: Hadii darlene crisostomo hadasho Sotamra, waaxda luqadaha, qaybta kaalmada adeegyada, reina darden . So [facility] [phone].    ATENCIÓN: Si habla español, tiene a edmondson disposición servicios gratuitos de asistencia lingüística. Llame al [phone].    We comply with applicable federal civil rights laws and [place] laws. We do not discriminate on the basis of race, color, national origin, age, disability sex, sexual orientation or gender identity.            Thank you!     Thank you for choosing [facility]  for your care. Our goal is always to provide you with excellent care. Hearing back from our patients is one way we can continue to improve our services. Please take a few minutes to complete the written survey that you may receive in the mail after your visit with us. Thank you!             Your Updated Medication List - Protect others around you: Learn how to safely use, store and throw away your medicines at www.disposemymeds.org.          This list is accurate as of: 9/18/17  3:50 PM.  Always use your most recent med list.                   Brand Name Dispense Instructions for use Diagnosis    albuterol 108 (90 BASE) MCG/ACT Inhaler    PROVENTIL HFA    3 Inhaler    Inhale 1-2 puffs into the lungs every " 6 hours as needed for shortness of breath / dyspnea    Mild persistent asthma       Ascorbic Acid 1000 MG Tbcr      Take  by mouth.        AUBAGIO 14 MG tablet   Generic drug:  teriflunomide      Take 14 mg by mouth daily Only available through select specialty pharmacies        betamethasone dipropionate 0.05 % cream    DIPROSONE    45 g    APPLY TO AFFECTED AREAS ON ARMS, LEGS OR TRUNK TWICE DAILY FOR 10-14 CONSECUTIVE DAYS. NOT TO BE USED ON FACE OR GROIN.    Eczema, unspecified type       cholecalciferol 1000 UNIT tablet    vitamin D    100 tablet    Take 2 tablets (2,000 Units) by mouth daily        hydrochlorothiazide 25 MG tablet    HYDRODIURIL    90 tablet    Take 1 tablet (25 mg) by mouth daily    Hypertension goal BP (blood pressure) < 140/90       montelukast 10 MG tablet    SINGULAIR    90 tablet    TAKE ONE TABLET BY MOUTH AT BEDTIME    Mild persistent asthma       ONE-A-DAY ESSENTIAL PO      Take  by mouth.        SUMAtriptan 25 MG tablet    IMITREX    9 tablet    Take 1-2 tablets (25-50 mg) by mouth at onset of headache for migraine May repeat in 2 hours. Max 8 tablets/24 hours.    Acute nonintractable headache, unspecified headache type       verapamil HCl  MG Cp24     30 capsule    Take 360 mg by mouth daily    Hypertension goal BP (blood pressure) < 140/90

## 2017-12-21 ENCOUNTER — OFFICE VISIT (OUTPATIENT)
Dept: FAMILY MEDICINE | Facility: CLINIC | Age: 52
End: 2017-12-21
Payer: COMMERCIAL

## 2017-12-21 VITALS
BODY MASS INDEX: 27.99 KG/M2 | DIASTOLIC BLOOD PRESSURE: 80 MMHG | HEART RATE: 83 BPM | WEIGHT: 189 LBS | OXYGEN SATURATION: 96 % | TEMPERATURE: 97.8 F | SYSTOLIC BLOOD PRESSURE: 122 MMHG | HEIGHT: 69 IN

## 2017-12-21 DIAGNOSIS — I10 HYPERTENSION GOAL BP (BLOOD PRESSURE) < 140/90: ICD-10-CM

## 2017-12-21 DIAGNOSIS — Z00.00 ENCOUNTER FOR ROUTINE ADULT HEALTH EXAMINATION WITHOUT ABNORMAL FINDINGS: Primary | ICD-10-CM

## 2017-12-21 DIAGNOSIS — J45.30 MILD PERSISTENT ASTHMA: ICD-10-CM

## 2017-12-21 DIAGNOSIS — Z12.5 SPECIAL SCREENING FOR MALIGNANT NEOPLASM OF PROSTATE: ICD-10-CM

## 2017-12-21 DIAGNOSIS — J45.30 MILD PERSISTENT ASTHMA WITHOUT COMPLICATION: ICD-10-CM

## 2017-12-21 PROCEDURE — 99396 PREV VISIT EST AGE 40-64: CPT | Performed by: PHYSICIAN ASSISTANT

## 2017-12-21 RX ORDER — ALBUTEROL SULFATE 90 UG/1
1-2 AEROSOL, METERED RESPIRATORY (INHALATION) EVERY 6 HOURS PRN
Qty: 3 INHALER | Refills: 1 | Status: SHIPPED | OUTPATIENT
Start: 2017-12-21 | End: 2018-12-20

## 2017-12-21 RX ORDER — LISINOPRIL/HYDROCHLOROTHIAZIDE 10-12.5 MG
1 TABLET ORAL DAILY
Qty: 30 TABLET | Refills: 1 | Status: SHIPPED | OUTPATIENT
Start: 2017-12-21 | End: 2018-02-16

## 2017-12-21 RX ORDER — MONTELUKAST SODIUM 10 MG/1
TABLET ORAL
Qty: 90 TABLET | Refills: 3 | Status: SHIPPED | OUTPATIENT
Start: 2017-12-21 | End: 2019-01-03

## 2017-12-21 NOTE — PROGRESS NOTES
SUBJECTIVE:   CC: Sterling Canales is an 52 year old male who presents for preventative health visit.     Physical   Annual:     Getting at least 3 servings of Calcium per day::  Yes    Bi-annual eye exam::  NO    Dental care twice a year::  Yes    Sleep apnea or symptoms of sleep apnea::  None    Diet::  Low salt    Frequency of exercise::  None    Taking medications regularly::  Yes    Medication side effects::  None    Additional concerns today::  YES              Patient presents for annual physical  He is active at work, but without other physical activity  He monitors salt, eats a lot of salads, lean cuisines for lunch  Is wondering if he should stay on his BP meds or change them.  He is getting occasionally elevated readings but denies symptoms      Today's PHQ-2 Score: PHQ-2 ( 1999 [employer]) 12/21/2017   Q1: Little interest or pleasure in doing things 0   Q2: Feeling down, depressed or hopeless 0   PHQ-2 Score 0   Q1: Little interest or pleasure in doing things Not at all   Q2: Feeling down, depressed or hopeless Not at all   PHQ-2 Score 0       Abuse: Current or Past(Physical, Sexual or Emotional)- No  Do you feel safe in your environment - Yes    Social History   Substance Use Topics     Smoking status: Never Smoker     Smokeless tobacco: Never Used     Alcohol use 0.0 - 1.0 oz/week     0 - 2 Standard drinks or equivalent per week      Comment: rarely     Alcohol Use 12/21/2017   If you drink alcohol, do you typically have greater than 3 drinks per day OR greater than 7 drinks per week?   No       Last PSA:   PSA   Date Value Ref Range Status   12/15/2015 1.10 0 - 4 ug/L Final       Reviewed orders with patient. Reviewed health maintenance and updated orders accordingly - Yes  Labs reviewed in EPIC    Reviewed and updated as needed this visit by clinical staff         Reviewed and updated as needed this visit by Provider            Review of Systems  C: NEGATIVE for fever, chills, change in weight  I:  "NEGATIVE for worrisome rashes, moles or lesions  E: NEGATIVE for vision changes or irritation  ENT: NEGATIVE for ear, mouth and throat problems  R: NEGATIVE for significant cough or SOB  CV: NEGATIVE for chest pain, palpitations or peripheral edema  GI: NEGATIVE for nausea, abdominal pain, heartburn, or change in bowel habits   male: negative for dysuria, hematuria, decreased urinary stream, erectile dysfunction, urethral discharge  M: NEGATIVE for significant arthralgias or myalgia  N: NEGATIVE for weakness, dizziness or paresthesias  P: NEGATIVE for changes in mood or affect    OBJECTIVE:   /80 (BP Location: Right arm, Cuff Size: Adult Large)  Pulse 83  Temp 97.8  F (36.6  C) (Oral)  Ht 5' 8.75\" (1.746 m)  Wt 189 lb (85.7 kg)  SpO2 96%  BMI 28.11 kg/m2    Physical Exam  GENERAL: healthy, alert and no distress  EYES: Eyes grossly normal to inspection, PERRL and conjunctivae and sclerae normal  HENT: ear canals and TM's normal, nose and mouth without ulcers or lesions  NECK: no adenopathy, no asymmetry, masses, or scars and thyroid normal to palpation  RESP: lungs clear to auscultation - no rales, rhonchi or wheezes  CV: regular rate and rhythm, normal S1 S2, no S3 or S4, no murmur, click or rub, no peripheral edema and peripheral pulses strong  ABDOMEN: soft, nontender, no hepatosplenomegaly, no masses and bowel sounds normal   (male): normal male genitalia without lesions or urethral discharge, no hernia  MS: no gross musculoskeletal defects noted, no edema  SKIN: no suspicious lesions or rashes  PSYCH: mentation appears normal, affect normal/bright    ASSESSMENT/PLAN:   1. Encounter for routine adult health examination without abnormal findings  51yo male in stable health. Colon cancer screening set up for January. Updating prostate screening. He needs to get leaner. We have reviewed the strategies for this. Exercise must be increased. Portions monitored.  - Lipid panel reflex to direct LDL " "Fasting; Future    2. Hypertension goal BP (blood pressure) < 140/90  His BP is trending a bit high and with the new guidelines, and his intermittent control we'll change things up. Start combo ace/diurectic. We'll need to update bmp over the next 3 months and i'd like him to recheck his bp at the pharmacy a few times after staring.   - Basic metabolic panel; Future  - lisinopril-hydrochlorothiazide (PRINZIDE/ZESTORETIC) 10-12.5 MG per tablet; Take 1 tablet by mouth daily  Dispense: 30 tablet; Refill: 1    3. Mild persistent asthma without complication  Refilling. Well controlled.  - montelukast (SINGULAIR) 10 MG tablet; TAKE ONE TABLET BY MOUTH AT BEDTIME  Dispense: 90 tablet; Refill: 3  - albuterol (PROVENTIL HFA) 108 (90 BASE) MCG/ACT Inhaler; Inhale 1-2 puffs into the lungs every 6 hours as needed for shortness of breath / dyspnea  Dispense: 3 Inhaler; Refill: 1    4. Special screening for malignant neoplasm of prostate  Asymptomatic. Family hx.   - Prostate spec antigen screen; Future    COUNSELING:   Reviewed preventive health counseling, as reflected in patient instructions       Regular exercise       Healthy diet/nutrition       Colon cancer screening       Prostate cancer screening           reports that he has never smoked. He has never used smokeless tobacco.      Estimated body mass index is 27.07 kg/(m^2) as calculated from the following:    Height as of 9/18/17: 5' 8.75\" (1.746 m).    Weight as of 9/18/17: 182 lb (82.6 kg).   Weight management plan: Discussed healthy diet and exercise guidelines and patient will follow up in 12 months in clinic to re-evaluate.    Counseling Resources:  ATP IV Guidelines  Pooled Cohorts Equation Calculator  FRAX Risk Assessment  ICSI Preventive Guidelines  Dietary Guidelines for Americans, 2010  USDA's MyPlate  ASA Prophylaxis  Lung CA Screening    Donnell Arroyo PA-C  [facility] [place]  Answers for HPI/ROS submitted by the patient on 12/21/2017   PHQ-2 " Score: 0

## 2017-12-21 NOTE — MR AVS SNAPSHOT
After Visit Summary   12/21/2017    Sterling Canales    MRN: [id number]           Patient Information     Date Of Birth          1965        Visit Information        Provider Department      12/21/2017 4:40 PM Donnell Arroyo PA-C [facility]        Today's Diagnoses     Encounter for routine adult health examination without abnormal findings    -  1    Hypertension goal BP (blood pressure) < 140/90        Special screening for malignant neoplasm of prostate        Mild persistent asthma without complication          Care Instructions      Preventive Health Recommendations  Male Ages 50 - 64    Yearly exam:             See your health care provider every year in order to  o   Review health changes.   o   Discuss preventive care.    o   Review your medicines if your doctor has prescribed any.     Have a cholesterol test every 5 years, or more frequently if you are at risk for high cholesterol/heart disease.     Have a diabetes test (fasting glucose) every three years. If you are at risk for diabetes, you should have this test more often.     Have a colonoscopy at age 50, or have a yearly FIT test (stool test). These exams will check for colon cancer.      Talk with your health care provider about whether or not a prostate cancer screening test (PSA) is right for you.    You should be tested each year for STDs (sexually transmitted diseases), if you re at risk.     Shots: Get a flu shot each year. Get a tetanus shot every 10 years.     Nutrition:    Eat at least 5 servings of fruits and vegetables daily.     Eat whole-grain bread, whole-wheat pasta and brown rice instead of white grains and rice.     Talk to your provider about Calcium and Vitamin D.     Lifestyle    Exercise for at least 150 minutes a week (30 minutes a day, 5 days a week). This will help you control your weight and prevent disease.     Limit alcohol to one drink per day.     No smoking.     Wear sunscreen to  "prevent skin cancer.     See your dentist every six months for an exam and cleaning.     See your eye doctor every 1 to 2 years.            Follow-ups after your visit        Future tests that were ordered for you today     Open Future Orders        Priority Expected Expires Ordered    Lipid panel reflex to direct LDL Fasting Routine  12/21/2018 12/21/2017    Basic metabolic panel Routine  12/21/2018 12/21/2017    Prostate spec antigen screen Routine  12/21/2018 12/21/2017            Who to contact     If you have questions or need follow up information about today's clinic visit or your schedule please contact [facility] directly at [phone].  Normal or non-critical lab and imaging results will be communicated to you by MyChart, letter or phone within 4 business days after the clinic has received the results. If you do not hear from us within 7 days, please contact the clinic through [employer] or phone. If you have a critical or abnormal lab result, we will notify you by phone as soon as possible.  Submit refill requests through [employer] or call your pharmacy and they will forward the refill request to us. Please allow 3 business days for your refill to be completed.          Additional Information About Your Visit        [employer] Information     [employer] gives you secure access to your electronic health record. If you see a primary care provider, you can also send messages to your care team and make appointments. If you have questions, please call your primary care clinic.  If you do not have a primary care provider, please call [phone] and they will assist you.        Care EveryWhere ID     This is your Care EveryWhere ID. This could be used by other organizations to access your [place] medical records  [id number]        Your Vitals Were     Pulse Temperature Height Pulse Oximetry BMI (Body Mass Index)       83 97.8  F (36.6  C) (Oral) 5' 8.75\" (1.746 m) 96% 28.11 kg/m2        Blood " Pressure from Last 3 Encounters:   12/21/17 122/80   09/18/17 126/84   09/13/17 118/88    Weight from Last 3 Encounters:   12/21/17 189 lb (85.7 kg)   09/18/17 182 lb (82.6 kg)   09/13/17 176 lb (79.8 kg)                 Today's Medication Changes          These changes are accurate as of: 12/21/17  5:35 PM.  If you have any questions, ask your nurse or doctor.               Start taking these medicines.        Dose/Directions    lisinopril-hydrochlorothiazide 10-12.5 MG per tablet   Commonly known as:  PRINZIDE/ZESTORETIC   Used for:  Hypertension goal BP (blood pressure) < 140/90   Started by:  Donnell Arroyo PA-C        Dose:  1 tablet   Take 1 tablet by mouth daily   Quantity:  30 tablet   Refills:  1         These medicines have changed or have updated prescriptions.        Dose/Directions    montelukast 10 MG tablet   Commonly known as:  SINGULAIR   This may have changed:  See the new instructions.   Used for:  Mild persistent asthma without complication   Changed by:  Donnell Arroyo PA-C        TAKE ONE TABLET BY MOUTH AT BEDTIME   Quantity:  90 tablet   Refills:  3            Where to get your medicines      These medications were sent to [facility] [address] [facility] [address] [facility] [address] [place] [address] & [facility]  [address] [facility] [address]     Phone:  [phone]     albuterol 108 (90 BASE) MCG/ACT Inhaler    lisinopril-hydrochlorothiazide 10-12.5 MG per tablet    montelukast 10 MG tablet                Primary Care Provider Office Phone # Fax #    Donnell Arroyo PA-C [phone] [address] [address] [facility] [address]        Equal Access to Services     AUBREY GILMAN AH: Delvin Martins, waeugene lubarbie, qabobbi kaalmada damien, reina pires. So [facility] [phone].    ATENCIÓN: Si habla español, tiene a edmondson disposición servicios gratuitos de asistencia lingüística. Llame al  [phone].    We comply with applicable federal civil rights laws and [place] laws. We do not discriminate on the basis of race, color, national origin, age, disability, sex, sexual orientation, or gender identity.            Thank you!     Thank you for choosing [facility] [place]  for your care. Our goal is always to provide you with excellent care. Hearing back from our patients is one way we can continue to improve our services. Please take a few minutes to complete the written survey that you may receive in the mail after your visit with us. Thank you!             Your Updated Medication List - Protect others around you: Learn how to safely use, store and throw away your medicines at www.disposemymeds.org.          This list is accurate as of: 12/21/17  5:35 PM.  Always use your most recent med list.                   Brand Name Dispense Instructions for use Diagnosis    albuterol 108 (90 BASE) MCG/ACT Inhaler    PROVENTIL HFA    3 Inhaler    Inhale 1-2 puffs into the lungs every 6 hours as needed for shortness of breath / dyspnea    Mild persistent asthma without complication       Ascorbic Acid 1000 MG Tbcr      Take  by mouth.        AUBAGIO 14 MG tablet   Generic drug:  teriflunomide      Take 14 mg by mouth daily Only available through select specialty pharmacies        betamethasone dipropionate 0.05 % cream    DIPROSONE    45 g    APPLY TO AFFECTED AREAS ON ARMS, LEGS OR TRUNK TWICE DAILY FOR 10-14 CONSECUTIVE DAYS. NOT TO BE USED ON FACE OR GROIN.    Eczema, unspecified type       cholecalciferol 1000 UNIT tablet    vitamin D3    100 tablet    Take 2 tablets (2,000 Units) by mouth daily        hydrochlorothiazide 25 MG tablet    HYDRODIURIL    90 tablet    Take 1 tablet (25 mg) by mouth daily    Hypertension goal BP (blood pressure) < 140/90       lisinopril-hydrochlorothiazide 10-12.5 MG per tablet    PRINZIDE/ZESTORETIC    30 tablet    Take 1 tablet by mouth daily    Hypertension goal BP  (blood pressure) < 140/90       montelukast 10 MG tablet    SINGULAIR    90 tablet    TAKE ONE TABLET BY MOUTH AT BEDTIME    Mild persistent asthma without complication       ONE-A-DAY ESSENTIAL PO      Take  by mouth.        SUMAtriptan 25 MG tablet    IMITREX    9 tablet    Take 1-2 tablets (25-50 mg) by mouth at onset of headache for migraine May repeat in 2 hours. Max 8 tablets/24 hours.    Acute nonintractable headache, unspecified headache type       verapamil HCl  MG Cp24     30 capsule    Take 360 mg by mouth daily    Hypertension goal BP (blood pressure) < 140/90

## 2017-12-21 NOTE — NURSING NOTE
"Chief Complaint   Patient presents with     Physical       Initial /80 (BP Location: Right arm, Cuff Size: Adult Large)  Pulse 83  Temp 97.8  F (36.6  C) (Oral)  Ht 5' 8.75\" (1.746 m)  Wt 189 lb (85.7 kg)  SpO2 96%  BMI 28.11 kg/m2 Estimated body mass index is 28.11 kg/(m^2) as calculated from the following:    Height as of this encounter: 5' 8.75\" (1.746 m).    Weight as of this encounter: 189 lb (85.7 kg).  Medication Reconciliation: complete   Latrell Marina CMA      "

## 2017-12-22 NOTE — TELEPHONE ENCOUNTER
DUPLICATE.     Requested Prescriptions   Pending Prescriptions Disp Refills     montelukast (SINGULAIR) 10 MG tablet [Pharmacy Med Name: MONTELUKAST SODIUM 10MG TABS]    Last Written Prescription Date:  12/21/2017  Last Fill Quantity: 90,  # refills: 3   Last Office Visit with [facility], [facility] or [facility] prescribing provider:  12/21/2017   Future Office Visit:      90 tablet 1     Sig: TAKE ONE TABLET BY MOUTH AT BEDTIME    Leukotriene Inhibitors Protocol Passed    12/21/2017  6:38 PM       Passed - Patient is age 12 or older    If patient is under 16, ok to refill using age based dosing.          Passed - Asthma control test 20 or greater in past 6 months    Please review ACT score.          Passed - Recent (6 mo) or future visit with authorizing provider's specialty    Patient had office visit in the last 6 months or has a visit in the next 30 days with authorizing provider.  See chart review.

## 2017-12-27 RX ORDER — MONTELUKAST SODIUM 10 MG/1
TABLET ORAL
Qty: 90 TABLET | Refills: 3 | Status: SHIPPED | OUTPATIENT
Start: 2017-12-27 | End: 2018-12-20

## 2017-12-27 NOTE — TELEPHONE ENCOUNTER
This was sent to a local pharmacy, this request is from a mail order.  Sent with refills.   Cheryl Barrios, RN  Triage Nurse

## 2017-12-29 DIAGNOSIS — Z12.5 SPECIAL SCREENING FOR MALIGNANT NEOPLASM OF PROSTATE: ICD-10-CM

## 2017-12-29 DIAGNOSIS — Z00.00 ENCOUNTER FOR ROUTINE ADULT HEALTH EXAMINATION WITHOUT ABNORMAL FINDINGS: ICD-10-CM

## 2017-12-29 DIAGNOSIS — I10 HYPERTENSION GOAL BP (BLOOD PRESSURE) < 140/90: ICD-10-CM

## 2017-12-29 PROCEDURE — 80048 BASIC METABOLIC PNL TOTAL CA: CPT | Performed by: PHYSICIAN ASSISTANT

## 2017-12-29 PROCEDURE — 80061 LIPID PANEL: CPT | Performed by: PHYSICIAN ASSISTANT

## 2017-12-29 PROCEDURE — G0103 PSA SCREENING: HCPCS | Performed by: PHYSICIAN ASSISTANT

## 2017-12-29 PROCEDURE — 36415 COLL VENOUS BLD VENIPUNCTURE: CPT | Performed by: PHYSICIAN ASSISTANT

## 2017-12-30 LAB
ANION GAP SERPL CALCULATED.3IONS-SCNC: 7 MMOL/L (ref 3–14)
BUN SERPL-MCNC: 16 MG/DL (ref 7–30)
CALCIUM SERPL-MCNC: 9 MG/DL (ref 8.5–10.1)
CHLORIDE SERPL-SCNC: 103 MMOL/L (ref 94–109)
CHOLEST SERPL-MCNC: 217 MG/DL
CO2 SERPL-SCNC: 30 MMOL/L (ref 20–32)
CREAT SERPL-MCNC: 0.89 MG/DL (ref 0.66–1.25)
GFR SERPL CREATININE-BSD FRML MDRD: >90 ML/MIN/1.7M2
GLUCOSE SERPL-MCNC: 97 MG/DL (ref 70–99)
HDLC SERPL-MCNC: 40 MG/DL
LDLC SERPL CALC-MCNC: 146 MG/DL
NONHDLC SERPL-MCNC: 177 MG/DL
POTASSIUM SERPL-SCNC: 4.3 MMOL/L (ref 3.4–5.3)
PSA SERPL-ACNC: 1.08 UG/L (ref 0–4)
SODIUM SERPL-SCNC: 140 MMOL/L (ref 133–144)
TRIGL SERPL-MCNC: 157 MG/DL

## 2018-01-12 ENCOUNTER — TELEPHONE (OUTPATIENT)
Dept: LAB | Facility: CLINIC | Age: 53
End: 2018-01-12

## 2018-01-16 ENCOUNTER — TELEPHONE (OUTPATIENT)
Dept: LAB | Facility: CLINIC | Age: 53
End: 2018-01-16

## 2018-01-16 NOTE — TELEPHONE ENCOUNTER
As I mentioned on 1/12/18:  I asked him to get labs only if his symptoms did not improve. Probably his symptoms resolved

## 2018-01-17 DIAGNOSIS — I10 HYPERTENSION GOAL BP (BLOOD PRESSURE) < 140/90: ICD-10-CM

## 2018-01-18 RX ORDER — LISINOPRIL/HYDROCHLOROTHIAZIDE 10-12.5 MG
TABLET ORAL
Qty: 90 TABLET | Refills: 1
Start: 2018-01-18

## 2018-01-18 NOTE — TELEPHONE ENCOUNTER
"Requested Prescriptions   Pending Prescriptions Disp Refills     lisinopril-hydrochlorothiazide (PRINZIDE/ZESTORETIC) 10-12.5 MG per tablet [Pharmacy Med Name: LISINOPRIL-HCTZ 10/12.5MG TABLETS]  Last Written Prescription Date:  12/21/17  Last Fill Quantity: 30,  # refills: 1   [place] Office Visit with [facility] or [facility] prescribing provider:  12/21/2017     Future Office Visit:      90 tablet 1     Sig: TAKE 1 TABLET BY MOUTH DAILY    Diuretics (Including Combos) Protocol Passed    1/17/2018  9:30 PM       Passed - Blood pressure under 140/90    BP Readings from Last 3 Encounters:   12/21/17 122/80   09/18/17 126/84   09/13/17 118/88                Passed - Recent or future visit with authorizing provider's specialty    Patient had office visit in the last year or has a visit in the next 30 days with authorizing provider.  See \"Patient Info\" tab in inbasket, or \"Choose Columns\" in Meds & Orders section of the refill encounter.            Passed - Patient is age 18 or older       Passed - Normal serum creatinine on file in past 12 months    Recent Labs   Lab Test  12/29/17   1018   CR  0.89             Passed - Normal serum potassium on file in past 12 months    Recent Labs   Lab Test  12/29/17   1018   POTASSIUM  4.3                   Passed - Normal serum sodium on file in past 12 months    Recent Labs   Lab Test  12/29/17   1018   NA  140                "

## 2018-01-22 ENCOUNTER — TRANSFERRED RECORDS (OUTPATIENT)
Dept: HEALTH INFORMATION MANAGEMENT | Facility: CLINIC | Age: 53
End: 2018-01-22

## 2018-01-28 DIAGNOSIS — L30.9 ECZEMA, UNSPECIFIED TYPE: ICD-10-CM

## 2018-01-29 NOTE — TELEPHONE ENCOUNTER
Requested Prescriptions   Pending Prescriptions Disp Refills     betamethasone dipropionate (DIPROSONE) 0.05 % cream [Pharmacy Med Name: BETAMETHASONE DIP 0.05% CRM 45GM] 45 g 0     Sig: APPLY TO AFFECTED AREAS ON ARMS, LEGS OR TRUNK TWICE DAILY FOR 10-14 CONSECUTIVE DAYS. NOT TO BE USED ON FACE OR GROIN.    There is no refill protocol information for this order

## 2018-01-29 NOTE — TELEPHONE ENCOUNTER
betamethasone dipropionate (DIPROSONE) 0.05 % cream      Last Written Prescription Date:  1/17/2017  Last Fill Quantity: 45g,   # refills: 1  Last Office Visit: 1/24/2017  Future Office visit:       Routing refill request to provider for review/approval because:  Drug not on the [facility], [facility] or [facility] refill protocol or controlled substance

## 2018-01-30 RX ORDER — BETAMETHASONE DIPROPIONATE 0.5 MG/G
CREAM TOPICAL
Qty: 45 G | Refills: 0 | Status: SHIPPED | OUTPATIENT
Start: 2018-01-30 | End: 2019-09-06

## 2018-02-01 NOTE — TELEPHONE ENCOUNTER
patient notified of refill and need for appointment prior to next refill. patient verbalized understanding.    Rosie HENAO RN  [facility]  [phone]  [facility]   [phone]

## 2018-02-16 ENCOUNTER — TELEPHONE (OUTPATIENT)
Dept: FAMILY MEDICINE | Facility: CLINIC | Age: 53
End: 2018-02-16

## 2018-02-16 DIAGNOSIS — I10 HYPERTENSION GOAL BP (BLOOD PRESSURE) < 140/90: ICD-10-CM

## 2018-02-16 RX ORDER — LISINOPRIL/HYDROCHLOROTHIAZIDE 10-12.5 MG
1 TABLET ORAL DAILY
Qty: 90 TABLET | Refills: 1 | Status: SHIPPED | OUTPATIENT
Start: 2018-02-16 | End: 2018-08-16

## 2018-02-16 NOTE — TELEPHONE ENCOUNTER
Refilling x 6 months. Needs labs sometime this spring - ideally in March but ok to wait thru end of this refill. Future lab order is placed.

## 2018-02-16 NOTE — TELEPHONE ENCOUNTER
Called patient to notify him this was sent.   Left message for him at contact number.     Cheryl Barrios, RN  Triage Nurse

## 2018-02-16 NOTE — TELEPHONE ENCOUNTER
Patient called and left voice mail message today with nurse  Asking for 90 days of the Prinizide be sent in. His Bp has not been  Checked here, but he has checked it 5 times in the past month,  And it has been 122/82, 120's/ 80's. Should he come in sometime  Soon and do the lab test? When should he come in to see you again?    Please advise/sign if ok.    Cheryl Barrios, SMILEY  Triage Nurse

## 2018-05-15 DIAGNOSIS — I10 HYPERTENSION GOAL BP (BLOOD PRESSURE) < 140/90: ICD-10-CM

## 2018-05-15 PROCEDURE — 80048 BASIC METABOLIC PNL TOTAL CA: CPT | Performed by: PHYSICIAN ASSISTANT

## 2018-05-15 PROCEDURE — 36415 COLL VENOUS BLD VENIPUNCTURE: CPT | Performed by: PHYSICIAN ASSISTANT

## 2018-05-17 LAB
ANION GAP SERPL CALCULATED.3IONS-SCNC: 8 MMOL/L (ref 3–14)
BUN SERPL-MCNC: 13 MG/DL (ref 7–30)
CALCIUM SERPL-MCNC: 9.1 MG/DL (ref 8.5–10.1)
CHLORIDE SERPL-SCNC: 103 MMOL/L (ref 94–109)
CO2 SERPL-SCNC: 27 MMOL/L (ref 20–32)
CREAT SERPL-MCNC: 0.94 MG/DL (ref 0.66–1.25)
GFR SERPL CREATININE-BSD FRML MDRD: 84 ML/MIN/1.7M2
GLUCOSE SERPL-MCNC: 110 MG/DL (ref 70–99)
POTASSIUM SERPL-SCNC: 4.6 MMOL/L (ref 3.4–5.3)
SODIUM SERPL-SCNC: 138 MMOL/L (ref 133–144)

## 2018-08-14 ENCOUNTER — TELEPHONE (OUTPATIENT)
Dept: FAMILY MEDICINE | Facility: CLINIC | Age: 53
End: 2018-08-14

## 2018-08-14 NOTE — TELEPHONE ENCOUNTER
Type of outreach:  Sent "[employer]" message.  Health Maintenance Due   Topic Date Due     HIV SCREEN (SYSTEM ASSIGNED)  02/18/1985     ASTHMA CONTROL TEST Q6 MOS  01/17/2018     ASTHMA ACTION PLAN Q1 YR  03/02/2018     Due for an asthma follow up and med check.   Angelica Jaramillo MA

## 2018-08-16 DIAGNOSIS — I10 HYPERTENSION GOAL BP (BLOOD PRESSURE) < 140/90: ICD-10-CM

## 2018-08-17 NOTE — TELEPHONE ENCOUNTER
"Requested Prescriptions   Pending Prescriptions Disp Refills     lisinopril-hydrochlorothiazide (PRINZIDE/ZESTORETIC) 10-12.5 MG per tablet [Pharmacy Med Name: LISINOPRIL-HCTZ 10/12.5MG TABLETS] 90 tablet 0    Last Written Prescription Date:  2/16/18  Last Fill Quantity: 90,  # refills: 0   Last office visit: 12/21/2017 with prescribing provider:  Cruz   Future Office Visit:   None scheduled.    Sig: TAKE 1 TABLET BY MOUTH DAILY    Diuretics (Including Combos) Protocol Passed    8/16/2018  9:52 PM       Passed - Blood pressure under 140/90 in past 12 months    BP Readings from Last 3 Encounters:   12/21/17 122/80   09/18/17 126/84   09/13/17 118/88                Passed - Recent (12 mo) or future (30 days) visit within the authorizing provider's specialty    Patient had office visit in the last 12 months or has a visit in the next 30 days with authorizing provider or within the authorizing provider's specialty.  See \"Patient Info\" tab in inbasket, or \"Choose Columns\" in Meds & Orders section of the refill encounter.           Passed - Patient is age 18 or older       Passed - Normal serum creatinine on file in past 12 months    Recent Labs   Lab Test  05/15/18   1800   CR  0.94             Passed - Normal serum potassium on file in past 12 months    Recent Labs   Lab Test  05/15/18   1800   POTASSIUM  4.6                   Passed - Normal serum sodium on file in past 12 months    Recent Labs   Lab Test  05/15/18   1800   NA  138                "

## 2018-08-20 RX ORDER — LISINOPRIL/HYDROCHLOROTHIAZIDE 10-12.5 MG
TABLET ORAL
Qty: 90 TABLET | Refills: 0 | Status: SHIPPED | OUTPATIENT
Start: 2018-08-20 | End: 2018-11-14

## 2018-11-14 DIAGNOSIS — I10 HYPERTENSION GOAL BP (BLOOD PRESSURE) < 140/90: ICD-10-CM

## 2018-11-15 RX ORDER — LISINOPRIL/HYDROCHLOROTHIAZIDE 10-12.5 MG
TABLET ORAL
Qty: 90 TABLET | Refills: 0 | Status: SHIPPED | OUTPATIENT
Start: 2018-11-15 | End: 2018-12-20

## 2018-11-15 NOTE — TELEPHONE ENCOUNTER
"Requested Prescriptions   Pending Prescriptions Disp Refills     lisinopril-hydrochlorothiazide (PRINZIDE/ZESTORETIC) 10-12.5 MG per tablet [Pharmacy Med Name: LISINOPRIL-HCTZ 10/12.5MG TABLETS]  Last Written Prescription Date:  8/20/18  Last Fill Quantity: 90,  # refills: 0   Last office visit: 12/21/2017 with prescribing provider:  Donnell Arroyo PA-C    Future Office Visit:   Next 5 appointments (look out 90 days)     Dec 20, 2018  4:20 PM CST   PHYSICAL with Donnell Arroyo PA-C   [facility]    [address] [place] [id number] [phone]                  90 tablet 0     Sig: TAKE 1 TABLET BY MOUTH DAILY    Diuretics (Including Combos) Protocol Passed    11/14/2018  9:56 PM       Passed - Blood pressure under 140/90 in past 12 months    BP Readings from Last 3 Encounters:   12/21/17 122/80   09/18/17 126/84   09/13/17 118/88                Passed - Recent (12 mo) or future (30 days) visit within the authorizing provider's specialty    Patient had office visit in the last 12 months or has a visit in the next 30 days with authorizing provider or within the authorizing provider's specialty.  See \"Patient Info\" tab in inbasket, or \"Choose Columns\" in Meds & Orders section of the refill encounter.             Passed - Patient is age 18 or older       Passed - Normal serum creatinine on file in past 12 months    Recent Labs   Lab Test  05/15/18   1800   CR  0.94             Passed - Normal serum potassium on file in past 12 months    Recent Labs   Lab Test  05/15/18   1800   POTASSIUM  4.6                   Passed - Normal serum sodium on file in past 12 months    Recent Labs   Lab Test  05/15/18   1800   NA  138                "

## 2018-11-16 NOTE — TELEPHONE ENCOUNTER
Prescription approved per [facility], [facility] or [facility] refill protocol.  Harini Cm RN - Triage  [facility]

## 2018-12-20 ENCOUNTER — TELEPHONE (OUTPATIENT)
Dept: FAMILY MEDICINE | Facility: CLINIC | Age: 53
End: 2018-12-20

## 2018-12-20 ENCOUNTER — OFFICE VISIT (OUTPATIENT)
Dept: FAMILY MEDICINE | Facility: CLINIC | Age: 53
End: 2018-12-20
Payer: COMMERCIAL

## 2018-12-20 VITALS
DIASTOLIC BLOOD PRESSURE: 78 MMHG | SYSTOLIC BLOOD PRESSURE: 124 MMHG | HEIGHT: 69 IN | RESPIRATION RATE: 14 BRPM | HEART RATE: 81 BPM | TEMPERATURE: 98.4 F | OXYGEN SATURATION: 97 % | WEIGHT: 176.7 LBS | BODY MASS INDEX: 26.17 KG/M2

## 2018-12-20 DIAGNOSIS — Z00.00 ENCOUNTER FOR ROUTINE ADULT HEALTH EXAMINATION WITHOUT ABNORMAL FINDINGS: Primary | ICD-10-CM

## 2018-12-20 DIAGNOSIS — I10 HYPERTENSION GOAL BP (BLOOD PRESSURE) < 140/90: ICD-10-CM

## 2018-12-20 DIAGNOSIS — G35 MS (MULTIPLE SCLEROSIS) (H): ICD-10-CM

## 2018-12-20 DIAGNOSIS — J45.30 MILD PERSISTENT ASTHMA WITHOUT COMPLICATION: ICD-10-CM

## 2018-12-20 PROCEDURE — 99396 PREV VISIT EST AGE 40-64: CPT | Performed by: PHYSICIAN ASSISTANT

## 2018-12-20 PROCEDURE — 99213 OFFICE O/P EST LOW 20 MIN: CPT | Mod: 25 | Performed by: PHYSICIAN ASSISTANT

## 2018-12-20 RX ORDER — ALBUTEROL SULFATE 90 UG/1
1-2 AEROSOL, METERED RESPIRATORY (INHALATION) EVERY 6 HOURS PRN
Qty: 1 INHALER | Refills: 1 | Status: SHIPPED | OUTPATIENT
Start: 2018-12-20 | End: 2018-12-31

## 2018-12-20 RX ORDER — GABAPENTIN 100 MG/1
100 CAPSULE ORAL DAILY
Qty: 30 CAPSULE | Refills: 0 | Status: SHIPPED | OUTPATIENT
Start: 2018-12-20 | End: 2018-12-31

## 2018-12-20 RX ORDER — MONTELUKAST SODIUM 10 MG/1
TABLET ORAL
Qty: 90 TABLET | Refills: 3 | Status: SHIPPED | OUTPATIENT
Start: 2018-12-20 | End: 2019-01-07

## 2018-12-20 RX ORDER — LISINOPRIL/HYDROCHLOROTHIAZIDE 10-12.5 MG
1 TABLET ORAL DAILY
Qty: 90 TABLET | Refills: 3 | Status: SHIPPED | OUTPATIENT
Start: 2018-12-20 | End: 2019-12-30

## 2018-12-20 ASSESSMENT — ENCOUNTER SYMPTOMS
EYE PAIN: 0
JOINT SWELLING: 0
FREQUENCY: 0
CONSTIPATION: 0
CHILLS: 0
DIARRHEA: 0
PARESTHESIAS: 0
HEADACHES: 0
NAUSEA: 0
FEVER: 0
MYALGIAS: 0
COUGH: 0
PALPITATIONS: 0
ARTHRALGIAS: 0
HEARTBURN: 0
SORE THROAT: 0
HEMATOCHEZIA: 0
NERVOUS/ANXIOUS: 0
HEMATURIA: 0
DYSURIA: 0
DIZZINESS: 1
ABDOMINAL PAIN: 0
SHORTNESS OF BREATH: 0
WEAKNESS: 0

## 2018-12-20 ASSESSMENT — MIFFLIN-ST. JEOR: SCORE: 1638.95

## 2018-12-20 NOTE — LETTER
My Asthma Action Plan  Name: Sterling Canales   YOB: 1965  Date: 12/20/2018   My doctor: Donnell Arroyo PA-C   My clinic: [facility]        My Control Medicine: { :745297}  My Rescue Medicine: { :661034}  {AAP include Oral Steroid:160886} My Asthma Severity: { :780719}  Avoid your asthma triggers: { :488749}        {Is patient a child or adult?:104435}       GREEN ZONE   Good Control    I feel good    No cough or wheeze    Can work, sleep and play without asthma symptoms       Take your asthma control medicine every day.     1. If exercise triggers your asthma, take your rescue medication    15 minutes before exercise or sports, and    During exercise if you have asthma symptoms  2. Spacer to use with inhaler: If you have a spacer, make sure to use it with your inhaler             YELLOW ZONE Getting Worse  I have ANY of these:    I do not feel good    Cough or wheeze    Chest feels tight    Wake up at night   1. Keep taking your Green Zone medications  2. Start taking your rescue medicine:    every 20 minutes for up to 1 hour. Then every 4 hours for 24-48 hours.  3. If you stay in the Yellow Zone for more than 12-24 hours, contact your doctor.  4. If you do not return to the Green Zone in 12-24 hours or you get worse, start taking your oral steroid medicine if prescribed by your provider.           RED ZONE Medical Alert - Get Help  I have ANY of these:    I feel awful    Medicine is not helping    Breathing getting harder    Trouble walking or talking    Nose opens wide to breathe       1. Take your rescue medicine NOW  2. If your provider has prescribed an oral steroid medicine, start taking it NOW  3. Call your doctor NOW  4. If you are still in the Red Zone after 20 minutes and you have not reached your doctor:    Take your rescue medicine again and    Call 911 or go to the emergency room right away    See your regular doctor within 2 weeks of an Emergency Room or Urgent  Care visit for follow-up treatment.          Annual Reminders:  Meet with Asthma Educator,  Flu Shot in the Fall, consider Pneumonia Vaccination for patients with asthma (aged 19 and older).    Pharmacy:    [facility]  [facility] - [place], [place] - [address] [facility] [address]  [facility] [address]  [place], [place] - [address] [address]  & [facility]                      Asthma Triggers  How To Control Things That Make Your Asthma Worse    Triggers are things that make your asthma worse.  Look at the list below to help you find your triggers and what you can do about them.  You can help prevent asthma flare-ups by staying away from your triggers.      Trigger                                                          What you can do   Cigarette Smoke  Tobacco smoke can make asthma worse. Do not allow smoking in your home, car or around you.  Be sure no one smokes at a child s day care or school.  If you smoke, ask your health care provider for ways to help you quit.  Ask family members to quit too.  Ask your health care provider for a referral to Quit Plan to help you quit smoking, or call 5-778-434-PLAN.     Colds, Flu, Bronchitis  These are common triggers of asthma. Wash your hands often.  Don t touch your eyes, nose or mouth.  Get a flu shot every year.     Dust Mites  These are tiny bugs that live in cloth or carpet. They are too small to see. Wash sheets and blankets in hot water every week.   Encase pillows and mattress in dust mite proof covers.  Avoid having carpet if you can. If you have carpet, vacuum weekly.   Use a dust mask and HEPA vacuum.   Pollen and Outdoor Mold  Some people are allergic to trees, grass, or weed pollen, or molds. Try to keep your windows closed.  Limit time out doors when pollen count is high.   Ask you health care provider about taking medicine during allergy season.     Animal Dander  Some people  are allergic to skin flakes, urine or saliva from pets with fur or feathers. Keep pets with fur or feathers out of your home.    If you can t keep the pet outdoors, then keep the pet out of your bedroom.  Keep the bedroom door closed.  Keep pets off cloth furniture and away from stuffed toys.     Mice, Rats, and Cockroaches  Some people are allergic to the waste from these pests.   Cover food and garbage.  Clean up spills and food crumbs.  Store grease in the refrigerator.   Keep food out of the bedroom.   Indoor Mold  This can be a trigger if your home has high moisture. Fix leaking faucets, pipes, or other sources of water.   Clean moldy surfaces.  Dehumidify basement if it is damp and smelly.   Smoke, Strong Odors, and Sprays  These can reduce air quality. Stay away from strong odors and sprays, such as perfume, powder, hair spray, paints, smoke incense, paint, cleaning products, candles and new carpet.   Exercise or Sports  Some people with asthma have this trigger. Be active!  Ask your doctor about taking medicine before sports or exercise to prevent symptoms.    Warm up for 5-10 minutes before and after sports or exercise.     Other Triggers of Asthma  Cold air:  Cover your nose and mouth with a scarf.  Sometimes laughing or crying can be a trigger.  Some medicines and food can trigger asthma.

## 2018-12-20 NOTE — PROGRESS NOTES
SUBJECTIVE:   CC: Sterling Canales is an 53 year old male who presents for preventative health visit.     Physical   Annual:     Getting at least 3 servings of Calcium per day:  Yes    Bi-annual eye exam:  NO    Dental care twice a year:  Yes    Sleep apnea or symptoms of sleep apnea:  Daytime drowsiness    Diet:  Low salt    Frequency of exercise:  None    Taking medications regularly:  Yes    Medication side effects:  None    Additional concerns today:  Yes    PHQ-2 Total Score: 0    -Patient presents for annual physical  -overall he had a good year, felt healthy  -he has gone down some weight; walking a new dog      Today's PHQ-2 Score:   PHQ-2 ( 1999 Pfizer) 12/20/2018   Q1: Little interest or pleasure in doing things 0   Q2: Feeling down, depressed or hopeless 0   PHQ-2 Score 0   Q1: Little interest or pleasure in doing things Not at all   Q2: Feeling down, depressed or hopeless Not at all   PHQ-2 Score 0       Abuse: Current or Past(Physical, Sexual or Emotional)- No  Do you feel safe in your environment? Yes    Social History     Tobacco Use     Smoking status: Never Smoker     Smokeless tobacco: Never Used   Substance Use Topics     Alcohol use: Yes     Alcohol/week: 0.0 - 1.0 oz     Comment: rarely     Alcohol Use 12/20/2018   If you drink alcohol do you typically have greater than 3 drinks per day OR greater than 7 drinks per week? No     Last PSA:   PSA   Date Value Ref Range Status   12/29/2017 1.08 0 - 4 ug/L Final     Comment:     Assay Method:  Chemiluminescence using Siemens Vista analyzer       Reviewed orders with patient. Reviewed health maintenance and updated orders accordingly - Yes  Labs reviewed in EPIC    Reviewed and updated as needed this visit by clinical staff         Reviewed and updated as needed this visit by Provider          Review of Systems   Constitutional: Negative for chills and fever.   HENT: Positive for congestion. Negative for ear pain, hearing loss and sore throat.   "  Eyes: Negative for pain and visual disturbance.   Respiratory: Negative for cough and shortness of breath.    Cardiovascular: Negative for chest pain, palpitations and peripheral edema.   Gastrointestinal: Negative for abdominal pain, constipation, diarrhea, heartburn, hematochezia and nausea.   Genitourinary: Negative for discharge, dysuria, frequency, genital sores, hematuria, impotence and urgency.   Musculoskeletal: Negative for arthralgias, joint swelling and myalgias.   Skin: Positive for rash.   Neurological: Positive for dizziness (rare; notes with MS symptoms). Negative for weakness, headaches and paresthesias.   Psychiatric/Behavioral: Negative for mood changes. The patient is not nervous/anxious.        OBJECTIVE:   /78   Pulse 81   Temp 98.4  F (36.9  C) (Tympanic)   Resp 14   Ht 1.74 m (5' 8.5\")   Wt 80.2 kg (176 lb 11.2 oz)   SpO2 97%   BMI 26.48 kg/m      Physical Exam  GENERAL: healthy, alert and no distress  EYES: Eyes grossly normal to inspection, PERRL and conjunctivae and sclerae normal  HENT: ear canals and TM's normal, nose and mouth without ulcers or lesions  NECK: no adenopathy, no asymmetry, masses, or scars and thyroid normal to palpation  RESP: lungs clear to auscultation - no rales, rhonchi or wheezes  CV: regular rate and rhythm, normal S1 S2, no S3 or S4, no murmur, click or rub, no peripheral edema and peripheral pulses strong  ABDOMEN: soft, nontender, no hepatosplenomegaly, no masses and bowel sounds normal  MS: no gross musculoskeletal defects noted, no edema  SKIN: no suspicious lesions or rashes  NEURO: Normal strength and tone, mentation intact and speech normal  PSYCH: mentation appears normal, affect normal/bright    Diagnostic Test Results:  Will return for fasting labs    ASSESSMENT/PLAN:   1. Encounter for routine adult health examination without abnormal findings  Reviewed recommended vaccines. Continue to work on weight. Follow-up one year.     2. " "Hypertension goal BP (blood pressure) < 140/90  - Basic metabolic panel; Future  - Lipid panel reflex to direct LDL Fasting; Future  - lisinopril-hydrochlorothiazide (PRINZIDE/ZESTORETIC) 10-12.5 MG tablet; Take 1 tablet by mouth daily  Dispense: 90 tablet; Refill: 3    3. Mild persistent asthma without complication  - montelukast (SINGULAIR) 10 MG tablet; TAKE ONE TABLET BY MOUTH AT BEDTIME  Dispense: 90 tablet; Refill: 3  - albuterol (PROVENTIL HFA) 108 (90 Base) MCG/ACT inhaler; Inhale 1-2 puffs into the lungs every 6 hours as needed for shortness of breath / dyspnea  Dispense: 1 Inhaler; Refill: 1    4. MS (multiple sclerosis) (H)  Following with outside neurology. Symptoms stable within the past year.   - gabapentin (NEURONTIN) 100 MG capsule; Take 1 capsule (100 mg) by mouth daily  Dispense: 30 capsule; Refill: 0    COUNSELING:   Reviewed preventive health counseling, as reflected in patient instructions    BP Readings from Last 1 Encounters:   12/21/17 122/80     Estimated body mass index is 28.11 kg/m  as calculated from the following:    Height as of 12/21/17: 1.746 m (5' 8.75\").    Weight as of 12/21/17: 85.7 kg (189 lb).      Weight management plan: Discussed healthy diet and exercise guidelines     reports that  has never smoked. he has never used smokeless tobacco.      Counseling Resources:  ATP IV Guidelines  Pooled Cohorts Equation Calculator  FRAX Risk Assessment  ICSI Preventive Guidelines  Dietary Guidelines for Americans, 2010  USDA's MyPlate  ASA Prophylaxis  Lung CA Screening    Donnell Arroyo PA-C  [facility]  "

## 2018-12-20 NOTE — TELEPHONE ENCOUNTER
Type of outreach:  None, patient has appt today (12/20/18)  Health Maintenance Due   Topic Date Due     HIV SCREEN (SYSTEM ASSIGNED)  02/18/1985     ZOSTER IMMUNIZATION (1 of 2) 02/18/2017     ASTHMA CONTROL TEST Q6 MOS  01/17/2018     ASTHMA ACTION PLAN Q1 YR  03/02/2018     PHQ-2 Q1 YR  12/21/2018     Asthma F/U at physical.  Angelica Jaramillo MA

## 2018-12-21 ASSESSMENT — ASTHMA QUESTIONNAIRES: ACT_TOTALSCORE: 23

## 2018-12-28 DIAGNOSIS — J45.30 MILD PERSISTENT ASTHMA WITHOUT COMPLICATION: ICD-10-CM

## 2018-12-28 DIAGNOSIS — G35 MS (MULTIPLE SCLEROSIS) (H): ICD-10-CM

## 2018-12-28 NOTE — TELEPHONE ENCOUNTER
Mail order pharmacy requesting a 90 day supply     Disp Refills Start End DARREN   albuterol (PROVENTIL HFA) 108 (90 Base) MCG/ACT inhaler 1 Inhaler 1 12/20/2018        Disp Refills Start End DARREN   gabapentin (NEURONTIN) 100 MG capsule 30 capsule 0 12/20/2018 1/19/2019

## 2018-12-31 RX ORDER — ALBUTEROL SULFATE 90 UG/1
1-2 AEROSOL, METERED RESPIRATORY (INHALATION) EVERY 6 HOURS PRN
Qty: 1 INHALER | Refills: 1 | Status: SHIPPED | OUTPATIENT
Start: 2018-12-31 | End: 2019-12-30

## 2018-12-31 RX ORDER — GABAPENTIN 100 MG/1
100 CAPSULE ORAL DAILY
Qty: 90 CAPSULE | Refills: 0 | Status: SHIPPED | OUTPATIENT
Start: 2018-12-31 | End: 2023-02-28

## 2018-12-31 NOTE — TELEPHONE ENCOUNTER
Gabapentin      Last Written Prescription Date:  12/20/2018  Last Fill Quantity: 30,   # refills: 0  Last Office Visit: 12/20/2018  Future Office visit:       Routing refill request to provider for review/approval because:  Drug not on the G, P or [facility] refill protocol or controlled substance    Harini LEÓN RN - Triage  [facility]

## 2019-01-03 DIAGNOSIS — J45.30 MILD PERSISTENT ASTHMA WITHOUT COMPLICATION: ICD-10-CM

## 2019-01-03 NOTE — TELEPHONE ENCOUNTER
"Requested Prescriptions   Pending Prescriptions Disp Refills     montelukast (SINGULAIR) 10 MG tablet [Pharmacy Med Name: MONTELUKAST 10MG TABLETS]  Last Written Prescription Date:  12/20/18  Last Fill Quantity: 90,  # refills: 3   Last office visit: 12/20/2018 with prescribing provider:  Donnell Arroyo PA-C    Future Office Visit:     90 tablet 0     Sig: TAKE 1 TABLET BY MOUTH AT BEDTIME    Leukotriene Inhibitors Protocol Passed - 1/3/2019  3:17 AM       Passed - Patient is age 12 or older    If patient is under 16, ok to refill using age based dosing.          Passed - Asthma control assessment score within normal limits in last 6 months    Please review ACT score.          Passed - Recent (6 mo) or future (30 days) visit within the authorizing provider's specialty    Patient had office visit in the last 6 months or has a visit in the next 30 days with authorizing provider or within the authorizing provider's specialty.  See \"Patient Info\" tab in inbasket, or \"Choose Columns\" in Meds & Orders section of the refill encounter.              "

## 2019-01-07 RX ORDER — MONTELUKAST SODIUM 10 MG/1
TABLET ORAL
Qty: 90 TABLET | Refills: 2 | Status: SHIPPED | OUTPATIENT
Start: 2019-01-07 | End: 2019-10-28

## 2019-01-07 NOTE — TELEPHONE ENCOUNTER
Mail order pharmacy calling to have 3 inhalers with no refills dispensed.   Provided verbal okay.  Harini EDMOND, Triage RN

## 2019-01-07 NOTE — TELEPHONE ENCOUNTER
Prescription approved per [facility] [place].    Tanesha Longoria RN -- [facility]

## 2019-01-09 ENCOUNTER — TELEPHONE (OUTPATIENT)
Dept: FAMILY MEDICINE | Facility: CLINIC | Age: 54
End: 2019-01-09

## 2019-01-10 DIAGNOSIS — I10 HYPERTENSION GOAL BP (BLOOD PRESSURE) < 140/90: ICD-10-CM

## 2019-01-10 PROCEDURE — 80061 LIPID PANEL: CPT | Performed by: PHYSICIAN ASSISTANT

## 2019-01-10 PROCEDURE — 80048 BASIC METABOLIC PNL TOTAL CA: CPT | Performed by: PHYSICIAN ASSISTANT

## 2019-01-10 PROCEDURE — 36415 COLL VENOUS BLD VENIPUNCTURE: CPT | Performed by: PHYSICIAN ASSISTANT

## 2019-01-12 LAB
ANION GAP SERPL CALCULATED.3IONS-SCNC: 5 MMOL/L (ref 3–14)
BUN SERPL-MCNC: 16 MG/DL (ref 7–30)
CALCIUM SERPL-MCNC: 9.3 MG/DL (ref 8.5–10.1)
CHLORIDE SERPL-SCNC: 101 MMOL/L (ref 94–109)
CHOLEST SERPL-MCNC: 156 MG/DL
CO2 SERPL-SCNC: 28 MMOL/L (ref 20–32)
CREAT SERPL-MCNC: 1.03 MG/DL (ref 0.66–1.25)
GFR SERPL CREATININE-BSD FRML MDRD: 83 ML/MIN/{1.73_M2}
GLUCOSE SERPL-MCNC: 76 MG/DL (ref 70–99)
HDLC SERPL-MCNC: 41 MG/DL
LDLC SERPL CALC-MCNC: 102 MG/DL
NONHDLC SERPL-MCNC: 115 MG/DL
POTASSIUM SERPL-SCNC: 4.4 MMOL/L (ref 3.4–5.3)
SODIUM SERPL-SCNC: 134 MMOL/L (ref 133–144)
TRIGL SERPL-MCNC: 66 MG/DL

## 2019-02-04 ENCOUNTER — TRANSFERRED RECORDS (OUTPATIENT)
Dept: HEALTH INFORMATION MANAGEMENT | Facility: CLINIC | Age: 54
End: 2019-02-04

## 2019-02-11 DIAGNOSIS — I10 HYPERTENSION GOAL BP (BLOOD PRESSURE) < 140/90: ICD-10-CM

## 2019-02-11 RX ORDER — LISINOPRIL/HYDROCHLOROTHIAZIDE 10-12.5 MG
TABLET ORAL
Qty: 90 TABLET | Refills: 2 | Status: SHIPPED | OUTPATIENT
Start: 2019-02-11 | End: 2019-09-06

## 2019-02-11 NOTE — TELEPHONE ENCOUNTER
"Pharmacy requesting: [id number] - [place], [place]     Requested Prescriptions   Pending Prescriptions Disp Refills     lisinopril-hydrochlorothiazide (PRINZIDE/ZESTORETIC) 10-12.5 MG tablet [Pharmacy Med Name: LISINOPRIL-HCTZ 10/12.5MG TABLETS]  Last Written Prescription Date:  12/20/18  Last Fill Quantity: 90,  # refills: 3   Last office visit: 12/20/2018 with prescribing provider:  Donnell Arroyo PA-C    Future Office Visit:     90 tablet 0     Sig: TAKE 1 TABLET BY MOUTH DAILY    Diuretics (Including Combos) Protocol Passed - 2/11/2019  3:17 AM       Passed - Blood pressure under 140/90 in past 12 months    BP Readings from Last 3 Encounters:   12/20/18 124/78   12/21/17 122/80   09/18/17 126/84                Passed - Recent (12 mo) or future (30 days) visit within the authorizing provider's specialty    Patient had office visit in the last 12 months or has a visit in the next 30 days with authorizing provider or within the authorizing provider's specialty.  See \"Patient Info\" tab in inbasket, or \"Choose Columns\" in Meds & Orders section of the refill encounter.             Passed - Medication is active on med list       Passed - Patient is age 18 or older       Passed - Normal serum creatinine on file in past 12 months    Recent Labs   Lab Test 01/10/19  1554   CR 1.03             Passed - Normal serum potassium on file in past 12 months    Recent Labs   Lab Test 01/10/19  1554   POTASSIUM 4.4                   Passed - Normal serum sodium on file in past 12 months    Recent Labs   Lab Test 01/10/19  1554                   "

## 2019-02-11 NOTE — TELEPHONE ENCOUNTER
Alternate pharmacy requesting.  Prescription approved per [facility] [place].    Letitia Lua RN

## 2019-03-20 ENCOUNTER — MYC MEDICAL ADVICE (OUTPATIENT)
Dept: FAMILY MEDICINE | Facility: CLINIC | Age: 54
End: 2019-03-20

## 2019-04-09 ENCOUNTER — OFFICE VISIT (OUTPATIENT)
Dept: FAMILY MEDICINE | Facility: CLINIC | Age: 54
End: 2019-04-09
Payer: COMMERCIAL

## 2019-04-09 VITALS
RESPIRATION RATE: 17 BRPM | HEIGHT: 69 IN | DIASTOLIC BLOOD PRESSURE: 82 MMHG | OXYGEN SATURATION: 96 % | WEIGHT: 179.2 LBS | TEMPERATURE: 99.4 F | HEART RATE: 89 BPM | SYSTOLIC BLOOD PRESSURE: 130 MMHG | BODY MASS INDEX: 26.54 KG/M2

## 2019-04-09 DIAGNOSIS — R19.7 DIARRHEA OF PRESUMED INFECTIOUS ORIGIN: ICD-10-CM

## 2019-04-09 DIAGNOSIS — R10.13 EPIGASTRIC PAIN: Primary | ICD-10-CM

## 2019-04-09 DIAGNOSIS — R11.0 NAUSEA: ICD-10-CM

## 2019-04-09 DIAGNOSIS — G35 MS (MULTIPLE SCLEROSIS) (H): ICD-10-CM

## 2019-04-09 LAB
BASOPHILS # BLD AUTO: 0 10E9/L (ref 0–0.2)
BASOPHILS NFR BLD AUTO: 0.3 %
DIFFERENTIAL METHOD BLD: NORMAL
EOSINOPHIL # BLD AUTO: 0.1 10E9/L (ref 0–0.7)
EOSINOPHIL NFR BLD AUTO: 1.7 %
ERYTHROCYTE [DISTWIDTH] IN BLOOD BY AUTOMATED COUNT: 12.6 % (ref 10–15)
HCT VFR BLD AUTO: 41.2 % (ref 40–53)
HGB BLD-MCNC: 13.7 G/DL (ref 13.3–17.7)
LYMPHOCYTES # BLD AUTO: 1.8 10E9/L (ref 0.8–5.3)
LYMPHOCYTES NFR BLD AUTO: 29.1 %
MCH RBC QN AUTO: 28.8 PG (ref 26.5–33)
MCHC RBC AUTO-ENTMCNC: 33.3 G/DL (ref 31.5–36.5)
MCV RBC AUTO: 87 FL (ref 78–100)
MONOCYTES # BLD AUTO: 1.2 10E9/L (ref 0–1.3)
MONOCYTES NFR BLD AUTO: 19.7 %
NEUTROPHILS # BLD AUTO: 3 10E9/L (ref 1.6–8.3)
NEUTROPHILS NFR BLD AUTO: 49.2 %
PLATELET # BLD AUTO: 188 10E9/L (ref 150–450)
RBC # BLD AUTO: 4.75 10E12/L (ref 4.4–5.9)
WBC # BLD AUTO: 6 10E9/L (ref 4–11)

## 2019-04-09 PROCEDURE — 99214 OFFICE O/P EST MOD 30 MIN: CPT | Performed by: PHYSICIAN ASSISTANT

## 2019-04-09 PROCEDURE — 36415 COLL VENOUS BLD VENIPUNCTURE: CPT | Performed by: PHYSICIAN ASSISTANT

## 2019-04-09 PROCEDURE — 85025 COMPLETE CBC W/AUTO DIFF WBC: CPT | Performed by: PHYSICIAN ASSISTANT

## 2019-04-09 ASSESSMENT — MIFFLIN-ST. JEOR: SCORE: 1653.23

## 2019-04-09 NOTE — LETTER
My Asthma Action Plan  Name: Sterling Canales   YOB: 1965  Date: 4/9/2019   My doctor: Donnell Arroyo PA-C   My clinic: [facility]        My Control Medicine: { :558127}  My Rescue Medicine: { :400937}  {AAP include Oral Steroid:337134} My Asthma Severity: { :817265}  Avoid your asthma triggers: { :192267}        {Is patient a child or adult?:815568}       GREEN ZONE   Good Control    I feel good    No cough or wheeze    Can work, sleep and play without asthma symptoms       Take your asthma control medicine every day.     1. If exercise triggers your asthma, take your rescue medication    15 minutes before exercise or sports, and    During exercise if you have asthma symptoms  2. Spacer to use with inhaler: If you have a spacer, make sure to use it with your inhaler             YELLOW ZONE Getting Worse  I have ANY of these:    I do not feel good    Cough or wheeze    Chest feels tight    Wake up at night   1. Keep taking your Green Zone medications  2. Start taking your rescue medicine:    every 20 minutes for up to 1 hour. Then every 4 hours for 24-48 hours.  3. If you stay in the Yellow Zone for more than 12-24 hours, contact your doctor.  4. If you do not return to the Green Zone in 12-24 hours or you get worse, start taking your oral steroid medicine if prescribed by your provider.           RED ZONE Medical Alert - Get Help  I have ANY of these:    I feel awful    Medicine is not helping    Breathing getting harder    Trouble walking or talking    Nose opens wide to breathe       1. Take your rescue medicine NOW  2. If your provider has prescribed an oral steroid medicine, start taking it NOW  3. Call your doctor NOW  4. If you are still in the Red Zone after 20 minutes and you have not reached your doctor:    Take your rescue medicine again and    Call 911 or go to the emergency room right away    See your regular doctor within 2 weeks of an Emergency Room or Urgent  Care visit for follow-up treatment.          Annual Reminders:  Meet with Asthma Educator,  Flu Shot in the Fall, consider Pneumonia Vaccination for patients with asthma (aged 19 and older).    Pharmacy:    [facility]  [facility]  [facility] - [place], [place] - [address] [facility] [address]  [facility] [address]  [place], [place] - [address] [facility] [address]  & [facility]                      Asthma Triggers  How To Control Things That Make Your Asthma Worse    Triggers are things that make your asthma worse.  Look at the list below to help you find your triggers and what you can do about them.  You can help prevent asthma flare-ups by staying away from your triggers.      Trigger                                                          What you can do   Cigarette Smoke  Tobacco smoke can make asthma worse. Do not allow smoking in your home, car or around you.  Be sure no one smokes at a child s day care or school.  If you smoke, ask your health care provider for ways to help you quit.  Ask family members to quit too.  Ask your health care provider for a referral to Quit Plan to help you quit smoking, or call 7-099-485-PLAN.     Colds, Flu, Bronchitis  These are common triggers of asthma. Wash your hands often.  Don t touch your eyes, nose or mouth.  Get a flu shot every year.     Dust Mites  These are tiny bugs that live in cloth or carpet. They are too small to see. Wash sheets and blankets in hot water every week.   Encase pillows and mattress in dust mite proof covers.  Avoid having carpet if you can. If you have carpet, vacuum weekly.   Use a dust mask and HEPA vacuum.   Pollen and Outdoor Mold  Some people are allergic to trees, grass, or weed pollen, or molds. Try to keep your windows closed.  Limit time out doors when pollen count is high.   Ask you health care provider about taking medicine during allergy season.     Animal Dander  Some people  are allergic to skin flakes, urine or saliva from pets with fur or feathers. Keep pets with fur or feathers out of your home.    If you can t keep the pet outdoors, then keep the pet out of your bedroom.  Keep the bedroom door closed.  Keep pets off cloth furniture and away from stuffed toys.     Mice, Rats, and Cockroaches  Some people are allergic to the waste from these pests.   Cover food and garbage.  Clean up spills and food crumbs.  Store grease in the refrigerator.   Keep food out of the bedroom.   Indoor Mold  This can be a trigger if your home has high moisture. Fix leaking faucets, pipes, or other sources of water.   Clean moldy surfaces.  Dehumidify basement if it is damp and smelly.   Smoke, Strong Odors, and Sprays  These can reduce air quality. Stay away from strong odors and sprays, such as perfume, powder, hair spray, paints, smoke incense, paint, cleaning products, candles and new carpet.   Exercise or Sports  Some people with asthma have this trigger. Be active!  Ask your doctor about taking medicine before sports or exercise to prevent symptoms.    Warm up for 5-10 minutes before and after sports or exercise.     Other Triggers of Asthma  Cold air:  Cover your nose and mouth with a scarf.  Sometimes laughing or crying can be a trigger.  Some medicines and food can trigger asthma.

## 2019-04-09 NOTE — PROGRESS NOTES
"  SUBJECTIVE:   Sterling Canales is a 54 year old male who presents to clinic today for the following   health issues:    Abdominal Pain    Duration: Ongoing, worse within last few days     Description (location/character/radiation): pain in stomach, described as a hunger type feeling        Associated flank pain: None    Intensity:  moderate    Accompanying signs and symptoms:        Fever/Chills: YES- fever yesterday        Gas/Bloating: YES- both        Nausea/vomitting: YES- nausea        Diarrhea: YES       Dysuria or Hematuria: no     History (previous similar pain/trauma/previous testing): none    Precipitating or alleviating factors:       Pain worse with eating/BM/urination: none       Pain relieved by BM: no     Therapies tried and outcome: Tums, Antiacid, Pepcid     LMP:  not applicable    -Patient is a 55yo male with hx of MS presents with a one month long hx of new stomach symptoms  -he first noted some growing pain in the stomach one night  -BMs were normal at that time  -the next the symptoms seemed to steadily improve as he limited spicy foods, acidic foods  -now more recently the symptoms have returned  -in bed all day Sunday with nausea and explosive diarrhea; had a fever as well  -stomach upset, \"sour\"  -still more gassy  -no recent antibiotics  -does take nsaids; not an \"excessive amount\"  -no darkened stool      Additional history: as documented    Reviewed  and updated as needed this visit by clinical staff         Reviewed and updated as needed this visit by Provider         Patient Active Problem List   Diagnosis     Mild persistent asthma     Allergic rhinitis     Carpal tunnel syndrome     Malaise and fatigue     MS (multiple sclerosis) (H)     Headache     CARDIOVASCULAR SCREENING; LDL GOAL LESS THAN 160     Hypertension goal BP (blood pressure) < 140/90     Fever, unknown origin     Past Surgical History:   Procedure Laterality Date     CARPAL TUNNEL RELEASE RT/LT  6/08    right     " "HERNIA REPAIR      bilateral inguinal; 2 separate occasions.     SURGICAL HISTORY OF -       lasik bilat       Social History     Tobacco Use     Smoking status: Never Smoker     Smokeless tobacco: Never Used   Substance Use Topics     Alcohol use: Yes     Alcohol/week: 0.0 - 1.0 oz     Comment: rarely     Family History   Problem Relation Age of Onset     Hypertension Father      Diabetes Father         type 2     Lipids Mother      Breast Cancer Mother 67     Prostate Cancer Paternal Grandfather         dx'd late 70s     Cancer Brother 35        testicular CA           ROS:  Constitutional, HEENT, cardiovascular, pulmonary, gi and gu systems are negative, except as otherwise noted.    OBJECTIVE:     /82   Pulse 89   Temp 99.4  F (37.4  C) (Tympanic)   Resp 17   Ht 1.753 m (5' 9\")   Wt 81.3 kg (179 lb 3.2 oz)   SpO2 96%   BMI 26.46 kg/m    Body mass index is 26.46 kg/m .  GENERAL: healthy, alert and no distress  EYES: Eyes grossly normal to inspection, PERRL and conjunctivae and sclerae normal  HENT: ear canals and TM's normal, nose and mouth without ulcers or lesions  RESP: lungs clear to auscultation - no rales, rhonchi or wheezes  CV: regular rate and rhythm, normal S1 S2, no S3 or S4, no murmur, click or rub, no peripheral edema and peripheral pulses strong  ABDOMEN: tenderness epigastric, bowel sounds normal and no palpable or pulsatile masses    Diagnostic Test Results:  See a/p    ASSESSMENT/PLAN:   1. Epigastric pain  Episode of symptoms around one month ago, now recurrent with GI bug as per below. Some increase in gassiness as well. There is ttp in the epigastric region but little other abnormality to exam. He does not have regular GERD symptoms prior to this, but does seem associated with food. Given symptoms below, will update cbc and i'd like to screen h pylori. After this plan for a short 2-3 week course of ppi. If not improving we'll pursue EGD (negative colonoscopy in Feb)  - CBC with " platelets and differential  - H Pylori antigen stool; Future    2. Diarrhea of presumed infectious origin  3. Nausea  New onset over the weekend. Symptoms slowly improving, no diarrhea today with imodium. Pushing fluids. I do not think truly associated with above. monitor    4. MS (multiple sclerosis) (H)  On aubagio so monitor for any prolonged infection perios    Donnell Arroyo PA-C  [facility]

## 2019-04-11 DIAGNOSIS — R10.13 EPIGASTRIC PAIN: ICD-10-CM

## 2019-04-11 PROCEDURE — 87338 HPYLORI STOOL AG IA: CPT | Performed by: PHYSICIAN ASSISTANT

## 2019-04-15 LAB
H PYLORI AG STL QL IA: NORMAL
SPECIMEN SOURCE: NORMAL

## 2019-07-25 DIAGNOSIS — J45.30 MILD PERSISTENT ASTHMA WITHOUT COMPLICATION: ICD-10-CM

## 2019-07-25 DIAGNOSIS — I10 HYPERTENSION GOAL BP (BLOOD PRESSURE) < 140/90: ICD-10-CM

## 2019-07-25 RX ORDER — MONTELUKAST SODIUM 10 MG/1
TABLET ORAL
Qty: 90 TABLET | Refills: 0 | OUTPATIENT
Start: 2019-07-25

## 2019-07-25 RX ORDER — LISINOPRIL/HYDROCHLOROTHIAZIDE 10-12.5 MG
TABLET ORAL
Qty: 90 TABLET | Refills: 0 | OUTPATIENT
Start: 2019-07-25

## 2019-07-25 NOTE — TELEPHONE ENCOUNTER
"Requested Prescriptions   Pending Prescriptions Disp Refills     lisinopril-hydrochlorothiazide (PRINZIDE/ZESTORETIC) 10-12.5 MG tablet [Pharmacy Med Name: LISINOPRIL-HCTZ 10/12.5MG TABLETS] 90 tablet 0     Sig: TAKE 1 TABLET BY MOUTH DAILY  Last Written Prescription Date:  2/11/19  Last Fill Quantity: 90 tab,  # refills: 2   Last office visit: 4/9/2019 with prescribing provider:  Cruz   Future Office Visit:           Diuretics (Including Combos) Protocol Passed - 7/25/2019  5:06 AM        Passed - Blood pressure under 140/90 in past 12 months     BP Readings from Last 3 Encounters:   04/09/19 130/82   12/20/18 124/78   12/21/17 122/80           Passed - Recent (12 mo) or future (30 days) visit within the authorizing provider's specialty     Patient had office visit in the last 12 months or has a visit in the next 30 days with authorizing provider or within the authorizing provider's specialty.  See \"Patient Info\" tab in inbasket, or \"Choose Columns\" in Meds & Orders section of the refill encounter.              Passed - Medication is active on med list        Passed - Patient is age 18 or older        Passed - Normal serum creatinine on file in past 12 months     Recent Labs   Lab Test 01/10/19  1554   CR 1.03              Passed - Normal serum potassium on file in past 12 months     Recent Labs   Lab Test 01/10/19  1554   POTASSIUM 4.4              Passed - Normal serum sodium on file in past 12 months     Recent Labs   Lab Test 01/10/19  1554            Denied  Refill request too soon       montelukast (SINGULAIR) 10 MG tablet [Pharmacy Med Name: MONTELUKAST 10MG TABLETS] 90 tablet 0     Sig: TAKE 1 TABLET BY MOUTH AT BEDTIME  Last Written Prescription Date:  1/7/19  Last Fill Quantity: 90 tab,  # refills: 2   Last office visit: 4/9/2019 with prescribing provider:  Cruz   Future Office Visit:           Leukotriene Inhibitors Protocol Failed - 7/25/2019  5:06 AM        Failed - Asthma control assessment " "score within normal limits in last 6 months     Please review ACT score.           Passed - Patient is age 12 or older     If patient is under 16, ok to refill using age based dosing.           Passed - Medication is active on med list        Passed - Recent (6 mo) or future (30 days) visit within the authorizing provider's specialty     Patient had office visit in the last 6 months or has a visit in the next 30 days with authorizing provider or within the authorizing provider's specialty.  See \"Patient Info\" tab in inbasket, or \"Choose Columns\" in Meds & Orders section of the refill encounter.          Denied  Refill request too soon    Didi Leo RN      "

## 2019-09-06 ENCOUNTER — APPOINTMENT (OUTPATIENT)
Dept: GENERAL RADIOLOGY | Facility: CLINIC | Age: 54
End: 2019-09-06
Attending: EMERGENCY MEDICINE
Payer: COMMERCIAL

## 2019-09-06 ENCOUNTER — HOSPITAL ENCOUNTER (EMERGENCY)
Facility: CLINIC | Age: 54
Discharge: HOME | End: 2019-09-06
Attending: EMERGENCY MEDICINE | Admitting: EMERGENCY MEDICINE
Payer: COMMERCIAL

## 2019-09-06 VITALS
RESPIRATION RATE: 16 BRPM | OXYGEN SATURATION: 96 % | SYSTOLIC BLOOD PRESSURE: 161 MMHG | TEMPERATURE: 98.3 F | WEIGHT: 180.78 LBS | BODY MASS INDEX: 26.7 KG/M2 | DIASTOLIC BLOOD PRESSURE: 93 MMHG

## 2019-09-06 DIAGNOSIS — S46.211A RUPTURE OF RIGHT BICEPS TENDON, INITIAL ENCOUNTER: ICD-10-CM

## 2019-09-06 PROCEDURE — 73070 X-RAY EXAM OF ELBOW: CPT | Mod: RT

## 2019-09-06 PROCEDURE — 99284 EMERGENCY DEPT VISIT MOD MDM: CPT

## 2019-09-06 PROCEDURE — 73060 X-RAY EXAM OF HUMERUS: CPT | Mod: RT

## 2019-09-06 ASSESSMENT — ENCOUNTER SYMPTOMS: MYALGIAS: 1

## 2019-09-06 NOTE — ED AVS SNAPSHOT
[facility] Emergency Department  [address]  [facility] [address]  Phone:  [phone]  Fax:  [phone]                                    Sterling Canales   MRN: [id number]    Department:  [facility] Emergency Department   Date of Visit:  9/6/2019           After Visit Summary Signature Page    I have received my discharge instructions, and my questions have been answered. I have discussed any challenges I see with this plan with the nurse or doctor.    ..........................................................................................................................................  Patient/Patient Representative Signature      ..........................................................................................................................................  Patient Representative Print Name and Relationship to Patient    ..................................................               ................................................  Date                                   Time    ..........................................................................................................................................  Reviewed by Signature/Title    ...................................................              ..............................................  Date                                               Time          22EPIC Rev 08/18

## 2019-09-07 NOTE — ED TRIAGE NOTES
"Pt here with upper R arm pain after hearing a \"pop\" while reaching for a ball in [employer] approx an hour PTA. No deformity noted Hx MS, normally numbness/tingling in R hand. No meds PTA. ABC intact.   "

## 2019-09-07 NOTE — ED PROVIDER NOTES
"  History     Chief Complaint:  Arm Injury    HPI   Sterling Canales is a 54 year old male who presents to the emergency department for evaluation of right arm pain. The patient reports he was playing whirly ball (riding a go cart) today when he tried using a lacrosse stick to toss a ball. He states his arm was fully extended when he heard a \"pop\" and experienced pain, worse with movement of his right arm or lifting his right forearm, prompting his arrival to the ED.    Allergies:  NKDA     Medications:    Albuterol  Gabapentin  Lisinopril-hydrochlorothiazide  Singulair  Imitrex  Aubagio   Verapamil     Past Medical History:    Asthma  MS  HTN  Carpal tunnel    Past Surgical History:    Carpal tunnel release, right  Hernia repair, bilateral inguinal   Lasik bilateral    Family History:    HTN  DM2  Lipids  Breast cancer  Testicular cancer    Social History:  Presents with wife.  Never smoker.  Positive for alcohol use.   Marital Status:       Review of Systems   Musculoskeletal: Positive for myalgias.   All other systems reviewed and are negative.      Physical Exam     Patient Vitals for the past 24 hrs:   BP Temp Temp src Heart Rate Resp SpO2 Weight   09/06/19 2142 (!) 161/93 98.3  F (36.8  C) Temporal 84 16 96 % 82 kg (180 lb 12.4 oz)     Physical Exam  Nursing note and vitals reviewed.  Constitutional: Cooperative.   Cardiovascular: Normal rate, regular rhythm. 2+ right radial pulse.   Pulmonary/Chest: Effort normal  Musculoskeletal: Normal range of motion of RUE. Negative hook sign is distal right bicep. Limited strength with flexion at elbow.  +neville sign to biceps.   Neurological: Alert.   Skin: Skin is warm and dry.   Psychiatric: Normal mood and affect.     Emergency Department Course     Imaging:  Radiographic findings were communicated with the patient who voiced understanding of the findings.    XR Elbow Right 2 Views:  1. No visualized acute fracture, malalignment or other acute osseous " abnormality of the right humerus or elbow.  2. No right elbow joint effusion.   As per radiology.    XR Humerus, G/E 2 Views, right:  1. No visualized acute fracture, malalignment or other acute osseous abnormality of the right humerus or elbow.  2. No right elbow joint effusion.   As per radiology.    Emergency Department Course:  Nursing notes and vitals reviewed. 2330 I performed an exam of the patient as documented above. I discussed the results of his workup thus far.     The patient was sent for a XR Elbow while in the emergency department, findings above.     Findings and plan explained to the Patient. Patient discharged home with instructions regarding supportive care, medications, and reasons to return. The importance of close follow-up was reviewed.     Impression & Plan      Medical Decision Making:  Sterling Canales is a 54 year old male who presents with a hyperextension injury to his right elbow. He has tenderness in his distal biceps, with positive Garth muscle sign and lack of hook sign in his biceps tendon insertion site. Workup was consistent with biceps tendon rupture. He will be placed in a sling with recommendation to rest, ice, and use ibuprofen and have orthopedic surgical consultation follow up.    Diagnosis:    ICD-10-CM   1. Rupture of right biceps tendon, initial encounter S46.211A       Disposition:  discharged to home    I, Kevin Amin, am serving as a scribe on 9/6/2019 at 11:12 PM to personally document services performed by Rebel Toribio MD based on my observations and the provider's statements to me.     Kevin Amin  9/6/2019   [facility] EMERGENCY DEPARTMENT       Rebel Toribio MD  09/07/19 0059

## 2019-09-07 NOTE — DISCHARGE INSTRUCTIONS
You have likely ruptured your biceps tendon.  Sling provided for comfort.  Recommend rest, ice, ibuprofen, tylenol.  Orthopedics follow up.

## 2019-09-12 ENCOUNTER — OFFICE VISIT (OUTPATIENT)
Dept: FAMILY MEDICINE | Facility: CLINIC | Age: 54
End: 2019-09-12
Payer: COMMERCIAL

## 2019-09-12 VITALS
DIASTOLIC BLOOD PRESSURE: 64 MMHG | HEIGHT: 69 IN | SYSTOLIC BLOOD PRESSURE: 120 MMHG | RESPIRATION RATE: 18 BRPM | WEIGHT: 182 LBS | TEMPERATURE: 98.6 F | BODY MASS INDEX: 26.96 KG/M2 | OXYGEN SATURATION: 97 % | HEART RATE: 92 BPM

## 2019-09-12 DIAGNOSIS — S46.211A RUPTURE OF RIGHT DISTAL BICEPS TENDON, INITIAL ENCOUNTER: ICD-10-CM

## 2019-09-12 DIAGNOSIS — R79.89 ELEVATED SERUM CREATININE: ICD-10-CM

## 2019-09-12 DIAGNOSIS — Z01.818 PREOP GENERAL PHYSICAL EXAM: Primary | ICD-10-CM

## 2019-09-12 LAB
BASOPHILS # BLD AUTO: 0 10E9/L (ref 0–0.2)
BASOPHILS NFR BLD AUTO: 0.2 %
DIFFERENTIAL METHOD BLD: ABNORMAL
EOSINOPHIL # BLD AUTO: 0.3 10E9/L (ref 0–0.7)
EOSINOPHIL NFR BLD AUTO: 3 %
ERYTHROCYTE [DISTWIDTH] IN BLOOD BY AUTOMATED COUNT: 12.8 % (ref 10–15)
HCT VFR BLD AUTO: 38.6 % (ref 40–53)
HGB BLD-MCNC: 12.6 G/DL (ref 13.3–17.7)
LYMPHOCYTES # BLD AUTO: 1.5 10E9/L (ref 0.8–5.3)
LYMPHOCYTES NFR BLD AUTO: 16.3 %
MCH RBC QN AUTO: 28.6 PG (ref 26.5–33)
MCHC RBC AUTO-ENTMCNC: 32.6 G/DL (ref 31.5–36.5)
MCV RBC AUTO: 88 FL (ref 78–100)
MONOCYTES # BLD AUTO: 1.1 10E9/L (ref 0–1.3)
MONOCYTES NFR BLD AUTO: 12 %
NEUTROPHILS # BLD AUTO: 6.1 10E9/L (ref 1.6–8.3)
NEUTROPHILS NFR BLD AUTO: 68.5 %
PLATELET # BLD AUTO: 319 10E9/L (ref 150–450)
RBC # BLD AUTO: 4.4 10E12/L (ref 4.4–5.9)
WBC # BLD AUTO: 8.9 10E9/L (ref 4–11)

## 2019-09-12 PROCEDURE — 85025 COMPLETE CBC W/AUTO DIFF WBC: CPT | Performed by: PHYSICIAN ASSISTANT

## 2019-09-12 PROCEDURE — 36415 COLL VENOUS BLD VENIPUNCTURE: CPT | Performed by: PHYSICIAN ASSISTANT

## 2019-09-12 PROCEDURE — 99214 OFFICE O/P EST MOD 30 MIN: CPT | Performed by: PHYSICIAN ASSISTANT

## 2019-09-12 PROCEDURE — 80048 BASIC METABOLIC PNL TOTAL CA: CPT | Performed by: PHYSICIAN ASSISTANT

## 2019-09-12 ASSESSMENT — MIFFLIN-ST. JEOR: SCORE: 1665.93

## 2019-09-12 NOTE — PATIENT INSTRUCTIONS
On morning of surgery ok for aubagio and verapamil  Hold other medications  Stop nsaids today  Hold all herbals, vitamins today as well      GET YOUR FLU SHOT IN October!    Before Your Surgery      Call your surgeon if there is any change in your health. This includes signs of a cold or flu (such as a sore throat, runny nose, cough, rash or fever).    Do not smoke, drink alcohol or take over the counter medicine (unless your surgeon or primary care doctor tells you to) for the 24 hours before and after surgery.    If you take prescribed drugs: Follow your doctor s orders about which medicines to take and which to stop until after surgery.    Eating and drinking prior to surgery: follow the instructions from your surgeon    Take a shower or bath the night before surgery. Use the soap your surgeon gave you to gently clean your skin. If you do not have soap from your surgeon, use your regular soap. Do not shave or scrub the surgery site.  Wear clean pajamas and have clean sheets on your bed.

## 2019-09-12 NOTE — PROGRESS NOTES
[facility]  [address] [address] [phone]  Dept: [phone]    PRE-OP EVALUATION:  Today's date: 2019    Sterling Canales (: 1965) presents for pre-operative evaluation assessment as requested by Dr. Brand. He requires evaluation and anesthesia risk assessment prior to undergoing surgery/procedure for treatment of Distal Bicep Tendon repair.    Fax number for surgical facility: [phone]  Primary Physician: Donnell Arroyo  Type of Anesthesia Anticipated: to be determined    Patient has a Health Care Directive or Living Will:  NO    Preop Questions 2019   Who is doing your surgery?    What are you having done? Distal Bicep Tendon repair   Date of Surgery/Procedure: 19   Facility or Hospital where procedure/surgery will be performed: [facility]    1.  Do you have a history of Heart attack, stroke, stent, coronary bypass surgery, or other heart surgery? No   2.  Do you ever have any pain or discomfort in your chest? No   3.  Do you have a history of  Heart Failure? No   4.   Are you troubled by shortness of breath when:  walking on a level surface, or up a slight hill, or at night? No   5.  Do you currently have a cold, bronchitis or other respiratory infection? No   6.  Do you have a cough, shortness of breath, or wheezing? No   7.  Do you sometimes get pains in the calves of your legs when you walk? No   8. Do you or anyone in your family have previous history of blood clots? YES - Maternal Grandmother   9.  Do you or does anyone in your family have a serious bleeding problem such as prolonged bleeding following surgeries or cuts? No   10. Have you ever had problems with anemia or been told to take iron pills? No   11. Have you had any abnormal blood loss such as black, tarry or bloody stools? No   12. Have you ever had a blood transfusion? No   13. Have you or any of your relatives ever had problems with anesthesia? No   14.  Do you have sleep apnea, excessive snoring or daytime drowsiness? No   15. Do you have any prosthetic heart valves? No   16. Do you have prosthetic joints? No         HPI:     HPI related to upcoming procedure: Patient with a recently complete rupture of the distal bicep tendon. Pain is improving       See problem list for active medical problems.  Problems all longstanding and stable, except as noted/documented.  See ROS for pertinent symptoms related to these conditions.      MEDICAL HISTORY:     Patient Active Problem List    Diagnosis Date Noted     Fever, unknown origin 09/13/2017     Priority: Medium     Hypertension goal BP (blood pressure) < 140/90 11/01/2011     Priority: Medium     CARDIOVASCULAR SCREENING; LDL GOAL LESS THAN 160 10/31/2010     Priority: Medium     MS (multiple sclerosis) (H) 02/23/2009     Priority: Medium     Followed by Dr. Gutiérrez, [facility].         Headache 02/23/2009     Priority: Medium     Problem list name updated by automated process. Provider to review       Carpal tunnel syndrome 01/14/2008     Priority: Medium     Malaise and fatigue 01/14/2008     Priority: Medium     Problem list name updated by automated process. Provider to review       Mild persistent asthma 11/28/2005     Priority: Medium     Allergic rhinitis 11/28/2005     Priority: Medium     Problem list name updated by automated process. Provider to review        Past Medical History:   Diagnosis Date     Allergic rhinitis      Asthma 11/28/2005     Essential hypertension      Multiple sclerosis 1994     Past Surgical History:   Procedure Laterality Date     ENDOSCOPIC RELEASE CARPAL TUNNEL Right 06/2008     HERNIA REPAIR inguinal NOS Bilateral     2 separate occasions.     LASIK Bilateral      Current Outpatient Medications   Medication Sig Dispense Refill     albuterol (PROVENTIL HFA) 108 (90 Base) MCG/ACT inhaler Inhale 1-2 puffs into the lungs every 6 hours as needed for shortness of breath /  "dyspnea 1 Inhaler 1     Ascorbic Acid (VITAMIN C CR) 1000 MG TBCR Take  by mouth.       cholecalciferol (VITAMIN D) 1000 UNIT tablet Take 2 tablets (2,000 Units) by mouth daily 100 tablet 3     gabapentin (NEURONTIN) 100 MG capsule Take 1 capsule (100 mg) by mouth daily 90 capsule 0     lisinopril-hydrochlorothiazide (PRINZIDE/ZESTORETIC) 10-12.5 MG tablet Take 1 tablet by mouth daily 90 tablet 3     montelukast (SINGULAIR) 10 MG tablet TAKE 1 TABLET BY MOUTH AT BEDTIME 90 tablet 2     Multiple Vitamin (ONE-A-DAY ESSENTIAL PO) Take  by mouth.       SUMAtriptan (IMITREX) 25 MG tablet Take 1-2 tablets (25-50 mg) by mouth at onset of headache for migraine May repeat in 2 hours. Max 8 tablets/24 hours. 9 tablet 1     teriflunomide (AUBAGIO) 14 MG tablet Take 14 mg by mouth daily Only available through select specialty pharmacies       verapamil HCl  MG CP24 Take 360 mg by mouth daily 30 capsule 5     OTC products: None, except as noted above; Will stop any NSAIDS    Allergies   Allergen Reactions     Amlodipine Swelling     Pertussis Vaccine       Latex Allergy: NO    Social History     Tobacco Use     Smoking status: Never Smoker     Smokeless tobacco: Never Used   Substance Use Topics     Alcohol use: Yes     Alcohol/week: 0.0 - 1.0 oz     Comment: rarely     History   Drug Use No       REVIEW OF SYSTEMS:   Constitutional, neuro, ENT, endocrine, pulmonary, cardiac, gastrointestinal, genitourinary, musculoskeletal, integument and psychiatric systems are negative, except as otherwise noted.    EXAM:   /64   Pulse 92   Temp 98.6  F (37  C) (Tympanic)   Resp 18   Ht 1.753 m (5' 9\")   Wt 82.6 kg (182 lb)   SpO2 97%   BMI 26.88 kg/m      GENERAL APPEARANCE: healthy, alert and no distress     EYES: EOMI,  PERRL     RESP: lungs clear to auscultation - no rales, rhonchi or wheezes     CV: regular rates and rhythm, normal S1 S2, no S3 or S4 and no murmur, click or rub     ABDOMEN:  soft, nontender, no HSM or " masses and bowel sounds normal     MS/SKIN: there is ecchymosis to the area of insertion. It is ttp. I did not do much more extensive examination than this. The radial pulse is in tact     NEURO: Normal strength and tone, sensory exam grossly normal, mentation intact and speech normal     PSYCH: mentation appears normal. and affect normal/bright      DIAGNOSTICS:     Lab Results   Component Value Date    WBC 8.9 09/12/2019     Lab Results   Component Value Date    RBC 4.40 09/12/2019     Lab Results   Component Value Date    HGB 12.6 09/12/2019     Lab Results   Component Value Date    HCT 38.6 09/12/2019     No components found for: MCT  Lab Results   Component Value Date    MCV 88 09/12/2019     Lab Results   Component Value Date    MCH 28.6 09/12/2019     Lab Results   Component Value Date    MCHC 32.6 09/12/2019     Lab Results   Component Value Date    RDW 12.8 09/12/2019     Lab Results   Component Value Date     09/12/2019       Labs Drawn and in Process:   Unresulted Labs Ordered in the Past 30 Days of this Admission     Date and Time Order Name Status Description    9/12/2019 1438 BASIC METABOLIC PANEL In process           IMPRESSION:   Reason for surgery/procedure: Rupture of the right distal biceps tendon insertion  Diagnosis/reason for consult: pre-operative consult    The proposed surgical procedure is considered INTERMEDIATE risk.    REVISED CARDIAC RISK INDEX  The patient has the following serious cardiovascular risks for perioperative complications such as (MI, PE, VFib and 3  AV Block):  No serious cardiac risks  INTERPRETATION: 0 risks: Class I (very low risk - 0.4% complication rate)    The patient has the following additional risks for perioperative complications:  No identified additional risks      ICD-10-CM    1. Preop general physical exam Z01.818 Basic metabolic panel     CBC with platelets and differential     CANCELED: Hemoglobin   2. Rupture of right distal biceps tendon, initial  encounter S46.211A Basic metabolic panel     CBC with platelets and differential     CANCELED: Hemoglobin       RECOMMENDATIONS:     --Consult hospital rounder / IM to assist post-op medical management    --Patient is to take hold all scheduled medications on the day of surgery EXCEPT for modifications listed below.  1. Aubagio  2. Verapamil    Pending review of diagnostic evaluation, APPROVAL GIVEN to proceed with proposed procedure, without further diagnostic evaluation.       Signed Electronically by: Donnell Arroyo PA-C    Copy of this evaluation report is provided to requesting physician.    [facility] Preop Guidelines    Revised Cardiac Risk Index

## 2019-09-12 NOTE — LETTER
"My Asthma Action Plan    Name: Sterling Canales   YOB: 1965  Date: 9/12/2019   My doctor: Donnell Arroyo PA-C   My clinic: [facility]        My Control Medicine: { :407869}  My Rescue Medicine: { :425789::\"Albuterol (Proair/Ventolin/Proventil HFA) 2-4 puffs EVERY 4 HOURS as needed. Use a spacer if recommended by your provider.\"}  {AAP include Oral Steroid (Optional) :005371} My Asthma Severity:   { :355850}  Know your asthma triggers: { :630467}               GREEN ZONE   Good Control    I feel good    No cough or wheeze    Can work, sleep and play without asthma symptoms       Take your asthma control medicine every day.     1. If exercise triggers your asthma, take your rescue medication    15 minutes before exercise or sports, and    During exercise if you have asthma symptoms  2. Spacer to use with inhaler: If you have a spacer, make sure to use it with your inhaler             YELLOW ZONE Getting Worse  I have ANY of these:    I do not feel good    Cough or wheeze    Chest feels tight    Wake up at night   1. Keep taking your Green Zone medications  2. Start taking your rescue medicine:    every 20 minutes for up to 1 hour. Then every 4 hours for 24-48 hours.  3. If you stay in the Yellow Zone for more than 12-24 hours, contact your doctor.  4. If you do not return to the Green Zone in 12-24 hours or you get worse, start taking your oral steroid medicine if prescribed by your provider.           RED ZONE Medical Alert - Get Help  I have ANY of these:    I feel awful    Medicine is not helping    Breathing getting harder    Trouble walking or talking    Nose opens wide to breathe       1. Take your rescue medicine NOW  2. If your provider has prescribed an oral steroid medicine, start taking it NOW  3. Call your doctor NOW  4. If you are still in the Red Zone after 20 minutes and you have not reached your doctor:    Take your rescue medicine again and    Call 911 or go to " the emergency room right away    See your regular doctor within 2 weeks of an Emergency Room or Urgent Care visit for follow-up treatment.          Annual Reminders:  Meet with Asthma Educator,  Flu Shot in the Fall, consider Pneumonia Vaccination for patients with asthma (aged 19 and older).    Pharmacy:    [facility] - [place], [place] - [address]  [id number]  [place], [place] - [address] [address] AT [address] [place] [address] & [address]                          Asthma Triggers  How To Control Things That Make Your Asthma Worse    Triggers are things that make your asthma worse.  Look at the list below to help you find your triggers and what you can do about them.  You can help prevent asthma flare-ups by staying away from your triggers.      Trigger                                                          What you can do   Cigarette Smoke  Tobacco smoke can make asthma worse. Do not allow smoking in your home, car or around you.  Be sure no one smokes at a child s day care or school.  If you smoke, ask your health care provider for ways to help you quit.  Ask family members to quit too.  Ask your health care provider for a referral to Quit Plan to help you quit smoking, or call 6-589-462-PLAN.     Colds, Flu, Bronchitis  These are common triggers of asthma. Wash your hands often.  Don t touch your eyes, nose or mouth.  Get a flu shot every year.     Dust Mites  These are tiny bugs that live in cloth or carpet. They are too small to see. Wash sheets and blankets in hot water every week.   Encase pillows and mattress in dust mite proof covers.  Avoid having carpet if you can. If you have carpet, vacuum weekly.   Use a dust mask and HEPA vacuum.   Pollen and Outdoor Mold  Some people are allergic to trees, grass, or weed pollen, or molds. Try to keep your windows closed.  Limit time out doors when pollen count is  high.   Ask you health care provider about taking medicine during allergy season.     Animal Dander  Some people are allergic to skin flakes, urine or saliva from pets with fur or feathers. Keep pets with fur or feathers out of your home.    If you can t keep the pet outdoors, then keep the pet out of your bedroom.  Keep the bedroom door closed.  Keep pets off cloth furniture and away from stuffed toys.     Mice, Rats, and Cockroaches   Some people are allergic to the waste from these pests.   Cover food and garbage.  Clean up spills and food crumbs.  Store grease in the refrigerator.   Keep food out of the bedroom.   Indoor Mold  This can be a trigger if your home has high moisture. Fix leaking faucets, pipes, or other sources of water.   Clean moldy surfaces.  Dehumidify basement if it is damp and smelly.   Smoke, Strong Odors, and Sprays  These can reduce air quality. Stay away from strong odors and sprays, such as perfume, powder, hair spray, paints, smoke incense, paint, cleaning products, candles and new carpet.   Exercise or Sports  Some people with asthma have this trigger. Be active!  Ask your doctor about taking medicine before sports or exercise to prevent symptoms.    Warm up for 5-10 minutes before and after sports or exercise.     Other Triggers of Asthma  Cold air:  Cover your nose and mouth with a scarf.  Sometimes laughing or crying can be a trigger.  Some medicines and food can trigger asthma.

## 2019-09-13 LAB
ANION GAP SERPL CALCULATED.3IONS-SCNC: 4 MMOL/L (ref 3–14)
BUN SERPL-MCNC: 29 MG/DL (ref 7–30)
CALCIUM SERPL-MCNC: 9.7 MG/DL (ref 8.5–10.1)
CHLORIDE SERPL-SCNC: 106 MMOL/L (ref 94–109)
CO2 SERPL-SCNC: 27 MMOL/L (ref 20–32)
CREAT SERPL-MCNC: 1.47 MG/DL (ref 0.66–1.25)
GFR SERPL CREATININE-BSD FRML MDRD: 54 ML/MIN/{1.73_M2}
GLUCOSE SERPL-MCNC: 88 MG/DL (ref 70–99)
POTASSIUM SERPL-SCNC: 4.5 MMOL/L (ref 3.4–5.3)
SODIUM SERPL-SCNC: 137 MMOL/L (ref 133–144)

## 2019-09-13 ASSESSMENT — ASTHMA QUESTIONNAIRES: ACT_TOTALSCORE: 25

## 2019-09-19 DIAGNOSIS — G35 MULTIPLE SCLEROSIS (H): Primary | ICD-10-CM

## 2019-09-20 DIAGNOSIS — Z01.818 PREOP GENERAL PHYSICAL EXAM: ICD-10-CM

## 2019-09-20 DIAGNOSIS — R79.89 ELEVATED SERUM CREATININE: ICD-10-CM

## 2019-09-20 DIAGNOSIS — G35 MULTIPLE SCLEROSIS (H): ICD-10-CM

## 2019-09-20 LAB
BASOPHILS # BLD AUTO: 0 10E9/L (ref 0–0.2)
BASOPHILS NFR BLD AUTO: 0.3 %
DIFFERENTIAL METHOD BLD: ABNORMAL
EOSINOPHIL # BLD AUTO: 0.4 10E9/L (ref 0–0.7)
EOSINOPHIL NFR BLD AUTO: 5.7 %
ERYTHROCYTE [DISTWIDTH] IN BLOOD BY AUTOMATED COUNT: 12.9 % (ref 10–15)
HCT VFR BLD AUTO: 36.9 % (ref 40–53)
HGB BLD-MCNC: 12.1 G/DL (ref 13.3–17.7)
LYMPHOCYTES # BLD AUTO: 1.5 10E9/L (ref 0.8–5.3)
LYMPHOCYTES NFR BLD AUTO: 19.6 %
MCH RBC QN AUTO: 28.9 PG (ref 26.5–33)
MCHC RBC AUTO-ENTMCNC: 32.8 G/DL (ref 31.5–36.5)
MCV RBC AUTO: 88 FL (ref 78–100)
MONOCYTES # BLD AUTO: 0.7 10E9/L (ref 0–1.3)
MONOCYTES NFR BLD AUTO: 9.6 %
NEUTROPHILS # BLD AUTO: 4.8 10E9/L (ref 1.6–8.3)
NEUTROPHILS NFR BLD AUTO: 64.8 %
PLATELET # BLD AUTO: 308 10E9/L (ref 150–450)
RBC # BLD AUTO: 4.19 10E12/L (ref 4.4–5.9)
WBC # BLD AUTO: 7.4 10E9/L (ref 4–11)

## 2019-09-20 PROCEDURE — 82306 VITAMIN D 25 HYDROXY: CPT | Performed by: PHYSICIAN ASSISTANT

## 2019-09-20 PROCEDURE — 80048 BASIC METABOLIC PNL TOTAL CA: CPT | Performed by: PHYSICIAN ASSISTANT

## 2019-09-20 PROCEDURE — 85025 COMPLETE CBC W/AUTO DIFF WBC: CPT | Performed by: PHYSICIAN ASSISTANT

## 2019-09-20 PROCEDURE — 80076 HEPATIC FUNCTION PANEL: CPT | Performed by: PHYSICIAN ASSISTANT

## 2019-09-20 PROCEDURE — 36415 COLL VENOUS BLD VENIPUNCTURE: CPT | Performed by: PHYSICIAN ASSISTANT

## 2019-09-21 LAB
ALBUMIN SERPL-MCNC: 3.6 G/DL (ref 3.4–5)
ALP SERPL-CCNC: 67 U/L (ref 40–150)
ALT SERPL W P-5'-P-CCNC: 33 U/L (ref 0–70)
ANION GAP SERPL CALCULATED.3IONS-SCNC: 6 MMOL/L (ref 3–14)
AST SERPL W P-5'-P-CCNC: 40 U/L (ref 0–45)
BILIRUB DIRECT SERPL-MCNC: 0.1 MG/DL (ref 0–0.2)
BILIRUB SERPL-MCNC: 0.6 MG/DL (ref 0.2–1.3)
BUN SERPL-MCNC: 26 MG/DL (ref 7–30)
CALCIUM SERPL-MCNC: 8.8 MG/DL (ref 8.5–10.1)
CHLORIDE SERPL-SCNC: 106 MMOL/L (ref 94–109)
CO2 SERPL-SCNC: 27 MMOL/L (ref 20–32)
CREAT SERPL-MCNC: 1.18 MG/DL (ref 0.66–1.25)
GFR SERPL CREATININE-BSD FRML MDRD: 70 ML/MIN/{1.73_M2}
GLUCOSE SERPL-MCNC: 125 MG/DL (ref 70–99)
POTASSIUM SERPL-SCNC: 3.8 MMOL/L (ref 3.4–5.3)
PROT SERPL-MCNC: 7 G/DL (ref 6.8–8.8)
SODIUM SERPL-SCNC: 139 MMOL/L (ref 133–144)

## 2019-09-24 LAB
DEPRECATED CALCIDIOL+CALCIFEROL SERPL-MC: <56 UG/L (ref 20–75)
VITAMIN D2 SERPL-MCNC: <5 UG/L
VITAMIN D3 SERPL-MCNC: 51 UG/L

## 2019-09-26 ENCOUNTER — TRANSFERRED RECORDS (OUTPATIENT)
Dept: HEALTH INFORMATION MANAGEMENT | Facility: CLINIC | Age: 54
End: 2019-09-26

## 2019-10-02 ENCOUNTER — TRANSFERRED RECORDS (OUTPATIENT)
Dept: HEALTH INFORMATION MANAGEMENT | Facility: CLINIC | Age: 54
End: 2019-10-02

## 2019-10-16 ENCOUNTER — TRANSFERRED RECORDS (OUTPATIENT)
Dept: HEALTH INFORMATION MANAGEMENT | Facility: CLINIC | Age: 54
End: 2019-10-16

## 2019-10-21 DIAGNOSIS — I10 HYPERTENSION GOAL BP (BLOOD PRESSURE) < 140/90: ICD-10-CM

## 2019-10-22 RX ORDER — LISINOPRIL/HYDROCHLOROTHIAZIDE 10-12.5 MG
TABLET ORAL
Qty: 90 TABLET | Refills: 0 | OUTPATIENT
Start: 2019-10-22

## 2019-10-22 NOTE — TELEPHONE ENCOUNTER
Message sent to pharmacy that refill available at [facility] pharmacy form 12/20/18 yr supply

## 2019-10-22 NOTE — TELEPHONE ENCOUNTER
"Requested Prescriptions   Pending Prescriptions Disp Refills     lisinopril-hydrochlorothiazide (PRINZIDE/ZESTORETIC) 10-12.5 MG tablet [Pharmacy Med Name: LISINOPRIL-HCTZ 10/12.5MG TABLETS] 90 tablet 0     Sig: TAKE 1 TABLET BY MOUTH DAILY   Last Written Prescription Date:  12/20/18  Last Fill Quantity: 90,  # refills: 3   Last office visit: 9/12/2019 with prescribing provider:  Donnell Arroyo PA-C    Future Office Visit:        Diuretics (Including Combos) Protocol Passed - 10/21/2019  5:29 PM        Passed - Blood pressure under 140/90 in past 12 months     BP Readings from Last 3 Encounters:   09/12/19 120/64   09/06/19 (!) 161/93   04/09/19 130/82                 Passed - Recent (12 mo) or future (30 days) visit within the authorizing provider's specialty     Patient has had an office visit with the authorizing provider or a provider within the authorizing providers department within the previous 12 mos or has a future within next 30 days. See \"Patient Info\" tab in inbasket, or \"Choose Columns\" in Meds & Orders section of the refill encounter.              Passed - Medication is active on med list        Passed - Patient is age 18 or older        Passed - Normal serum creatinine on file in past 12 months     Recent Labs   Lab Test 09/20/19  1404   CR 1.18              Passed - Normal serum potassium on file in past 12 months     Recent Labs   Lab Test 09/20/19  1404   POTASSIUM 3.8                    Passed - Normal serum sodium on file in past 12 months     Recent Labs   Lab Test 09/20/19  1404                  "

## 2019-10-24 ENCOUNTER — TRANSFERRED RECORDS (OUTPATIENT)
Dept: HEALTH INFORMATION MANAGEMENT | Facility: CLINIC | Age: 54
End: 2019-10-24

## 2019-10-28 DIAGNOSIS — I10 HYPERTENSION GOAL BP (BLOOD PRESSURE) < 140/90: ICD-10-CM

## 2019-10-28 DIAGNOSIS — J45.30 MILD PERSISTENT ASTHMA WITHOUT COMPLICATION: ICD-10-CM

## 2019-10-29 RX ORDER — MONTELUKAST SODIUM 10 MG/1
TABLET ORAL
Qty: 90 TABLET | Refills: 0 | Status: SHIPPED | OUTPATIENT
Start: 2019-10-29 | End: 2019-12-30

## 2019-10-29 RX ORDER — LISINOPRIL/HYDROCHLOROTHIAZIDE 10-12.5 MG
TABLET ORAL
Qty: 90 TABLET | Refills: 0 | Status: SHIPPED | OUTPATIENT
Start: 2019-10-29 | End: 2019-12-30

## 2019-10-29 NOTE — TELEPHONE ENCOUNTER
"Requested Prescriptions   Pending Prescriptions Disp Refills     montelukast (SINGULAIR) 10 MG tablet [Pharmacy Med Name: MONTELUKAST 10MG TABLETS] 90 tablet 0     Sig: TAKE 1 TABLET BY MOUTH AT BEDTIME   Last Written Prescription Date:  1/7/19  Last Fill Quantity: 90,  # refills: 2   Last office visit: 9/12/2019 with prescribing provider:  Donnell Arroyo PA-C   Future Office Visit:        Leukotriene Inhibitors Protocol Passed - 10/28/2019  5:06 PM        Passed - Patient is age 12 or older     If patient is under 16, ok to refill using age based dosing.           Passed - Asthma control assessment score within normal limits in last 6 months     Please review ACT score.   ACT Total Scores 7/18/2017 12/20/2018 9/12/2019   ACT TOTAL SCORE - - -   ASTHMA ER VISITS - - -   ASTHMA HOSPITALIZATIONS - - -   ACT TOTAL SCORE (Goal Greater than or Equal to 20) 25 23 25   In the past 12 months, how many times did you visit the emergency room for your asthma without being admitted to the hospital? 0 0 0   In the past 12 months, how many times were you hospitalized overnight because of your asthma? 0 0 0               Passed - Medication is active on med list        Passed - Recent (6 mo) or future (30 days) visit within the authorizing provider's specialty     Patient had office visit in the last 6 months or has a visit in the next 30 days with authorizing provider or within the authorizing provider's specialty.  See \"Patient Info\" tab in inbasket, or \"Choose Columns\" in Meds & Orders section of the refill encounter.            lisinopril-hydrochlorothiazide (PRINZIDE/ZESTORETIC) 10-12.5 MG tablet [Pharmacy Med Name: LISINOPRIL-HCTZ 10/12.5MG TABLETS] 90 tablet 0     Sig: TAKE 1 TABLET BY MOUTH DAILY   Last Written Prescription Date:  12/20/18  Last Fill Quantity: 90,  # refills: 3   Last office visit: 9/12/2019 with prescribing provider:  Donnell Arroyo PA-C   Future Office Visit:        Diuretics (Including " "Combos) Protocol Passed - 10/28/2019  5:06 PM        Passed - Blood pressure under 140/90 in past 12 months     BP Readings from Last 3 Encounters:   09/12/19 120/64   09/06/19 (!) 161/93   04/09/19 130/82                 Passed - Recent (12 mo) or future (30 days) visit within the authorizing provider's specialty     Patient has had an office visit with the authorizing provider or a provider within the authorizing providers department within the previous 12 mos or has a future within next 30 days. See \"Patient Info\" tab in inbasket, or \"Choose Columns\" in Meds & Orders section of the refill encounter.              Passed - Medication is active on med list        Passed - Patient is age 18 or older        Passed - Normal serum creatinine on file in past 12 months     Recent Labs   Lab Test 09/20/19  1404   CR 1.18              Passed - Normal serum potassium on file in past 12 months     Recent Labs   Lab Test 09/20/19  1404   POTASSIUM 3.8                    Passed - Normal serum sodium on file in past 12 months     Recent Labs   Lab Test 09/20/19  1404                  "

## 2019-10-30 ENCOUNTER — HEALTH MAINTENANCE LETTER (OUTPATIENT)
Age: 54
End: 2019-10-30

## 2019-12-12 ENCOUNTER — TRANSFERRED RECORDS (OUTPATIENT)
Dept: HEALTH INFORMATION MANAGEMENT | Facility: CLINIC | Age: 54
End: 2019-12-12

## 2019-12-27 NOTE — PROGRESS NOTES
SUBJECTIVE:   CC: Sterling Canales is an 54 year old male who presents for preventative health visit.     Healthy Habits:     Getting at least 3 servings of Calcium per day:  Yes    Bi-annual eye exam:  Yes    Dental care twice a year:  Yes    Sleep apnea or symptoms of sleep apnea:  Daytime drowsiness    Diet:  Low salt    Frequency of exercise:  None    Taking medications regularly:  Yes    Medication side effects:  None    PHQ-2 Total Score: 0    Additional concerns today:  No    Sterling Canales is a 54 year old male who presents today for annual physical  Feels well overall  Does note some increased urination in the evenings  Drinking a lot more fluids  Allergies/asthma well controlled       Today's PHQ-2 Score:   PHQ-2 ( 1999 [employer]) 12/30/2019   Q1: Little interest or pleasure in doing things 0   Q2: Feeling down, depressed or hopeless 0   PHQ-2 Score 0   Q1: Little interest or pleasure in doing things Not at all   Q2: Feeling down, depressed or hopeless Not at all   PHQ-2 Score 0       Abuse: Current or Past(Physical, Sexual or Emotional)- No  Do you feel safe in your environment? Yes      Social History     Tobacco Use     Smoking status: Never Smoker     Smokeless tobacco: Never Used   Substance Use Topics     Alcohol use: Yes     Alcohol/week: 0.0 - 1.7 standard drinks     Comment: rarely         Alcohol Use 12/30/2019   Prescreen: >3 drinks/day or >7 drinks/week? No   Prescreen: >3 drinks/day or >7 drinks/week? -       Last PSA:   PSA   Date Value Ref Range Status   12/29/2017 1.08 0 - 4 ug/L Final     Comment:     Assay Method:  Chemiluminescence using Siemens Vista analyzer       Reviewed orders with patient. Reviewed health maintenance and updated orders accordingly - Yes  Lab work is in process    Reviewed and updated as needed this visit by clinical staff  Tobacco  Allergies  Meds  Med Hx  Surg Hx  Fam Hx  Soc Hx        Reviewed and updated as needed this visit by Provider            Review of  "Systems   Constitutional: Negative for chills and fever.   HENT: Negative for congestion, ear pain, hearing loss and sore throat.    Eyes: Negative for pain and visual disturbance.   Respiratory: Negative for cough and shortness of breath.    Cardiovascular: Positive for peripheral edema. Negative for chest pain and palpitations.   Gastrointestinal: Negative for abdominal pain, constipation, diarrhea, heartburn, hematochezia and nausea.   Genitourinary: Positive for frequency. Negative for discharge, dysuria, genital sores, hematuria, impotence and urgency.   Musculoskeletal: Positive for myalgias. Negative for arthralgias and joint swelling.   Skin: Negative for rash.   Neurological: Positive for headaches. Negative for dizziness, weakness and paresthesias.   Psychiatric/Behavioral: Negative for mood changes. The patient is not nervous/anxious.        OBJECTIVE:   /70   Pulse 89   Temp 97.1  F (36.2  C) (Tympanic)   Resp 16   Ht 1.753 m (5' 9\")   Wt 85 kg (187 lb 6.4 oz)   SpO2 97%   BMI 27.67 kg/m      Physical Exam  GENERAL: healthy, alert and no distress  EYES: Eyes grossly normal to inspection, PERRL and conjunctivae and sclerae normal  HENT: ear canals and TM's normal, nose and mouth without ulcers or lesions  NECK: no adenopathy, no asymmetry, masses, or scars and thyroid normal to palpation  RESP: lungs clear to auscultation - no rales, rhonchi or wheezes  CV: regular rate and rhythm, normal S1 S2, no S3 or S4, no murmur, click or rub, no peripheral edema and peripheral pulses strong  ABDOMEN: soft, nontender, no hepatosplenomegaly, no masses and bowel sounds normal   (male): normal male genitalia without lesions or urethral discharge, no hernia  MS: no gross musculoskeletal defects noted, no edema; surgical scar s/p distal biceps repair well healed  SKIN: no suspicious lesions or rashes  NEURO: Normal strength and tone, mentation intact and speech normal  PSYCH: mentation appears normal, " "affect normal/bright    Diagnostic Test Results:  Labs reviewed in Epic    ASSESSMENT/PLAN:   1. Routine general medical examination at a health care facility  Reviewed personal and family history. Reviewed age appropriate screenings. Recommended any needed vaccinations.  - Lipid panel reflex to direct LDL Fasting; Future    2. Low hemoglobin  Rescreen. Likely 2/2 surgery  - CBC with platelets; Future    3. Hypertension goal BP (blood pressure) < 140/90  controlled  - lisinopril-hydrochlorothiazide (PRINZIDE/ZESTORETIC) 10-12.5 MG tablet; Take 1 tablet by mouth daily  Dispense: 90 tablet; Refill: 3    4. Mild persistent asthma without complication  Controlled.   - montelukast (SINGULAIR) 10 MG tablet; Take 1 tablet (10 mg) by mouth At Bedtime  Dispense: 90 tablet; Refill: 3  - albuterol (PROVENTIL HFA) 108 (90 Base) MCG/ACT inhaler; Inhale 1-2 puffs into the lungs every 6 hours as needed for shortness of breath / dyspnea  Dispense: 2 Inhaler; Refill: 1    5. Eczema, unspecified type  - triamcinolone (KENALOG) 0.1 % external ointment; Apply topically 2 times daily  Dispense: 30 g; Refill: 0    6. Screening for prostate cancer  - **Prostate spec antigen screen FUTURE 1yr; Future    7. Increased frequency of urination  Will update PSA. I think more likely related to fluid intake  - **Prostate spec antigen screen FUTURE 1yr; Future    COUNSELING:   Reviewed preventive health counseling, as reflected in patient instructions    Estimated body mass index is 27.67 kg/m  as calculated from the following:    Height as of this encounter: 1.753 m (5' 9\").    Weight as of this encounter: 85 kg (187 lb 6.4 oz).     Weight management plan: Discussed healthy diet and exercise guidelines     reports that he has never smoked. He has never used smokeless tobacco.      Counseling Resources:  ATP IV Guidelines  Pooled Cohorts Equation Calculator  FRAX Risk Assessment  ICSI Preventive Guidelines  Dietary Guidelines for Americans, " 2010  USDA's MyPlate  ASA Prophylaxis  Lung CA Screening    Donnell Arroyo PA-C  [facility]

## 2019-12-30 ENCOUNTER — OFFICE VISIT (OUTPATIENT)
Dept: FAMILY MEDICINE | Facility: CLINIC | Age: 54
End: 2019-12-30
Payer: COMMERCIAL

## 2019-12-30 VITALS
OXYGEN SATURATION: 97 % | DIASTOLIC BLOOD PRESSURE: 70 MMHG | TEMPERATURE: 97.1 F | HEART RATE: 89 BPM | BODY MASS INDEX: 27.76 KG/M2 | SYSTOLIC BLOOD PRESSURE: 130 MMHG | WEIGHT: 187.4 LBS | RESPIRATION RATE: 16 BRPM | HEIGHT: 69 IN

## 2019-12-30 DIAGNOSIS — J45.30 MILD PERSISTENT ASTHMA WITHOUT COMPLICATION: ICD-10-CM

## 2019-12-30 DIAGNOSIS — Z12.5 SCREENING FOR PROSTATE CANCER: ICD-10-CM

## 2019-12-30 DIAGNOSIS — I10 HYPERTENSION GOAL BP (BLOOD PRESSURE) < 140/90: ICD-10-CM

## 2019-12-30 DIAGNOSIS — L30.9 ECZEMA, UNSPECIFIED TYPE: ICD-10-CM

## 2019-12-30 DIAGNOSIS — Z00.00 ROUTINE GENERAL MEDICAL EXAMINATION AT A HEALTH CARE FACILITY: Primary | ICD-10-CM

## 2019-12-30 DIAGNOSIS — R35.0 INCREASED FREQUENCY OF URINATION: ICD-10-CM

## 2019-12-30 DIAGNOSIS — D64.9 LOW HEMOGLOBIN: ICD-10-CM

## 2019-12-30 PROCEDURE — 99396 PREV VISIT EST AGE 40-64: CPT | Performed by: PHYSICIAN ASSISTANT

## 2019-12-30 PROCEDURE — 99213 OFFICE O/P EST LOW 20 MIN: CPT | Mod: 25 | Performed by: PHYSICIAN ASSISTANT

## 2019-12-30 RX ORDER — LISINOPRIL/HYDROCHLOROTHIAZIDE 10-12.5 MG
1 TABLET ORAL DAILY
Qty: 90 TABLET | Refills: 3 | Status: SHIPPED | OUTPATIENT
Start: 2019-12-30 | End: 2020-12-31

## 2019-12-30 RX ORDER — TRIAMCINOLONE ACETONIDE 1 MG/G
OINTMENT TOPICAL 2 TIMES DAILY
Qty: 30 G | Refills: 0 | Status: SHIPPED | OUTPATIENT
Start: 2019-12-30 | End: 2020-12-31

## 2019-12-30 RX ORDER — MONTELUKAST SODIUM 10 MG/1
1 TABLET ORAL AT BEDTIME
Qty: 90 TABLET | Refills: 3 | Status: SHIPPED | OUTPATIENT
Start: 2019-12-30 | End: 2020-01-21

## 2019-12-30 RX ORDER — ALBUTEROL SULFATE 90 UG/1
1-2 AEROSOL, METERED RESPIRATORY (INHALATION) EVERY 6 HOURS PRN
Qty: 2 INHALER | Refills: 1 | Status: SHIPPED | OUTPATIENT
Start: 2019-12-30 | End: 2020-12-31

## 2019-12-30 ASSESSMENT — ENCOUNTER SYMPTOMS
DYSURIA: 0
CONSTIPATION: 0
DIARRHEA: 0
EYE PAIN: 0
HEMATOCHEZIA: 0
HEARTBURN: 0
NERVOUS/ANXIOUS: 0
FEVER: 0
SORE THROAT: 0
PALPITATIONS: 0
MYALGIAS: 1
COUGH: 0
ARTHRALGIAS: 0
HEMATURIA: 0
JOINT SWELLING: 0
DIZZINESS: 0
CHILLS: 0
FREQUENCY: 1
SHORTNESS OF BREATH: 0
WEAKNESS: 0
PARESTHESIAS: 0
NAUSEA: 0
HEADACHES: 1
ABDOMINAL PAIN: 0

## 2019-12-30 ASSESSMENT — MIFFLIN-ST. JEOR: SCORE: 1690.42

## 2019-12-30 NOTE — LETTER
"My Asthma Action Plan    Name: Sterling Canales   YOB: 1965  Date: 12/30/2019   My doctor: Donnell Arroyo PA-C   My clinic: [facility]        My Control Medicine: { :842679}  My Rescue Medicine: { :815843::\"Albuterol (Proair/Ventolin/Proventil HFA) 2-4 puffs EVERY 4 HOURS as needed. Use a spacer if recommended by your provider.\"}  {AAP include Oral Steroid (Optional) :841589} My Asthma Severity:   { :874958}  Know your asthma triggers: { :533893}               GREEN ZONE   Good Control    I feel good    No cough or wheeze    Can work, sleep and play without asthma symptoms       Take your asthma control medicine every day.     1. If exercise triggers your asthma, take your rescue medication    15 minutes before exercise or sports, and    During exercise if you have asthma symptoms  2. Spacer to use with inhaler: If you have a spacer, make sure to use it with your inhaler             YELLOW ZONE Getting Worse  I have ANY of these:    I do not feel good    Cough or wheeze    Chest feels tight    Wake up at night   1. Keep taking your Green Zone medications  2. Start taking your rescue medicine:    every 20 minutes for up to 1 hour. Then every 4 hours for 24-48 hours.  3. If you stay in the Yellow Zone for more than 12-24 hours, contact your doctor.  4. If you do not return to the Green Zone in 12-24 hours or you get worse, start taking your oral steroid medicine if prescribed by your provider.           RED ZONE Medical Alert - Get Help  I have ANY of these:    I feel awful    Medicine is not helping    Breathing getting harder    Trouble walking or talking    Nose opens wide to breathe       1. Take your rescue medicine NOW  2. If your provider has prescribed an oral steroid medicine, start taking it NOW  3. Call your doctor NOW  4. If you are still in the Red Zone after 20 minutes and you have not reached your doctor:    Take your rescue medicine again and    Call 911 or go to " the emergency room right away    See your regular doctor within 2 weeks of an Emergency Room or Urgent Care visit for follow-up treatment.          Annual Reminders:  Meet with Asthma Educator,  Flu Shot in the Fall, consider Pneumonia Vaccination for patients with asthma (aged 19 and older).    Pharmacy:    [facility]  [facility] - [place], [place] - [address] [id number]  [facility] [place] [facility] [address] [facility] AT [place] [address] & [facility]    Electronically signed by Donnell Arroyo PA-C   Date: 12/30/19                      Asthma Triggers  How To Control Things That Make Your Asthma Worse    Triggers are things that make your asthma worse.  Look at the list below to help you find your triggers and what you can do about them.  You can help prevent asthma flare-ups by staying away from your triggers.      Trigger                                                          What you can do   Cigarette Smoke  Tobacco smoke can make asthma worse. Do not allow smoking in your home, car or around you.  Be sure no one smokes at a child s day care or school.  If you smoke, ask your health care provider for ways to help you quit.  Ask family members to quit too.  Ask your health care provider for a referral to Quit Plan to help you quit smoking, or call 1-721-208-PLAN.     Colds, Flu, Bronchitis  These are common triggers of asthma. Wash your hands often.  Don t touch your eyes, nose or mouth.  Get a flu shot every year.     Dust Mites  These are tiny bugs that live in cloth or carpet. They are too small to see. Wash sheets and blankets in hot water every week.   Encase pillows and mattress in dust mite proof covers.  Avoid having carpet if you can. If you have carpet, vacuum weekly.   Use a dust mask and HEPA vacuum.   Pollen and Outdoor Mold  Some people are allergic to trees, grass, or weed pollen, or molds. Try to keep  your windows closed.  Limit time out doors when pollen count is high.   Ask you health care provider about taking medicine during allergy season.     Animal Dander  Some people are allergic to skin flakes, urine or saliva from pets with fur or feathers. Keep pets with fur or feathers out of your home.    If you can t keep the pet outdoors, then keep the pet out of your bedroom.  Keep the bedroom door closed.  Keep pets off cloth furniture and away from stuffed toys.     Mice, Rats, and Cockroaches   Some people are allergic to the waste from these pests.   Cover food and garbage.  Clean up spills and food crumbs.  Store grease in the refrigerator.   Keep food out of the bedroom.   Indoor Mold  This can be a trigger if your home has high moisture. Fix leaking faucets, pipes, or other sources of water.   Clean moldy surfaces.  Dehumidify basement if it is damp and smelly.   Smoke, Strong Odors, and Sprays  These can reduce air quality. Stay away from strong odors and sprays, such as perfume, powder, hair spray, paints, smoke incense, paint, cleaning products, candles and new carpet.   Exercise or Sports  Some people with asthma have this trigger. Be active!  Ask your doctor about taking medicine before sports or exercise to prevent symptoms.    Warm up for 5-10 minutes before and after sports or exercise.     Other Triggers of Asthma  Cold air:  Cover your nose and mouth with a scarf.  Sometimes laughing or crying can be a trigger.  Some medicines and food can trigger asthma.

## 2020-01-01 ENCOUNTER — MYC MEDICAL ADVICE (OUTPATIENT)
Dept: FAMILY MEDICINE | Facility: CLINIC | Age: 55
End: 2020-01-01

## 2020-01-02 NOTE — TELEPHONE ENCOUNTER
Last seen 12/30/19 for general physical, at which time was positive for urinary frequency. Labs futured: PSA, lipids, CBC.    Called patient triage. Has lab scheduled for 1/7/19 at 6 p.m. No answer. LVM to return call.    Also sent MyC response.    Harini EDMOND, Triage RN

## 2020-01-07 DIAGNOSIS — D64.9 LOW HEMOGLOBIN: ICD-10-CM

## 2020-01-07 DIAGNOSIS — Z12.5 SCREENING FOR PROSTATE CANCER: ICD-10-CM

## 2020-01-07 DIAGNOSIS — R35.0 INCREASED FREQUENCY OF URINATION: ICD-10-CM

## 2020-01-07 DIAGNOSIS — Z00.00 ROUTINE GENERAL MEDICAL EXAMINATION AT A HEALTH CARE FACILITY: ICD-10-CM

## 2020-01-07 LAB
ERYTHROCYTE [DISTWIDTH] IN BLOOD BY AUTOMATED COUNT: 12.2 % (ref 10–15)
HCT VFR BLD AUTO: 40.9 % (ref 40–53)
HGB BLD-MCNC: 13.4 G/DL (ref 13.3–17.7)
MCH RBC QN AUTO: 28.8 PG (ref 26.5–33)
MCHC RBC AUTO-ENTMCNC: 32.8 G/DL (ref 31.5–36.5)
MCV RBC AUTO: 88 FL (ref 78–100)
PLATELET # BLD AUTO: 214 10E9/L (ref 150–450)
RBC # BLD AUTO: 4.66 10E12/L (ref 4.4–5.9)
WBC # BLD AUTO: 8.3 10E9/L (ref 4–11)

## 2020-01-07 PROCEDURE — 85027 COMPLETE CBC AUTOMATED: CPT | Performed by: PHYSICIAN ASSISTANT

## 2020-01-07 PROCEDURE — 80061 LIPID PANEL: CPT | Performed by: PHYSICIAN ASSISTANT

## 2020-01-07 PROCEDURE — G0103 PSA SCREENING: HCPCS | Performed by: PHYSICIAN ASSISTANT

## 2020-01-07 PROCEDURE — 36415 COLL VENOUS BLD VENIPUNCTURE: CPT | Performed by: PHYSICIAN ASSISTANT

## 2020-01-08 LAB
CHOLEST SERPL-MCNC: 187 MG/DL
HDLC SERPL-MCNC: 42 MG/DL
LDLC SERPL CALC-MCNC: 108 MG/DL
NONHDLC SERPL-MCNC: 145 MG/DL
PSA SERPL-ACNC: 1.06 UG/L (ref 0–4)
TRIGL SERPL-MCNC: 187 MG/DL

## 2020-01-21 DIAGNOSIS — J45.30 MILD PERSISTENT ASTHMA WITHOUT COMPLICATION: ICD-10-CM

## 2020-01-21 RX ORDER — MONTELUKAST SODIUM 10 MG/1
TABLET ORAL
Qty: 90 TABLET | Refills: 3 | Status: SHIPPED | OUTPATIENT
Start: 2020-01-21 | End: 2020-12-31

## 2020-01-21 NOTE — TELEPHONE ENCOUNTER
"Lola is now requesting.    Requested Prescriptions   Pending Prescriptions Disp Refills     montelukast (SINGULAIR) 10 MG tablet [Pharmacy Med Name: MONTELUKAST 10MG TABLETS] 90 tablet 3     Sig: TAKE 1 TABLET BY MOUTH AT BEDTIME   Last Written Prescription Date:  12/30/19  Last Fill Quantity: 90,  # refills: 3   Last office visit: 12/30/2019 with prescribing provider:  Donnell Arroyo PA-C    Future Office Visit:        Leukotriene Inhibitors Protocol Passed - 1/21/2020  5:29 AM        Passed - Patient is age 12 or older     If patient is under 16, ok to refill using age based dosing.           Passed - Asthma control assessment score within normal limits in last 6 months     Please review ACT score.   ACT Total Scores 7/18/2017 12/20/2018 9/12/2019   ACT TOTAL SCORE - - -   ASTHMA ER VISITS - - -   ASTHMA HOSPITALIZATIONS - - -   ACT TOTAL SCORE (Goal Greater than or Equal to 20) 25 23 25   In the past 12 months, how many times did you visit the emergency room for your asthma without being admitted to the hospital? 0 0 0   In the past 12 months, how many times were you hospitalized overnight because of your asthma? 0 0 0             Passed - Medication is active on med list        Passed - Recent (6 mo) or future (30 days) visit within the authorizing provider's specialty     Patient had office visit in the last 6 months or has a visit in the next 30 days with authorizing provider or within the authorizing provider's specialty.  See \"Patient Info\" tab in inbasket, or \"Choose Columns\" in Meds & Orders section of the refill encounter.             "

## 2020-03-12 ENCOUNTER — TRANSFERRED RECORDS (OUTPATIENT)
Dept: HEALTH INFORMATION MANAGEMENT | Facility: CLINIC | Age: 55
End: 2020-03-12

## 2020-03-18 ENCOUNTER — E-VISIT (OUTPATIENT)
Dept: FAMILY MEDICINE | Facility: CLINIC | Age: 55
End: 2020-03-18
Payer: COMMERCIAL

## 2020-03-18 ENCOUNTER — NURSE TRIAGE (OUTPATIENT)
Dept: FAMILY MEDICINE | Facility: CLINIC | Age: 55
End: 2020-03-18

## 2020-03-18 DIAGNOSIS — R19.7 DIARRHEA, UNSPECIFIED TYPE: Primary | ICD-10-CM

## 2020-03-18 PROCEDURE — 99421 OL DIG E/M SVC 5-10 MIN: CPT | Performed by: PHYSICIAN ASSISTANT

## 2020-03-18 NOTE — TELEPHONE ENCOUNTER
Reason for call:  Patient reporting a symptom    Symptom or request: diarrhea x 6 days    Duration (how long have symptoms been present): x 6 days    Have you been treated for this before? No    Additional comments: no fever, some nausea today  Travel to [place] 1st week of March    Phone Number patient can be reached at:  Home number on file [phone] (home)    Best Time:  Any  Please advise.    Can we leave a detailed message on this number:  Not Applicable    Call taken on 3/18/2020 at 10:46 AM by Cait Jennings

## 2020-03-18 NOTE — TELEPHONE ENCOUNTER
Called the pt to discuss.    He has had diarrhea off and on since Thursday.  He is missing work today due to nausea although he says nausea now better.  No fever.  He has had the diarrhea twice today.  Thursday it was really bad. He missed work Monday, back to work yesterday.  The whole time his stomach is gurgling a lot.  No ab pain.  He has lots of gas.  He is weak, fatigued and kind of light headed. Advised to drink lots of fluids including gatorades and water.  Pt has hx of MS, asthma, and HTN.  Advised to do an e-visit.  He was having a little trouble with mychart. Gave number to mychart specialists.  Advised if he is not able to get that figured out to call back for a phone visit tomorrow.      Additional Information    Negative: Shock suspected (e.g., cold/pale/clammy skin, too weak to stand, low BP, rapid pulse)    Negative: Difficult to awaken or acting confused (e.g., disoriented, slurred speech)    Negative: Sounds like a life-threatening emergency to the triager    Negative: Vomiting also present and worse than the diarrhea    Negative: Blood in stool and without diarrhea    Negative: SEVERE abdominal pain (e.g., excruciating) and present > 1 hour    Negative: SEVERE abdominal pain and age > 60    Negative: Bloody, black, or tarry bowel movements    Negative: SEVERE diarrhea (e.g., 7 or more times / day more than normal) and age > 60 years    Negative: Constant abdominal pain lasting > 2 hours    Negative: Drinking very little and has signs of dehydration (e.g., no urine > 12 hours, very dry mouth, very lightheaded)    Negative: Patient sounds very sick or weak to the triager    Negative: SEVERE diarrhea (e.g., 7 or more times / day more than normal) and present > 24 hours (1 day)    Negative: MODERATE diarrhea (e.g., 4-6 times / day more than normal) and present > 48 hours (2 days)    Negative: MODERATE diarrhea (e.g., 4-6 times / day more than normal) and age > 70 years    Negative: Abdominal pain   "(Exception: pain clears completely with each passage of diarrhea stool)    Negative: Fever > 101 F (38.3 C)    Negative: Blood in the stool    Negative: Mucus or pus in stool has been present > 2 days and diarrhea is more than mild    Negative: Weak immune system (e.g., HIV positive, cancer chemo, splenectomy, organ transplant, chronic steroids)    Negative: Travel to a foreign country in past month    Negative: Recent antibiotic therapy (i.e., within last 2 months) and diarrhea present > 3 days since antibiotic was stopped    Negative: Recent hospitalization and diarrhea present > 3 days    Negative: Tube feedings (e.g., nasogastric, g-tube, j-tube)    Answer Assessment - Initial Assessment Questions  1. DIARRHEA SEVERITY: \"How bad is the diarrhea?\" \"How many extra stools have you had in the past 24 hours than normal?\"     - MILD: Few loose or mushy BMs; increase of 1-3 stools over normal daily number of stools; mild increase in ostomy output.    - MODERATE: Increase of 4-6 stools daily over normal; moderate increase in ostomy output.    - SEVERE (or Worst Possible): Increase of 7 or more stools daily over normal; moderate increase in ostomy output; incontinence.      2 today - yesterday 2   2. ONSET: \"When did the diarrhea begin?\"       Thursday  3. BM CONSISTENCY: \"How loose or watery is the diarrhea?\"       Very - it is like pouring out water  4. VOMITING: \"Are you also vomiting?\" If so, ask: \"How many times in the past 24 hours?\"       Nausea today - no vomiting, nausea is subsiding  5. ABDOMINAL PAIN: \"Are you having any abdominal pain?\" If yes: \"What does it feel like?\" (e.g., crampy, dull, intermittent, constant)       No ab pain  6. ABDOMINAL PAIN SEVERITY: If present, ask: \"How bad is the pain?\"  (e.g., Scale 1-10; mild, moderate, or severe)     - MILD (1-3): doesn't interfere with normal activities, abdomen soft and not tender to touch      - MODERATE (4-7): interferes with normal activities or awakens " "from sleep, tender to touch      - SEVERE (8-10): excruciating pain, doubled over, unable to do any normal activities        None   7. ORAL INTAKE: If vomiting, \"Have you been able to drink liquids?\" \"How much fluids have you had in the past 24 hours?\"      He is drinking green tea and power aid and gatorade  8. HYDRATION: \"Any signs of dehydration?\" (e.g., dry mouth [not just dry lips], too weak to stand, dizziness, new weight loss) \"When did you last urinate?\"      He is weak and fatigue and kind of light headed, he last urinated 10 minutes ago - advised to drink lots of fluids, maybe hold off of tea - drink lots of gatorade or water    9. EXPOSURE: \"Have you traveled to a foreign country recently?\" \"Have you been exposed to anyone with diarrhea?\" \"Could you have eaten any food that was spoiled?\"      No, [place] the first week of March - no spoiled food   10. ANTIBIOTIC USE: \"Are you taking antibiotics now or have you taken antibiotics in the past 2 months?\"        no  11. OTHER SYMPTOMS: \"Do you have any other symptoms?\" (e.g., fever, blood in stool)        No fever no blood in the stool  12. PREGNANCY: \"Is there any chance you are pregnant?\" \"When was your last menstrual period?\"        n/a    Protocols used: DIARRHEA-A-OH      "

## 2020-03-22 ENCOUNTER — MYC MEDICAL ADVICE (OUTPATIENT)
Dept: FAMILY MEDICINE | Facility: CLINIC | Age: 55
End: 2020-03-22

## 2020-03-23 NOTE — TELEPHONE ENCOUNTER
Discussed with patient. Symptoms somewhat improved. Last BM today was 6am but still somewhat watery. Doing a mostly liquid diet (water, gatorade and soup) plus pepto bismol. Denies stomach pain, fever, blood in stool, vomiting, HA, chest pain, cough or shortness of breath. Will recommend continued supportive cares and follow up note if not improving.

## 2020-03-28 ENCOUNTER — MYC MEDICAL ADVICE (OUTPATIENT)
Dept: FAMILY MEDICINE | Facility: CLINIC | Age: 55
End: 2020-03-28

## 2020-03-30 NOTE — TELEPHONE ENCOUNTER
Please set him up to see someone. This is a long course of symptoms and dont want him to wait until I am in next week.

## 2020-03-31 ENCOUNTER — OFFICE VISIT (OUTPATIENT)
Dept: FAMILY MEDICINE | Facility: CLINIC | Age: 55
End: 2020-03-31
Payer: COMMERCIAL

## 2020-03-31 VITALS
BODY MASS INDEX: 26.66 KG/M2 | WEIGHT: 180 LBS | DIASTOLIC BLOOD PRESSURE: 68 MMHG | OXYGEN SATURATION: 97 % | SYSTOLIC BLOOD PRESSURE: 118 MMHG | HEART RATE: 95 BPM | RESPIRATION RATE: 16 BRPM | HEIGHT: 69 IN | TEMPERATURE: 98.2 F

## 2020-03-31 DIAGNOSIS — R19.7 DIARRHEA, UNSPECIFIED TYPE: Primary | ICD-10-CM

## 2020-03-31 DIAGNOSIS — G35 MS (MULTIPLE SCLEROSIS) (H): ICD-10-CM

## 2020-03-31 DIAGNOSIS — R14.3 FLATULENCE: ICD-10-CM

## 2020-03-31 LAB
ERYTHROCYTE [DISTWIDTH] IN BLOOD BY AUTOMATED COUNT: 12.7 % (ref 10–15)
HCT VFR BLD AUTO: 38 % (ref 40–53)
HGB BLD-MCNC: 12.2 G/DL (ref 13.3–17.7)
MCH RBC QN AUTO: 28.1 PG (ref 26.5–33)
MCHC RBC AUTO-ENTMCNC: 32.1 G/DL (ref 31.5–36.5)
MCV RBC AUTO: 88 FL (ref 78–100)
PLATELET # BLD AUTO: 296 10E9/L (ref 150–450)
RBC # BLD AUTO: 4.34 10E12/L (ref 4.4–5.9)
WBC # BLD AUTO: 9.7 10E9/L (ref 4–11)

## 2020-03-31 PROCEDURE — 85027 COMPLETE CBC AUTOMATED: CPT | Performed by: FAMILY MEDICINE

## 2020-03-31 PROCEDURE — 99214 OFFICE O/P EST MOD 30 MIN: CPT | Performed by: FAMILY MEDICINE

## 2020-03-31 PROCEDURE — 87329 GIARDIA AG IA: CPT | Performed by: FAMILY MEDICINE

## 2020-03-31 PROCEDURE — 87328 CRYPTOSPORIDIUM AG IA: CPT | Performed by: FAMILY MEDICINE

## 2020-03-31 PROCEDURE — 36415 COLL VENOUS BLD VENIPUNCTURE: CPT | Performed by: FAMILY MEDICINE

## 2020-03-31 PROCEDURE — 87506 IADNA-DNA/RNA PROBE TQ 6-11: CPT | Performed by: FAMILY MEDICINE

## 2020-03-31 PROCEDURE — 80053 COMPREHEN METABOLIC PANEL: CPT | Performed by: FAMILY MEDICINE

## 2020-03-31 ASSESSMENT — MIFFLIN-ST. JEOR: SCORE: 1651.85

## 2020-03-31 NOTE — TELEPHONE ENCOUNTER
Called the pt to set up an appt.      Pt says no fever, no breathing issues, no coughing.  No sore throat.      Appt was scheduled.

## 2020-03-31 NOTE — PROGRESS NOTES
Subjective     Sterling Canales is a 55 year old male who presents to clinic today for the following health issues:    HPI   Diarrhea      Duration: x 3 weeks     Description:       Consistency of stool: loose and mucousy       Blood in stool: no        Number of loose stools past 24 hours: 2    Intensity:  moderate    Accompanying signs and symptoms:       Fever: no        Nausea/vomitting: no        Abdominal pain: no        Weight loss: YES- 7 lbs    History (recent antibiotics or travel/ill contacts/med changes/testing done): travel to [facility] x 1 month ago     Precipitating or alleviating factors: None    Therapies tried and outcome: Imodium AD, PeptoBismol and Citracel/Metamucil- effective for frequency of stools         See under ROS    Patient Active Problem List   Diagnosis     Mild persistent asthma     Allergic rhinitis     Carpal tunnel syndrome     Malaise and fatigue     MS (multiple sclerosis) (H)     Headache     CARDIOVASCULAR SCREENING; LDL GOAL LESS THAN 160     Hypertension goal BP (blood pressure) < 140/90     Fever, unknown origin       Current Outpatient Medications   Medication Sig Dispense Refill     albuterol (PROVENTIL HFA) 108 (90 Base) MCG/ACT inhaler Inhale 1-2 puffs into the lungs every 6 hours as needed for shortness of breath / dyspnea 2 Inhaler 1     Ascorbic Acid (VITAMIN C CR) 1000 MG TBCR Take  by mouth.       cholecalciferol (VITAMIN D) 1000 UNIT tablet Take 2 tablets (2,000 Units) by mouth daily 100 tablet 3     gabapentin (NEURONTIN) 100 MG capsule Take 1 capsule (100 mg) by mouth daily (Patient taking differently: Take 100 mg by mouth as needed ) 90 capsule 0     lisinopril-hydrochlorothiazide (PRINZIDE/ZESTORETIC) 10-12.5 MG tablet Take 1 tablet by mouth daily 90 tablet 3     montelukast (SINGULAIR) 10 MG tablet TAKE 1 TABLET BY MOUTH AT BEDTIME 90 tablet 3     Multiple Vitamin (ONE-A-DAY ESSENTIAL PO) Take  by mouth.       teriflunomide (AUBAGIO) 14 MG tablet Take 14 mg by  "mouth daily Only available through select specialty pharmacies       triamcinolone (KENALOG) 0.1 % external ointment Apply topically 2 times daily 30 g 0     verapamil HCl  MG CP24 Take 360 mg by mouth daily 30 capsule 5     SUMAtriptan (IMITREX) 25 MG tablet Take 1-2 tablets (25-50 mg) by mouth at onset of headache for migraine May repeat in 2 hours. Max 8 tablets/24 hours. (Patient not taking: Reported on 3/31/2020) 9 tablet 1           Reviewed and updated as needed this visit by Provider         Review of Systems   ROS COMP: CONSTITUTIONAL:NEGATIVE for fever, chills. He does note he lost weight in the last couple weeks with this.   RESP:NEGATIVE for significant cough or SOB  CV: NEGATIVE for chest pain, palpitations or peripheral edema  GI: see below.  : negative for dysuria, hematuria  NEURO: NEGATIVE for weakness, dizziness  PSYCHIATRIC: NEGATIVE for changes in mood or affect    Overall feeling well. Notes this is odd for him due to the length of time it has persisted.   His usual diarrheal illness lasts a couple days.   Diarrhea for the third week.  Now down to 2 stools per day. Using pepto bismol.  Was more frequent and urgent in the beginning.    Early March, was in [place].  He was back for a week or so prior to onset of symptoms.  No anibiotics recently.     No blood in the stool.   Started watery and explosive. Now a little thicker; more like jelly.  Taking some metamucil; so things have improved. Some mucus.   Darker stool now with pepto. Otherwise light brown.   Lots of gas; some odor.     Lots of gurgling noises more at night.   Minimal pain; some with gas, but relieved as he releases it.     Working on fluids. Adult pedialyte.       Objective    /68 (BP Location: Right arm, Cuff Size: Adult Regular)   Pulse 95   Temp 98.2  F (36.8  C) (Oral)   Resp 16   Ht 1.753 m (5' 9\")   Wt 81.6 kg (180 lb)   SpO2 97%   BMI 26.58 kg/m    Body mass index is 26.58 kg/m .  Physical Exam "   GENERAL APPEARANCE: alert and no distress  HENT: ear canals and TM's normal and nose and mouth without ulcers or lesions.   Mucus membranes moist.  RESP: lungs clear to auscultation - no rales, rhonchi or wheezes  CV: regular rates and rhythm  BACK: No CVAT.  ABDOMEN: soft, nontender, without hepatosplenomegaly or masses and bowel sounds normal  PSYCH: mentation appears normal and affect normal/bright          Assessment & Plan     1. Diarrhea, unspecified type  Uncertain etiology.  It has become prolonged.   Overall, he looks adequately hydrated. Will check metabolic parameters. Will look for possible etiology that we can treat.   Reviewed some of what he has been working with over virtual visits regarding diet, metamucil, medications, etc.   Might need to consider GI if persists and no etiology found.   - Comprehensive metabolic panel  - Enteric Bacteria and Virus Panel by [facility]; Future  - Clostridium difficile Toxin B PCR; Future  - CBC with platelets  - Cryptosporidium/Giardia Immunoassay; Future    2. Flatulence  As above.  He notes he has taken some PPI intermittently to help with this.   Discussed occasional loose stool with PPI and at this time, suspect it probably is not helping with gas.     3. MS (multiple sclerosis) (H)  No new medications for some time. Notes his current one works well for him.        No follow-ups on file.    Swathi Maldonado MD, MD  [facility]

## 2020-04-01 DIAGNOSIS — R19.7 DIARRHEA, UNSPECIFIED TYPE: ICD-10-CM

## 2020-04-01 LAB
ALBUMIN SERPL-MCNC: 3.6 G/DL (ref 3.4–5)
ALP SERPL-CCNC: 63 U/L (ref 40–150)
ALT SERPL W P-5'-P-CCNC: 21 U/L (ref 0–70)
ANION GAP SERPL CALCULATED.3IONS-SCNC: 5 MMOL/L (ref 3–14)
AST SERPL W P-5'-P-CCNC: 16 U/L (ref 0–45)
BILIRUB SERPL-MCNC: 0.4 MG/DL (ref 0.2–1.3)
BUN SERPL-MCNC: 27 MG/DL (ref 7–30)
C COLI+JEJUNI+LARI FUSA STL QL NAA+PROBE: NOT DETECTED
CALCIUM SERPL-MCNC: 9 MG/DL (ref 8.5–10.1)
CHLORIDE SERPL-SCNC: 105 MMOL/L (ref 94–109)
CO2 SERPL-SCNC: 26 MMOL/L (ref 20–32)
CREAT SERPL-MCNC: 1.24 MG/DL (ref 0.66–1.25)
EC STX1 GENE STL QL NAA+PROBE: NOT DETECTED
EC STX2 GENE STL QL NAA+PROBE: NOT DETECTED
ENTERIC PATHOGEN COMMENT: NORMAL
GFR SERPL CREATININE-BSD FRML MDRD: 66 ML/MIN/{1.73_M2}
GLUCOSE SERPL-MCNC: 100 MG/DL (ref 70–99)
NOROV GI+II ORF1-ORF2 JNC STL QL NAA+PR: NOT DETECTED
POTASSIUM SERPL-SCNC: 4.4 MMOL/L (ref 3.4–5.3)
PROT SERPL-MCNC: 7.2 G/DL (ref 6.8–8.8)
RVA NSP5 STL QL NAA+PROBE: NOT DETECTED
SALMONELLA SP RPOD STL QL NAA+PROBE: NOT DETECTED
SHIGELLA SP+EIEC IPAH STL QL NAA+PROBE: NOT DETECTED
SODIUM SERPL-SCNC: 136 MMOL/L (ref 133–144)
V CHOL+PARA RFBL+TRKH+TNAA STL QL NAA+PR: NOT DETECTED
Y ENTERO RECN STL QL NAA+PROBE: NOT DETECTED

## 2020-04-01 PROCEDURE — 87493 C DIFF AMPLIFIED PROBE: CPT | Performed by: FAMILY MEDICINE

## 2020-04-01 ASSESSMENT — ASTHMA QUESTIONNAIRES: ACT_TOTALSCORE: 25

## 2020-04-02 DIAGNOSIS — R19.7 DIARRHEA, UNSPECIFIED TYPE: ICD-10-CM

## 2020-04-02 LAB
C DIFF TOX B STL QL: NEGATIVE
C PARVUM AG STL QL IA: NEGATIVE
G LAMBLIA AG STL QL IA: NEGATIVE
SPECIMEN SOURCE: NORMAL
SPECIMEN SOURCE: NORMAL

## 2020-04-06 ENCOUNTER — MYC MEDICAL ADVICE (OUTPATIENT)
Dept: FAMILY MEDICINE | Facility: CLINIC | Age: 55
End: 2020-04-06

## 2020-04-07 ENCOUNTER — MYC MEDICAL ADVICE (OUTPATIENT)
Dept: FAMILY MEDICINE | Facility: CLINIC | Age: 55
End: 2020-04-07

## 2020-04-07 DIAGNOSIS — R19.7 DIARRHEA, UNSPECIFIED TYPE: Primary | ICD-10-CM

## 2020-04-08 NOTE — TELEPHONE ENCOUNTER
Will loop Kennedy in; he has been dealing with him over time; I saw him the one time.    My thoughts is PORTIA Benavides is there today...

## 2020-04-08 NOTE — TELEPHONE ENCOUNTER
Called to patient - he is noting increasing/worsening diarrhea once again.  More watery in consistency - for awhile it had been thickening slightly with increased imodium and fiber. There is never blood.  He notes most of the diarrhea has been in the am and pm; during the day not having BMs as much.  There is very little stomach pain if at all. Feeling gassy.  BPs low on Monday, now back to 110s/70s  Trying to move back to normal meals  Drinking water and gatorade  Using imodium around once daily.    We'll have him start increased dose of imodium, add probiotic and continue to push fluids. If not improved by Friday would consider empiric abx, given difficulties seeing specialty with COVID outbreak. But ultimately we will likely need GI involvement if not improving

## 2020-04-08 NOTE — TELEPHONE ENCOUNTER
Spoke with patient and is continuing with diarrhea 3-4 times daily. Usually in am. Will take immodium and have episodes in late afternoon. Symptoms have not improved since visit. Is continuing with fluids including gatorade.    Denies fever and blood in stool.     Did you want to place GI referral?    Hanane Cuba RN

## 2020-04-10 ENCOUNTER — MYC MEDICAL ADVICE (OUTPATIENT)
Dept: FAMILY MEDICINE | Facility: CLINIC | Age: 55
End: 2020-04-10

## 2020-04-13 ENCOUNTER — MYC MEDICAL ADVICE (OUTPATIENT)
Dept: FAMILY MEDICINE | Facility: CLINIC | Age: 55
End: 2020-04-13

## 2020-04-13 ENCOUNTER — TRANSFERRED RECORDS (OUTPATIENT)
Dept: HEALTH INFORMATION MANAGEMENT | Facility: CLINIC | Age: 55
End: 2020-04-13

## 2020-04-14 ENCOUNTER — TRANSFERRED RECORDS (OUTPATIENT)
Dept: HEALTH INFORMATION MANAGEMENT | Facility: CLINIC | Age: 55
End: 2020-04-14

## 2020-04-14 LAB — TSH SERPL-ACNC: 0.77 UIU/ML (ref 0.45–4.5)

## 2020-04-15 ENCOUNTER — TRANSFERRED RECORDS (OUTPATIENT)
Dept: HEALTH INFORMATION MANAGEMENT | Facility: CLINIC | Age: 55
End: 2020-04-15

## 2020-04-20 ENCOUNTER — TRANSFERRED RECORDS (OUTPATIENT)
Dept: HEALTH INFORMATION MANAGEMENT | Facility: CLINIC | Age: 55
End: 2020-04-20

## 2020-04-23 ENCOUNTER — MYC MEDICAL ADVICE (OUTPATIENT)
Dept: FAMILY MEDICINE | Facility: CLINIC | Age: 55
End: 2020-04-23

## 2020-04-23 NOTE — TELEPHONE ENCOUNTER
Routing MyC message to TC to e-mail patient letter, as requested by patient.     Harini EDMOND, Triage RN

## 2020-04-23 NOTE — LETTER
[facility]  [address] [address]  Phone: [phone]    April 23, 2020        Sterling GRIGSBY Parker  [address]  [place] [address]          To whom it may concern:    RE: Sterling GRIGSBY Parker    Patient may return to work Monday, 4/27/2020 with the following:  No working or lifting restrictions    Please contact me for questions or concerns.      Sincerely,        Donnell Arroyo PA-C

## 2020-05-08 ENCOUNTER — NURSE TRIAGE (OUTPATIENT)
Dept: NURSING | Facility: CLINIC | Age: 55
End: 2020-05-08

## 2020-05-08 NOTE — TELEPHONE ENCOUNTER
Wife calls and says that her  has swollen ankles. Symptom began last night. Right ankle is more swollen than the left ankle. Wife says that she thinks the swelling may be from her 's colitis medicine. Wife says that she is not sure what  will do this noe. COVID 19 Nurse Triage Plan/Patient Instructions    Please be aware that novel coronavirus (COVID-19) may be circulating in the community. If you develop symptoms such as fever, cough, or SOB or if you have concerns about the presence of another infection including coronavirus (COVID-19), please contact your health care provider or visit [email or web address].     Disposition/Instructions    Patient to stay at home and follow home care protocol based instructions.     Thank you for limiting contact with others, wearing a simple mask to cover your cough, practice good hand hygiene habits and accessing our [employer] services where possible to limit the spread of this virus.    For more information about COVID19 and options for caring for yourself at home, please visit the CDC website at https://www.cdc.gov/coronavirus/2019-ncov/about/steps-when-sick.html  For more options for care at [facility], please visit our website at [email or web address]    For more information, please use the [place] Department of Health COVID-19 Website: https://www.health.[facility].[place].us/diseases/coronavirus/index.html  [place] Department of Health ([facility]) COVID-19 Hotlines (Interpreters available):      Health questions: Phone Number: [phone] or 1-694.267.5238 and Hours: 7 a.m. to 7 p.m.    Schools and  questions: Phone Number: [phone] or 1-231.267.8000 and Hours 7 a.m. to 7 p.m.                  Reason for Disposition    [1] Thigh, calf, or ankle swelling AND [2] bilateral AND [3] 1 side is more swollen    Additional Information    Negative: Chest pain    Negative: Followed an ankle injury    Negative: Ankle pain is main  symptom    Swelling of both ankles (i.e., pedal edema)    Negative: Severe difficulty breathing (e.g., struggling for each breath, speaks in single words)    Negative: Looks like a broken bone or dislocated joint (e.g., crooked or deformed)    Negative: Sounds like a life-threatening emergency to the triager    Negative: Chest pain    Negative: Followed a leg injury    Negative: [1] Small area of swelling AND [2] followed an insect bite to the area    Negative: Swelling of one ankle joint    Negative: Swelling of knee is main symptom    Negative: Pregnant    Negative: Postpartum (< 1 month since delivery)    Negative: Difficulty breathing at rest    Negative: Entire foot is cool or blue in comparison to other side    Negative: [1] Can't walk or can barely walk AND [2] new onset    Negative: [1] Difficulty breathing with exertion (e.g., walking) AND [2] new onset or worsening    Negative: [1] Red area or streak AND [2] fever    Negative: [1] Swelling is painful to touch AND [2] fever    Negative: [1] Cast on leg or ankle AND [2] now increased pain    Negative: Patient sounds very sick or weak to the triager    Negative: SEVERE leg swelling (e.g., swelling extends above knee, entire leg is swollen, weeping fluid)    Negative: [1] Red area or streak [2] large (> 2 in. or 5 cm)    Negative: [1] Thigh or calf pain AND [2] only 1 side AND [3] present > 1 hour    Negative: [1] Thigh, calf, or ankle swelling AND [2] only 1 side    Protocols used: LEG SWELLING AND EDEMA-A-AH, ANKLE SWELLING-A-AH

## 2020-12-28 ASSESSMENT — ENCOUNTER SYMPTOMS
HEADACHES: 1
SHORTNESS OF BREATH: 0
COUGH: 0
EYE PAIN: 0
DIARRHEA: 0
CONSTIPATION: 0
JOINT SWELLING: 0
FREQUENCY: 0
ABDOMINAL PAIN: 0
HEMATOCHEZIA: 0
PALPITATIONS: 0
DIZZINESS: 0
NERVOUS/ANXIOUS: 0
NAUSEA: 0
HEMATURIA: 0
MYALGIAS: 1
WEAKNESS: 0
CHILLS: 0
FEVER: 0
SORE THROAT: 0
DYSURIA: 0
ARTHRALGIAS: 0
PARESTHESIAS: 0
HEARTBURN: 0

## 2020-12-31 ENCOUNTER — OFFICE VISIT (OUTPATIENT)
Dept: FAMILY MEDICINE | Facility: CLINIC | Age: 55
End: 2020-12-31
Payer: COMMERCIAL

## 2020-12-31 VITALS
SYSTOLIC BLOOD PRESSURE: 122 MMHG | RESPIRATION RATE: 16 BRPM | HEIGHT: 69 IN | OXYGEN SATURATION: 97 % | BODY MASS INDEX: 26.48 KG/M2 | WEIGHT: 178.8 LBS | HEART RATE: 82 BPM | DIASTOLIC BLOOD PRESSURE: 78 MMHG | TEMPERATURE: 98.2 F

## 2020-12-31 DIAGNOSIS — Z00.00 ROUTINE GENERAL MEDICAL EXAMINATION AT A HEALTH CARE FACILITY: Primary | ICD-10-CM

## 2020-12-31 DIAGNOSIS — Z11.4 SCREENING FOR HIV (HUMAN IMMUNODEFICIENCY VIRUS): ICD-10-CM

## 2020-12-31 DIAGNOSIS — L30.9 ECZEMA, UNSPECIFIED TYPE: ICD-10-CM

## 2020-12-31 DIAGNOSIS — J45.30 MILD PERSISTENT ASTHMA WITHOUT COMPLICATION: ICD-10-CM

## 2020-12-31 DIAGNOSIS — I10 HYPERTENSION GOAL BP (BLOOD PRESSURE) < 140/90: ICD-10-CM

## 2020-12-31 DIAGNOSIS — Z11.59 NEED FOR HEPATITIS C SCREENING TEST: ICD-10-CM

## 2020-12-31 PROCEDURE — 99213 OFFICE O/P EST LOW 20 MIN: CPT | Mod: 25 | Performed by: PHYSICIAN ASSISTANT

## 2020-12-31 PROCEDURE — 99396 PREV VISIT EST AGE 40-64: CPT | Mod: 25 | Performed by: PHYSICIAN ASSISTANT

## 2020-12-31 PROCEDURE — 90471 IMMUNIZATION ADMIN: CPT | Performed by: PHYSICIAN ASSISTANT

## 2020-12-31 PROCEDURE — 90714 TD VACC NO PRESV 7 YRS+ IM: CPT | Performed by: PHYSICIAN ASSISTANT

## 2020-12-31 RX ORDER — ALBUTEROL SULFATE 90 UG/1
1-2 AEROSOL, METERED RESPIRATORY (INHALATION) EVERY 6 HOURS PRN
Qty: 2 INHALER | Refills: 1 | Status: SHIPPED | OUTPATIENT
Start: 2020-12-31

## 2020-12-31 RX ORDER — MONTELUKAST SODIUM 10 MG/1
1 TABLET ORAL AT BEDTIME
Qty: 90 TABLET | Refills: 3 | Status: SHIPPED | OUTPATIENT
Start: 2020-12-31 | End: 2022-02-14

## 2020-12-31 RX ORDER — TRIAMCINOLONE ACETONIDE 1 MG/G
OINTMENT TOPICAL 2 TIMES DAILY
Qty: 30 G | Refills: 0 | Status: SHIPPED | OUTPATIENT
Start: 2020-12-31 | End: 2023-03-01

## 2020-12-31 RX ORDER — LISINOPRIL/HYDROCHLOROTHIAZIDE 10-12.5 MG
1 TABLET ORAL DAILY
Qty: 90 TABLET | Refills: 3 | Status: SHIPPED | OUTPATIENT
Start: 2020-12-31 | End: 2021-12-27

## 2020-12-31 RX ORDER — VERAPAMIL HYDROCHLORIDE 360 MG/1
360 CAPSULE, DELAYED RELEASE PELLETS ORAL DAILY
Qty: 90 CAPSULE | Refills: 1 | Status: SHIPPED | OUTPATIENT
Start: 2020-12-31 | End: 2021-07-27

## 2020-12-31 ASSESSMENT — ENCOUNTER SYMPTOMS
HEARTBURN: 0
MYALGIAS: 1
HEMATOCHEZIA: 0
FREQUENCY: 0
SORE THROAT: 0
HEMATURIA: 0
HEADACHES: 1
DIARRHEA: 0
FEVER: 0
ABDOMINAL PAIN: 0
ARTHRALGIAS: 0
NAUSEA: 0
PALPITATIONS: 0
NERVOUS/ANXIOUS: 0
DIZZINESS: 0
COUGH: 0
CONSTIPATION: 0
DYSURIA: 0
WEAKNESS: 0
SHORTNESS OF BREATH: 0
CHILLS: 0
EYE PAIN: 0
PARESTHESIAS: 0
JOINT SWELLING: 0

## 2020-12-31 ASSESSMENT — MIFFLIN-ST. JEOR: SCORE: 1638.47

## 2020-12-31 NOTE — LETTER
"My Asthma Action Plan    Name: Sterling Canales   YOB: 1965  Date: 12/31/2020   My doctor: Donnell Arroyo PA-C   My clinic: [facility]        My Control Medicine: { :662644}  My Rescue Medicine: { :281555::\"Albuterol (Proair/Ventolin/Proventil HFA) 2-4 puffs EVERY 4 HOURS as needed. Use a spacer if recommended by your provider.\"}  {AAP include Oral Steroid (Optional) :563061} My Asthma Severity:   { :588857}  Know your asthma triggers: { :409825}               GREEN ZONE   Good Control    I feel good    No cough or wheeze    Can work, sleep and play without asthma symptoms       Take your asthma control medicine every day.     1. If exercise triggers your asthma, take your rescue medication    15 minutes before exercise or sports, and    During exercise if you have asthma symptoms  2. Spacer to use with inhaler: If you have a spacer, make sure to use it with your inhaler             YELLOW ZONE Getting Worse  I have ANY of these:    I do not feel good    Cough or wheeze    Chest feels tight    Wake up at night   1. Keep taking your Green Zone medications  2. Start taking your rescue medicine:    every 20 minutes for up to 1 hour. Then every 4 hours for 24-48 hours.  3. If you stay in the Yellow Zone for more than 12-24 hours, contact your doctor.  4. If you do not return to the Green Zone in 12-24 hours or you get worse, start taking your oral steroid medicine if prescribed by your provider.           RED ZONE Medical Alert - Get Help  I have ANY of these:    I feel awful    Medicine is not helping    Breathing getting harder    Trouble walking or talking    Nose opens wide to breathe       1. Take your rescue medicine NOW  2. If your provider has prescribed an oral steroid medicine, start taking it NOW  3. Call your doctor NOW  4. If you are still in the Red Zone after 20 minutes and you have not reached your doctor:    Take your rescue medicine again and    Call 911 " or go to the emergency room right away    See your regular doctor within 2 weeks of an Emergency Room or Urgent Care visit for follow-up treatment.          Annual Reminders:  Meet with Asthma Educator,  Flu Shot in the Fall, consider Pneumonia Vaccination for patients with asthma (aged 19 and older).    Pharmacy:    [facility]  [facility] - [place], [place] - [address] [id number]  [place] [address] [address] [place] [address] & [facility] - [place], [place] - [facility] [address]    Electronically signed by Donnell Arroyo PA-C   Date: 12/31/20                      Asthma Triggers  How To Control Things That Make Your Asthma Worse    Triggers are things that make your asthma worse.  Look at the list below to help you find your triggers and what you can do about them.  You can help prevent asthma flare-ups by staying away from your triggers.      Trigger                                                          What you can do   Cigarette Smoke  Tobacco smoke can make asthma worse. Do not allow smoking in your home, car or around you.  Be sure no one smokes at a child s day care or school.  If you smoke, ask your health care provider for ways to help you quit.  Ask family members to quit too.  Ask your health care provider for a referral to Quit Plan to help you quit smoking, or call 2-460-834-PLAN.     Colds, Flu, Bronchitis  These are common triggers of asthma. Wash your hands often.  Don t touch your eyes, nose or mouth.  Get a flu shot every year.     Dust Mites  These are tiny bugs that live in cloth or carpet. They are too small to see. Wash sheets and blankets in hot water every week.   Encase pillows and mattress in dust mite proof covers.  Avoid having carpet if you can. If you have carpet, vacuum weekly.   Use a dust mask and HEPA vacuum.   Pollen and Outdoor  Mold  Some people are allergic to trees, grass, or weed pollen, or molds. Try to keep your windows closed.  Limit time out doors when pollen count is high.   Ask you health care provider about taking medicine during allergy season.     Animal Dander  Some people are allergic to skin flakes, urine or saliva from pets with fur or feathers. Keep pets with fur or feathers out of your home.    If you can t keep the pet outdoors, then keep the pet out of your bedroom.  Keep the bedroom door closed.  Keep pets off cloth furniture and away from stuffed toys.     Mice, Rats, and Cockroaches   Some people are allergic to the waste from these pests.   Cover food and garbage.  Clean up spills and food crumbs.  Store grease in the refrigerator.   Keep food out of the bedroom.   Indoor Mold  This can be a trigger if your home has high moisture. Fix leaking faucets, pipes, or other sources of water.   Clean moldy surfaces.  Dehumidify basement if it is damp and smelly.   Smoke, Strong Odors, and Sprays  These can reduce air quality. Stay away from strong odors and sprays, such as perfume, powder, hair spray, paints, smoke incense, paint, cleaning products, candles and new carpet.   Exercise or Sports  Some people with asthma have this trigger. Be active!  Ask your doctor about taking medicine before sports or exercise to prevent symptoms.    Warm up for 5-10 minutes before and after sports or exercise.     Other Triggers of Asthma  Cold air:  Cover your nose and mouth with a scarf.  Sometimes laughing or crying can be a trigger.  Some medicines and food can trigger asthma.

## 2020-12-31 NOTE — PROGRESS NOTES
SUBJECTIVE:   CC: Sterling Canales is an 55 year old male who presents for preventative health visit.       Patient has been advised of split billing requirements and indicates understanding: Yes  Healthy Habits:     Getting at least 3 servings of Calcium per day:  Yes    Bi-annual eye exam:  Yes    Dental care twice a year:  Yes    Sleep apnea or symptoms of sleep apnea:  None    Diet:  Regular (no restrictions) and Low salt    Frequency of exercise:  None    Taking medications regularly:  Yes    Medication side effects:  None    PHQ-2 Total Score: 0    Additional concerns today:  No    Sterling Canales is a 55 year old male who presents today for annual check up   Has stayed healthy over the fall.   Episode of prolonged diarrhea early this spring - microscopic colitis   -improved on budesonide  BP came back up following diarrhea so restarted his Rx  His neurologist is leaving and he needs to establish with someone new.  He is not yet sure where he wants to go - she gave him a few recs    Today's PHQ-2 Score:   PHQ-2 ( 1999 Pfizer) 12/28/2020   Q1: Little interest or pleasure in doing things 0   Q2: Feeling down, depressed or hopeless 0   PHQ-2 Score 0   Q1: Little interest or pleasure in doing things Not at all   Q2: Feeling down, depressed or hopeless Not at all   PHQ-2 Score 0       Abuse: Current or Past(Physical, Sexual or Emotional)- No  Do you feel safe in your environment? Yes        Social History     Tobacco Use     Smoking status: Never Smoker     Smokeless tobacco: Never Used   Substance Use Topics     Alcohol use: Yes     Alcohol/week: 0.0 - 1.7 standard drinks     Comment: rarely         Alcohol Use 12/28/2020   Prescreen: >3 drinks/day or >7 drinks/week? No   Prescreen: >3 drinks/day or >7 drinks/week? -       Last PSA:   PSA   Date Value Ref Range Status   01/07/2020 1.06 0 - 4 ug/L Final     Comment:     Assay Method:  Chemiluminescence using Siemens Vista analyzer       Reviewed orders with  "patient. Reviewed health maintenance and updated orders accordingly - Yes  Lab work is in process/futured for fasting    Reviewed and updated as needed this visit by clinical staff  Tobacco  Allergies  Meds   Med Hx  Surg Hx  Fam Hx  Soc Hx        Reviewed and updated as needed this visit by Provider                  Review of Systems   Constitutional: Negative for chills and fever.   HENT: Negative for congestion, ear pain, hearing loss and sore throat.    Eyes: Negative for pain and visual disturbance.   Respiratory: Negative for cough and shortness of breath.    Cardiovascular: Positive for peripheral edema. Negative for chest pain and palpitations.   Gastrointestinal: Negative for abdominal pain, constipation, diarrhea, heartburn, hematochezia and nausea.   Genitourinary: Negative for discharge, dysuria, frequency, genital sores, hematuria, impotence and urgency.   Musculoskeletal: Positive for myalgias. Negative for arthralgias and joint swelling.   Skin: Negative for rash.   Neurological: Positive for headaches. Negative for dizziness, weakness and paresthesias.   Psychiatric/Behavioral: Negative for mood changes. The patient is not nervous/anxious.        OBJECTIVE:   /78 (BP Location: Right arm, Cuff Size: Adult Regular)   Pulse 82   Temp 98.2  F (36.8  C) (Oral)   Resp 16   Ht 1.74 m (5' 8.5\")   Wt 81.1 kg (178 lb 12.8 oz)   SpO2 97%   BMI 26.79 kg/m      Physical Exam  GENERAL: healthy, alert and no distress  EYES: Eyes grossly normal to inspection, PERRL and conjunctivae and sclerae normal  HENT: ear canals and TM's normal, nose and mouth without ulcers or lesions  NECK: no adenopathy, no asymmetry, masses, or scars and thyroid normal to palpation  RESP: lungs clear to auscultation - no rales, rhonchi or wheezes  CV: regular rate and rhythm, normal S1 S2, no S3 or S4, no murmur, click or rub, no peripheral edema and peripheral pulses strong  ABDOMEN: soft, nontender, no " hepatosplenomegaly, no masses and bowel sounds normal   (male): normal male genitalia without lesions or urethral discharge, no hernia  MS: no gross musculoskeletal defects noted, no edema  SKIN: no suspicious lesions or rashes  NEURO: Normal strength and tone, mentation intact and speech normal  PSYCH: mentation appears normal, affect normal/bright    Diagnostic Test Results:  Labs reviewed in Epic  Updating when fasting    ASSESSMENT/PLAN:   1. Routine general medical examination at a health care facility  Reviewed personal and family history. Reviewed age appropriate screenings. Recommended any needed vaccinations.  - REVIEW OF HEALTH MAINTENANCE PROTOCOL ORDERS  - TD PRESERV FREE, IM (7+ YRS)  - Lipid panel reflex to direct LDL Fasting; Future    2. Screening for HIV (human immunodeficiency virus)  3. Need for hepatitis C screening test  Routine per CDC  - HIV Antigen Antibody Combo; Future  - Hepatitis C Screen Reflex to HCV RNA Quant and Genotype; Future    4. Hypertension goal BP (blood pressure) < 140/90  Controlled. Continue present management. Had been a bit soft with all of the diarrhea but improved now  - lisinopril-hydrochlorothiazide (ZESTORETIC) 10-12.5 MG tablet; Take 1 tablet by mouth daily  Dispense: 90 tablet; Refill: 3  - verapamil ER (VERELAN) 360 MG 24 hr capsule; Take 1 capsule (360 mg) by mouth daily  Dispense: 90 capsule; Refill: 1  - **Comprehensive metabolic panel FUTURE anytime; Future  - Albumin Random Urine Quantitative with Creat Ratio; Future    5. Mild persistent asthma without complication  Controlled. Continue present management.   - montelukast (SINGULAIR) 10 MG tablet; Take 1 tablet (10 mg) by mouth At Bedtime  Dispense: 90 tablet; Refill: 3  - albuterol (PROVENTIL HFA) 108 (90 Base) MCG/ACT inhaler; Inhale 1-2 puffs into the lungs every 6 hours as needed for shortness of breath / dyspnea  Dispense: 2 Inhaler; Refill: 1    6. Eczema, unspecified type  - triamcinolone (KENALOG)  "0.1 % external ointment; Apply topically 2 times daily  Dispense: 30 g; Refill: 0    Patient has been advised of split billing requirements and indicates understanding: Yes  COUNSELING:   Reviewed preventive health counseling, as reflected in patient instructions    Estimated body mass index is 26.79 kg/m  as calculated from the following:    Height as of this encounter: 1.74 m (5' 8.5\").    Weight as of this encounter: 81.1 kg (178 lb 12.8 oz).         He reports that he has never smoked. He has never used smokeless tobacco.      Counseling Resources:  ATP IV Guidelines  Pooled Cohorts Equation Calculator  FRAX Risk Assessment  ICSI Preventive Guidelines  Dietary Guidelines for Americans, 2010  USDA's MyPlate  ASA Prophylaxis  Lung CA Screening    Donnell Arroyo PA-C  [facility]  "

## 2021-01-01 ASSESSMENT — ASTHMA QUESTIONNAIRES: ACT_TOTALSCORE: 25

## 2021-01-26 DIAGNOSIS — Z00.00 ROUTINE GENERAL MEDICAL EXAMINATION AT A HEALTH CARE FACILITY: ICD-10-CM

## 2021-01-26 DIAGNOSIS — Z11.4 SCREENING FOR HIV (HUMAN IMMUNODEFICIENCY VIRUS): ICD-10-CM

## 2021-01-26 DIAGNOSIS — I10 HYPERTENSION GOAL BP (BLOOD PRESSURE) < 140/90: ICD-10-CM

## 2021-01-26 DIAGNOSIS — Z11.59 NEED FOR HEPATITIS C SCREENING TEST: ICD-10-CM

## 2021-01-26 PROCEDURE — 82043 UR ALBUMIN QUANTITATIVE: CPT | Performed by: PHYSICIAN ASSISTANT

## 2021-01-26 PROCEDURE — 80061 LIPID PANEL: CPT | Performed by: PHYSICIAN ASSISTANT

## 2021-01-26 PROCEDURE — 87389 HIV-1 AG W/HIV-1&-2 AB AG IA: CPT | Performed by: PHYSICIAN ASSISTANT

## 2021-01-26 PROCEDURE — 86803 HEPATITIS C AB TEST: CPT | Performed by: PHYSICIAN ASSISTANT

## 2021-01-26 PROCEDURE — 80053 COMPREHEN METABOLIC PANEL: CPT | Performed by: PHYSICIAN ASSISTANT

## 2021-01-26 PROCEDURE — 36415 COLL VENOUS BLD VENIPUNCTURE: CPT | Performed by: PHYSICIAN ASSISTANT

## 2021-01-27 LAB
ALBUMIN SERPL-MCNC: 4 G/DL (ref 3.4–5)
ALP SERPL-CCNC: 56 U/L (ref 40–150)
ALT SERPL W P-5'-P-CCNC: 25 U/L (ref 0–70)
ANION GAP SERPL CALCULATED.3IONS-SCNC: 5 MMOL/L (ref 3–14)
AST SERPL W P-5'-P-CCNC: 19 U/L (ref 0–45)
BILIRUB SERPL-MCNC: 0.6 MG/DL (ref 0.2–1.3)
BUN SERPL-MCNC: 15 MG/DL (ref 7–30)
CALCIUM SERPL-MCNC: 9.5 MG/DL (ref 8.5–10.1)
CHLORIDE SERPL-SCNC: 104 MMOL/L (ref 94–109)
CHOLEST SERPL-MCNC: 179 MG/DL
CO2 SERPL-SCNC: 28 MMOL/L (ref 20–32)
CREAT SERPL-MCNC: 1 MG/DL (ref 0.66–1.25)
CREAT UR-MCNC: 93 MG/DL
GFR SERPL CREATININE-BSD FRML MDRD: 85 ML/MIN/{1.73_M2}
GLUCOSE SERPL-MCNC: 84 MG/DL (ref 70–99)
HCV AB SERPL QL IA: NONREACTIVE
HDLC SERPL-MCNC: 57 MG/DL
HIV 1+2 AB+HIV1 P24 AG SERPL QL IA: NONREACTIVE
LDLC SERPL CALC-MCNC: 99 MG/DL
MICROALBUMIN UR-MCNC: <5 MG/L
MICROALBUMIN/CREAT UR: NORMAL MG/G CR (ref 0–17)
NONHDLC SERPL-MCNC: 122 MG/DL
POTASSIUM SERPL-SCNC: 4 MMOL/L (ref 3.4–5.3)
PROT SERPL-MCNC: 7.2 G/DL (ref 6.8–8.8)
SODIUM SERPL-SCNC: 137 MMOL/L (ref 133–144)
TRIGL SERPL-MCNC: 116 MG/DL

## 2021-03-18 ENCOUNTER — IMMUNIZATION (OUTPATIENT)
Dept: NURSING | Facility: CLINIC | Age: 56
End: 2021-03-18
Payer: COMMERCIAL

## 2021-03-18 PROCEDURE — 0011A PR COVID VAC MODERNA 100 MCG/0.5 ML IM: CPT

## 2021-03-18 PROCEDURE — 91301 PR COVID VAC MODERNA 100 MCG/0.5 ML IM: CPT

## 2021-04-15 ENCOUNTER — IMMUNIZATION (OUTPATIENT)
Dept: NURSING | Facility: CLINIC | Age: 56
End: 2021-04-15
Attending: INTERNAL MEDICINE
Payer: COMMERCIAL

## 2021-04-15 PROCEDURE — 91301 PR COVID VAC MODERNA 100 MCG/0.5 ML IM: CPT

## 2021-04-15 PROCEDURE — 0012A PR COVID VAC MODERNA 100 MCG/0.5 ML IM: CPT

## 2021-09-03 ENCOUNTER — OFFICE VISIT (OUTPATIENT)
Dept: FAMILY MEDICINE | Facility: CLINIC | Age: 56
End: 2021-09-03
Payer: COMMERCIAL

## 2021-09-03 VITALS
SYSTOLIC BLOOD PRESSURE: 122 MMHG | OXYGEN SATURATION: 98 % | BODY MASS INDEX: 26.5 KG/M2 | HEART RATE: 89 BPM | HEIGHT: 69 IN | RESPIRATION RATE: 16 BRPM | WEIGHT: 178.9 LBS | DIASTOLIC BLOOD PRESSURE: 74 MMHG | TEMPERATURE: 97.6 F

## 2021-09-03 DIAGNOSIS — R10.13 EPIGASTRIC PAIN: Primary | ICD-10-CM

## 2021-09-03 DIAGNOSIS — G35 MS (MULTIPLE SCLEROSIS) (H): ICD-10-CM

## 2021-09-03 LAB
ALBUMIN SERPL-MCNC: 3.8 G/DL (ref 3.4–5)
ALP SERPL-CCNC: 60 U/L (ref 40–150)
ALT SERPL W P-5'-P-CCNC: 35 U/L (ref 0–70)
AMYLASE SERPL-CCNC: 48 U/L (ref 30–110)
ANION GAP SERPL CALCULATED.3IONS-SCNC: 5 MMOL/L (ref 3–14)
AST SERPL W P-5'-P-CCNC: 26 U/L (ref 0–45)
BASOPHILS # BLD AUTO: 0 10E3/UL (ref 0–0.2)
BASOPHILS NFR BLD AUTO: 1 %
BILIRUB SERPL-MCNC: 0.5 MG/DL (ref 0.2–1.3)
BUN SERPL-MCNC: 14 MG/DL (ref 7–30)
CALCIUM SERPL-MCNC: 9.5 MG/DL (ref 8.5–10.1)
CHLORIDE BLD-SCNC: 106 MMOL/L (ref 94–109)
CO2 SERPL-SCNC: 27 MMOL/L (ref 20–32)
CREAT SERPL-MCNC: 1.03 MG/DL (ref 0.66–1.25)
CRP SERPL-MCNC: <2.9 MG/L (ref 0–8)
EOSINOPHIL # BLD AUTO: 0.3 10E3/UL (ref 0–0.7)
EOSINOPHIL NFR BLD AUTO: 7 %
ERYTHROCYTE [DISTWIDTH] IN BLOOD BY AUTOMATED COUNT: 13.3 % (ref 10–15)
GFR SERPL CREATININE-BSD FRML MDRD: 82 ML/MIN/1.73M2
GLUCOSE BLD-MCNC: 95 MG/DL (ref 70–99)
HCT VFR BLD AUTO: 44.9 % (ref 40–53)
HGB BLD-MCNC: 14.5 G/DL (ref 13.3–17.7)
LIPASE SERPL-CCNC: 118 U/L (ref 73–393)
LYMPHOCYTES # BLD AUTO: 1.3 10E3/UL (ref 0.8–5.3)
LYMPHOCYTES NFR BLD AUTO: 29 %
MCH RBC QN AUTO: 28.4 PG (ref 26.5–33)
MCHC RBC AUTO-ENTMCNC: 32.3 G/DL (ref 31.5–36.5)
MCV RBC AUTO: 88 FL (ref 78–100)
MONOCYTES # BLD AUTO: 0.8 10E3/UL (ref 0–1.3)
MONOCYTES NFR BLD AUTO: 17 %
NEUTROPHILS # BLD AUTO: 2.1 10E3/UL (ref 1.6–8.3)
NEUTROPHILS NFR BLD AUTO: 45 %
PLATELET # BLD AUTO: 226 10E3/UL (ref 150–450)
POTASSIUM BLD-SCNC: 4.7 MMOL/L (ref 3.4–5.3)
PROT SERPL-MCNC: 7.2 G/DL (ref 6.8–8.8)
RBC # BLD AUTO: 5.1 10E6/UL (ref 4.4–5.9)
SODIUM SERPL-SCNC: 138 MMOL/L (ref 133–144)
WBC # BLD AUTO: 4.6 10E3/UL (ref 4–11)

## 2021-09-03 PROCEDURE — 99214 OFFICE O/P EST MOD 30 MIN: CPT | Performed by: PHYSICIAN ASSISTANT

## 2021-09-03 PROCEDURE — 36415 COLL VENOUS BLD VENIPUNCTURE: CPT | Performed by: PHYSICIAN ASSISTANT

## 2021-09-03 PROCEDURE — 83690 ASSAY OF LIPASE: CPT | Performed by: PHYSICIAN ASSISTANT

## 2021-09-03 PROCEDURE — 80053 COMPREHEN METABOLIC PANEL: CPT | Performed by: PHYSICIAN ASSISTANT

## 2021-09-03 PROCEDURE — 82150 ASSAY OF AMYLASE: CPT | Performed by: PHYSICIAN ASSISTANT

## 2021-09-03 PROCEDURE — 85025 COMPLETE CBC W/AUTO DIFF WBC: CPT | Performed by: PHYSICIAN ASSISTANT

## 2021-09-03 PROCEDURE — 86140 C-REACTIVE PROTEIN: CPT | Performed by: PHYSICIAN ASSISTANT

## 2021-09-03 ASSESSMENT — MIFFLIN-ST. JEOR: SCORE: 1637.9

## 2021-09-03 ASSESSMENT — PAIN SCALES - GENERAL: PAINLEVEL: MILD PAIN (2)

## 2021-09-03 NOTE — PROGRESS NOTES
"    Assessment & Plan     Epigastric pain  Increased symptoms over past few months. Recent hx of colitis so he is somewhat concerned about this as well. Today more depicting possible PUD. Screen with EGD and blood work. Start ppi. Follow-up per results. If these are all negative, we'll consider short trial of budesonide  - Adult Gastro Ref - Procedure Only; Future  - CBC with platelets and differential; Future  - Comprehensive metabolic panel (BMP + Alb, Alk Phos, ALT, AST, Total. Bili, TP); Future  - Amylase; Future  - Lipase; Future    MS (multiple sclerosis) (H)  On aubagio. Recommended booster of COVID immunization    BMI:   Estimated body mass index is 26.61 kg/m  as calculated from the following:    Height as of this encounter: 1.746 m (5' 8.75\").    Weight as of this encounter: 81.1 kg (178 lb 14.4 oz).       Return in about 3 weeks (around 9/24/2021) for recheck if symptoms are not improving..    HELGA Gaytan [facility] MICHELLECARLOS Katz is a 56 year old who presents for the following health issues     HPI     Abdominal/Flank Pain  Onset/Duration: since June  Description:   Character: Sharp and hunger feeling   Location: all over  Radiation: None  Intensity: moderate  Progression of Symptoms:  same  Accompanying Signs & Symptoms:  Fever/Chills: no  Gas/Bloating: no  Nausea: no  Vomitting: no  Diarrhea: no  Constipation: no  Dysuria or Hematuria: no  History:   Trauma: no  Previous similar pain: no  Previous tests done: none  Precipitating factors:   Does the pain change with:     Food: YES    Bowel Movement: no    Urination: no   Other factors:  no  Therapies tried and outcome: benloc GRIGSBY Parker is a 56 year old male who presents today for ongoing abdominal symptoms since June  Pain feels \"unusual\" for him  They are \"like a severe hunger pain\", somewhat sharper  Does move around - but usually just below the rib cage; not low abdomen  Happening daily  No " "vomiting or nausea  Eating sometimes improves;  BMs are loose but not diarrhea  Will wake up with pain at times and need TUMs, moderately helpful  No fevers, chills  No major heartburn symptoms     Review of Systems   Constitutional, HEENT, cardiovascular, pulmonary, gi and gu systems are negative, except as otherwise noted.      Objective    /74 (BP Location: Right arm, Patient Position: Chair, Cuff Size: Adult Regular)   Pulse 89   Temp 97.6  F (36.4  C) (Tympanic)   Resp 16   Ht 1.746 m (5' 8.75\")   Wt 81.1 kg (178 lb 14.4 oz)   SpO2 98%   BMI 26.61 kg/m    Body mass index is 26.61 kg/m .  Physical Exam   GENERAL: healthy, alert and no distress  RESP: lungs clear to auscultation - no rales, rhonchi or wheezes  CV: regular rate and rhythm, normal S1 S2, no S3 or S4, no murmur, click or rub, no peripheral edema and peripheral pulses strong  ABDOMEN: tenderness epigastric, bowel sounds normal and no palpable or pulsatile masses  PSYCH: mentation appears normal, affect normal/bright    See A/P for labs      "

## 2021-09-04 ENCOUNTER — HEALTH MAINTENANCE LETTER (OUTPATIENT)
Age: 56
End: 2021-09-04

## 2021-09-04 ASSESSMENT — ASTHMA QUESTIONNAIRES: ACT_TOTALSCORE: 20

## 2021-09-08 DIAGNOSIS — Z11.59 ENCOUNTER FOR SCREENING FOR OTHER VIRAL DISEASES: ICD-10-CM

## 2021-09-11 ENCOUNTER — LAB (OUTPATIENT)
Dept: LAB | Facility: CLINIC | Age: 56
End: 2021-09-11
Payer: COMMERCIAL

## 2021-09-11 DIAGNOSIS — Z11.59 ENCOUNTER FOR SCREENING FOR OTHER VIRAL DISEASES: ICD-10-CM

## 2021-09-11 PROCEDURE — U0003 INFECTIOUS AGENT DETECTION BY NUCLEIC ACID (DNA OR RNA); SEVERE ACUTE RESPIRATORY SYNDROME CORONAVIRUS 2 (SARS-COV-2) (CORONAVIRUS DISEASE [COVID-19]), AMPLIFIED PROBE TECHNIQUE, MAKING USE OF HIGH THROUGHPUT TECHNOLOGIES AS DESCRIBED BY CMS-2020-01-R: HCPCS

## 2021-09-12 LAB — SARS-COV-2 RNA RESP QL NAA+PROBE: NEGATIVE

## 2021-09-15 ENCOUNTER — HOSPITAL ENCOUNTER (OUTPATIENT)
Facility: CLINIC | Age: 56
Discharge: HOME | End: 2021-09-15
Attending: INTERNAL MEDICINE | Admitting: INTERNAL MEDICINE
Payer: COMMERCIAL

## 2021-09-15 VITALS
RESPIRATION RATE: 16 BRPM | BODY MASS INDEX: 26.52 KG/M2 | HEART RATE: 68 BPM | SYSTOLIC BLOOD PRESSURE: 121 MMHG | OXYGEN SATURATION: 96 % | HEIGHT: 68 IN | WEIGHT: 175 LBS | DIASTOLIC BLOOD PRESSURE: 84 MMHG | TEMPERATURE: 97.8 F

## 2021-09-15 DIAGNOSIS — T39.395A NSAID-INDUCED GASTRIC ULCER: ICD-10-CM

## 2021-09-15 DIAGNOSIS — R53.81 MALAISE AND FATIGUE: ICD-10-CM

## 2021-09-15 DIAGNOSIS — R50.9 FEVER, UNKNOWN ORIGIN: Primary | ICD-10-CM

## 2021-09-15 DIAGNOSIS — I10 HYPERTENSION GOAL BP (BLOOD PRESSURE) < 140/90: ICD-10-CM

## 2021-09-15 DIAGNOSIS — R53.83 MALAISE AND FATIGUE: ICD-10-CM

## 2021-09-15 DIAGNOSIS — Z13.6 CARDIOVASCULAR SCREENING; LDL GOAL LESS THAN 160: ICD-10-CM

## 2021-09-15 DIAGNOSIS — G35 MS (MULTIPLE SCLEROSIS) (H): ICD-10-CM

## 2021-09-15 DIAGNOSIS — K25.9 NSAID-INDUCED GASTRIC ULCER: ICD-10-CM

## 2021-09-15 LAB — UPPER GI ENDOSCOPY: NORMAL

## 2021-09-15 PROCEDURE — 43239 EGD BIOPSY SINGLE/MULTIPLE: CPT | Performed by: INTERNAL MEDICINE

## 2021-09-15 PROCEDURE — 250N000011 HC RX IP 250 OP 636: Performed by: INTERNAL MEDICINE

## 2021-09-15 PROCEDURE — 250N000009 HC RX 250: Performed by: INTERNAL MEDICINE

## 2021-09-15 PROCEDURE — 999N000099 HC STATISTIC MODERATE SEDATION < 10 MIN: Performed by: INTERNAL MEDICINE

## 2021-09-15 PROCEDURE — 88305 TISSUE EXAM BY PATHOLOGIST: CPT | Mod: TC | Performed by: INTERNAL MEDICINE

## 2021-09-15 RX ORDER — NALOXONE HYDROCHLORIDE 0.4 MG/ML
0.4 INJECTION, SOLUTION INTRAMUSCULAR; INTRAVENOUS; SUBCUTANEOUS
Status: DISCONTINUED | OUTPATIENT
Start: 2021-09-15 | End: 2021-09-15 | Stop reason: HOSPADM

## 2021-09-15 RX ORDER — NALOXONE HYDROCHLORIDE 0.4 MG/ML
0.2 INJECTION, SOLUTION INTRAMUSCULAR; INTRAVENOUS; SUBCUTANEOUS
Status: DISCONTINUED | OUTPATIENT
Start: 2021-09-15 | End: 2021-09-15 | Stop reason: HOSPADM

## 2021-09-15 RX ORDER — EPINEPHRINE 1 MG/ML
0.1 INJECTION, SOLUTION INTRAMUSCULAR; SUBCUTANEOUS
Status: DISCONTINUED | OUTPATIENT
Start: 2021-09-15 | End: 2021-09-15 | Stop reason: HOSPADM

## 2021-09-15 RX ORDER — FENTANYL CITRATE 50 UG/ML
25 INJECTION, SOLUTION INTRAMUSCULAR; INTRAVENOUS EVERY 5 MIN PRN
Status: DISCONTINUED | OUTPATIENT
Start: 2021-09-15 | End: 2021-09-15 | Stop reason: HOSPADM

## 2021-09-15 RX ORDER — ONDANSETRON 2 MG/ML
4 INJECTION INTRAMUSCULAR; INTRAVENOUS EVERY 6 HOURS PRN
Status: DISCONTINUED | OUTPATIENT
Start: 2021-09-15 | End: 2021-09-15 | Stop reason: HOSPADM

## 2021-09-15 RX ORDER — FLUMAZENIL 0.1 MG/ML
0.2 INJECTION, SOLUTION INTRAVENOUS
Status: DISCONTINUED | OUTPATIENT
Start: 2021-09-15 | End: 2021-09-15 | Stop reason: HOSPADM

## 2021-09-15 RX ORDER — ATROPINE SULFATE 0.4 MG/ML
0.4 AMPUL (ML) INJECTION
Status: DISCONTINUED | OUTPATIENT
Start: 2021-09-15 | End: 2021-09-15 | Stop reason: HOSPADM

## 2021-09-15 RX ORDER — OMEPRAZOLE 40 MG/1
40 CAPSULE, DELAYED RELEASE ORAL 2 TIMES DAILY
Qty: 60 CAPSULE | Refills: 2 | Status: SHIPPED | OUTPATIENT
Start: 2021-09-15 | End: 2022-02-21

## 2021-09-15 RX ORDER — FENTANYL CITRATE 50 UG/ML
50 INJECTION, SOLUTION INTRAMUSCULAR; INTRAVENOUS
Status: DISCONTINUED | OUTPATIENT
Start: 2021-09-15 | End: 2021-09-15 | Stop reason: HOSPADM

## 2021-09-15 RX ORDER — FENTANYL CITRATE 50 UG/ML
25-50 INJECTION, SOLUTION INTRAMUSCULAR; INTRAVENOUS
Status: DISCONTINUED | OUTPATIENT
Start: 2021-09-15 | End: 2021-09-15 | Stop reason: HOSPADM

## 2021-09-15 RX ORDER — ONDANSETRON 2 MG/ML
4 INJECTION INTRAMUSCULAR; INTRAVENOUS
Status: DISCONTINUED | OUTPATIENT
Start: 2021-09-15 | End: 2021-09-15 | Stop reason: HOSPADM

## 2021-09-15 RX ORDER — SIMETHICONE 40MG/0.6ML
133 SUSPENSION, DROPS(FINAL DOSAGE FORM)(ML) ORAL
Status: DISCONTINUED | OUTPATIENT
Start: 2021-09-15 | End: 2021-09-15 | Stop reason: HOSPADM

## 2021-09-15 RX ORDER — LIDOCAINE 40 MG/G
CREAM TOPICAL
Status: DISCONTINUED | OUTPATIENT
Start: 2021-09-15 | End: 2021-09-15 | Stop reason: HOSPADM

## 2021-09-15 RX ORDER — PROCHLORPERAZINE MALEATE 10 MG
10 TABLET ORAL EVERY 6 HOURS PRN
Status: DISCONTINUED | OUTPATIENT
Start: 2021-09-15 | End: 2021-09-15 | Stop reason: HOSPADM

## 2021-09-15 RX ORDER — ONDANSETRON 4 MG/1
4 TABLET, ORALLY DISINTEGRATING ORAL EVERY 6 HOURS PRN
Status: DISCONTINUED | OUTPATIENT
Start: 2021-09-15 | End: 2021-09-15 | Stop reason: HOSPADM

## 2021-09-15 RX ORDER — FENTANYL CITRATE 50 UG/ML
50-100 INJECTION, SOLUTION INTRAMUSCULAR; INTRAVENOUS
Status: COMPLETED | OUTPATIENT
Start: 2021-09-15 | End: 2021-09-15

## 2021-09-15 RX ADMIN — TOPICAL ANESTHETIC 0.5 ML: 200 SPRAY DENTAL; PERIODONTAL at 11:06

## 2021-09-15 RX ADMIN — MIDAZOLAM 3 MG: 1 INJECTION INTRAMUSCULAR; INTRAVENOUS at 11:08

## 2021-09-15 RX ADMIN — FENTANYL CITRATE 100 MCG: 50 INJECTION, SOLUTION INTRAMUSCULAR; INTRAVENOUS at 11:08

## 2021-09-15 ASSESSMENT — MIFFLIN-ST. JEOR: SCORE: 1608.29

## 2021-09-15 NOTE — DISCHARGE INSTRUCTIONS
"The patient has received a copy of the Provation  report the doctor has written and discharge instructions have been discussed with the patient and responsible adult.  All questions were addressed and answered prior to patient discharge.        Tips to Control Acid Reflux  To control acid reflux, you ll need to make some basic diet and lifestyle changes. The simple steps outlined below may be all you ll need to relieve discomfort.   Watch What You Eat      Avoid fatty foods and spicy foods.    Eat fewer acidic foods, such as citrus and tomato-based foods. These can increase symptoms.     Limit drinking alcohol, caffeine, and fizzy beverages. All increase acid reflux.    Try limiting chocolate, peppermint, and spearmint. These can worsen acid reflux in some people.    Watch When You Eat    Avoid lying down for 3 hours after eating.    Do not snack before going to bed.    Raise Your Head  Raising your head and upper body by 4\" to 6\" helps limit reflux when you re lying down. Put blocks under the head of the bed frame to raise it.                    [id number] [facility], [address] [place] [address], [place] [address]. All rights reserved. This information is not intended as a substitute for professional medical care. Always follow your healthcare professional's instructions.  "

## 2021-09-15 NOTE — CONSULTS
Pre-Endoscopy History and Physical     Sterling Canales MRN# [id number]   YOB: 1965 Age: 56 year old     Date of Procedure: 9/15/2021  Primary care provider: Donnell Arroyo  Type of Endoscopy: esophagogastroduodenoscopy (upper GI endoscopy)  Reason for Procedure: epigastric abdominal pain  Type of Anesthesia Anticipated: Moderate (conscious) sedation    HPI:    Sterling is a 56 year old male who will be undergoing the above procedure.      A history and physical has been performed. The patient's medications and allergies have been reviewed. The risks and benefits of the procedure and the sedation options and risks were discussed with the patient.  All questions were answered and informed consent was obtained.      He denies a personal or family history of anesthesia complications or bleeding disorders.     Allergies   Allergen Reactions     Amlodipine Swelling     Pertussis Vaccine         No current facility-administered medications for this encounter.       Patient Active Problem List   Diagnosis     Mild persistent asthma     Allergic rhinitis     Carpal tunnel syndrome     Malaise and fatigue     MS (multiple sclerosis) (H)     Headache     CARDIOVASCULAR SCREENING; LDL GOAL LESS THAN 160     Hypertension goal BP (blood pressure) < 140/90     Fever, unknown origin        Past Medical History:   Diagnosis Date     Allergic rhinitis      Asthma 11/28/2005     Essential hypertension      Multiple sclerosis 1994        Past Surgical History:   Procedure Laterality Date     COLONOSCOPY       ENDOSCOPIC RELEASE CARPAL TUNNEL Right 06/2008     HC REPAIR OF BICEPS TENDON AT ELBOW       HERNIA REPAIR inguinal NOS Bilateral     2 separate occasions.     LASIK Bilateral        Social History     Tobacco Use     Smoking status: Never Smoker     Smokeless tobacco: Never Used   Substance Use Topics     Alcohol use: Yes     Alcohol/week: 0.0 - 1.7 standard drinks     Comment: rarely       Family History  "  Problem Relation Age of Onset     Hypertension Father      Diabetes Father         type 2     Lipids Mother      Breast Cancer Mother 67     Prostate Cancer Paternal Grandfather         dx'd late 70s     Cancer Brother 35        testicular CA       REVIEW OF SYSTEMS:     5 point ROS negative except as noted above in HPI, including Gen., Resp., CV, GI &  system review.    PHYSICAL EXAM:   /88   Pulse 80   Temp 97.8  F (36.6  C) (Temporal)   Resp 18   Ht 1.727 m (5' 8\")   Wt 79.4 kg (175 lb)   SpO2 97%   BMI 26.61 kg/m   Estimated body mass index is 26.61 kg/m  as calculated from the following:    Height as of this encounter: 1.727 m (5' 8\").    Weight as of this encounter: 79.4 kg (175 lb).   GENERAL APPEARANCE: healthy  MENTAL STATUS: alert  AIRWAY EXAM: Mallampatti Class I (visualization of the soft palate, fauces, uvula, anterior and posterior pillars)  RESP: lungs clear to auscultation - no rales, rhonchi or wheezes  CV: regular rates and rhythm    DIAGNOSTICS:      Not indicated    IMPRESSION     ASA Class 2 - Mild systemic disease    PLAN:     EGD (esophagogastroduodenoscopy)    The above has been forwarded to the consulting provider.    Signed Electronically by: Reynold Noonan MD  September 15, 2021    "

## 2021-09-16 DIAGNOSIS — G35 PRIMARY CHRONIC PROGRESSIVE MULTIPLE SCLEROSIS (H): Primary | ICD-10-CM

## 2021-09-16 LAB
PATH REPORT.COMMENTS IMP SPEC: NORMAL
PATH REPORT.COMMENTS IMP SPEC: NORMAL
PATH REPORT.FINAL DX SPEC: NORMAL
PATH REPORT.GROSS SPEC: NORMAL
PATH REPORT.MICROSCOPIC SPEC OTHER STN: NORMAL
PHOTO IMAGE: NORMAL

## 2021-09-16 PROCEDURE — 88305 TISSUE EXAM BY PATHOLOGIST: CPT | Mod: 26 | Performed by: PATHOLOGY

## 2021-09-24 ENCOUNTER — LAB (OUTPATIENT)
Dept: LAB | Facility: CLINIC | Age: 56
End: 2021-09-24
Payer: COMMERCIAL

## 2021-09-24 DIAGNOSIS — G35 PRIMARY CHRONIC PROGRESSIVE MULTIPLE SCLEROSIS (H): ICD-10-CM

## 2021-09-24 PROCEDURE — 87516 HEPATITIS B DNA AMP PROBE: CPT | Mod: 90

## 2021-09-24 PROCEDURE — 99000 SPECIMEN HANDLING OFFICE-LAB: CPT

## 2021-09-24 PROCEDURE — 87521 HEPATITIS C PROBE&RVRS TRNSC: CPT | Mod: 90

## 2021-09-24 PROCEDURE — 36415 COLL VENOUS BLD VENIPUNCTURE: CPT

## 2021-09-24 PROCEDURE — 87535 HIV-1 PROBE&REVERSE TRNSCRPJ: CPT | Mod: 90

## 2021-09-24 PROCEDURE — 82785 ASSAY OF IGE: CPT

## 2021-09-27 LAB
HBV DNA SERPL QL NAA+PROBE: NORMAL
HCV RNA SERPL QL NAA+PROBE: NORMAL
HIV1+2 RNA SERPL QL NAA+PROBE: NORMAL

## 2021-09-28 LAB — IGE SERPL-ACNC: 78 KU/L (ref 0–114)

## 2021-11-15 ENCOUNTER — TRANSFERRED RECORDS (OUTPATIENT)
Dept: HEALTH INFORMATION MANAGEMENT | Facility: CLINIC | Age: 56
End: 2021-11-15
Payer: COMMERCIAL

## 2021-12-04 DIAGNOSIS — I10 HYPERTENSION GOAL BP (BLOOD PRESSURE) < 140/90: ICD-10-CM

## 2021-12-08 RX ORDER — VERAPAMIL HYDROCHLORIDE 360 MG/1
CAPSULE, DELAYED RELEASE PELLETS ORAL
Qty: 30 CAPSULE | Refills: 0 | OUTPATIENT
Start: 2021-12-08

## 2021-12-17 ENCOUNTER — LAB (OUTPATIENT)
Dept: LAB | Facility: CLINIC | Age: 56
End: 2021-12-17
Payer: COMMERCIAL

## 2021-12-17 DIAGNOSIS — G35 MULTIPLE SCLEROSIS (H): Primary | ICD-10-CM

## 2021-12-17 DIAGNOSIS — G35 MULTIPLE SCLEROSIS (H): ICD-10-CM

## 2021-12-17 LAB
BASOPHILS # BLD AUTO: 0 10E3/UL (ref 0–0.2)
BASOPHILS NFR BLD AUTO: 1 %
EOSINOPHIL # BLD AUTO: 0.2 10E3/UL (ref 0–0.7)
EOSINOPHIL NFR BLD AUTO: 3 %
ERYTHROCYTE [DISTWIDTH] IN BLOOD BY AUTOMATED COUNT: 13 % (ref 10–15)
HCT VFR BLD AUTO: 39.8 % (ref 40–53)
HGB BLD-MCNC: 13.1 G/DL (ref 13.3–17.7)
LYMPHOCYTES # BLD AUTO: 1.6 10E3/UL (ref 0.8–5.3)
LYMPHOCYTES NFR BLD AUTO: 28 %
MCH RBC QN AUTO: 29.2 PG (ref 26.5–33)
MCHC RBC AUTO-ENTMCNC: 32.9 G/DL (ref 31.5–36.5)
MCV RBC AUTO: 89 FL (ref 78–100)
MONOCYTES # BLD AUTO: 0.9 10E3/UL (ref 0–1.3)
MONOCYTES NFR BLD AUTO: 14 %
NEUTROPHILS # BLD AUTO: 3.3 10E3/UL (ref 1.6–8.3)
NEUTROPHILS NFR BLD AUTO: 55 %
PLATELET # BLD AUTO: 252 10E3/UL (ref 150–450)
RBC # BLD AUTO: 4.48 10E6/UL (ref 4.4–5.9)
WBC # BLD AUTO: 6 10E3/UL (ref 4–11)

## 2021-12-17 PROCEDURE — 36415 COLL VENOUS BLD VENIPUNCTURE: CPT

## 2021-12-17 PROCEDURE — 80076 HEPATIC FUNCTION PANEL: CPT

## 2021-12-17 PROCEDURE — 82784 ASSAY IGA/IGD/IGG/IGM EACH: CPT

## 2021-12-17 PROCEDURE — 85025 COMPLETE CBC W/AUTO DIFF WBC: CPT

## 2021-12-17 PROCEDURE — 82785 ASSAY OF IGE: CPT

## 2021-12-18 LAB
ALBUMIN SERPL-MCNC: 4.1 G/DL (ref 3.4–5)
ALP SERPL-CCNC: 52 U/L (ref 40–150)
ALT SERPL W P-5'-P-CCNC: 34 U/L (ref 0–70)
AST SERPL W P-5'-P-CCNC: 19 U/L (ref 0–45)
BILIRUB DIRECT SERPL-MCNC: 0.2 MG/DL (ref 0–0.2)
BILIRUB SERPL-MCNC: 0.6 MG/DL (ref 0.2–1.3)
PROT SERPL-MCNC: 6.9 G/DL (ref 6.8–8.8)

## 2021-12-20 LAB
IGA SERPL-MCNC: 72 MG/DL (ref 84–499)
IGE SERPL-ACNC: 77 KU/L (ref 0–114)
IGG SERPL-MCNC: 691 MG/DL (ref 610–1616)
IGM SERPL-MCNC: 57 MG/DL (ref 35–242)

## 2021-12-25 DIAGNOSIS — I10 HYPERTENSION GOAL BP (BLOOD PRESSURE) < 140/90: ICD-10-CM

## 2021-12-27 RX ORDER — LISINOPRIL AND HYDROCHLOROTHIAZIDE 10; 12.5 MG/1; MG/1
TABLET ORAL
Qty: 90 TABLET | Refills: 0 | Status: SHIPPED | OUTPATIENT
Start: 2021-12-27 | End: 2022-02-21

## 2022-02-13 DIAGNOSIS — J45.30 MILD PERSISTENT ASTHMA WITHOUT COMPLICATION: ICD-10-CM

## 2022-02-14 NOTE — TELEPHONE ENCOUNTER
Medication is being filled for 1 time refill only due to:  Patient needs to be seen because due for an appt - has upcoming appt scheduled.  This is for singulair.    Appointments in Next Year    Feb 21, 2022  7:00 AM  (Arrive by 6:40 AM)  Provider Visit with Donnell Arroyo PA-C  [facility] ([facility] ) [phone]

## 2022-02-19 ENCOUNTER — HEALTH MAINTENANCE LETTER (OUTPATIENT)
Age: 57
End: 2022-02-19

## 2022-02-21 ENCOUNTER — MYC MEDICAL ADVICE (OUTPATIENT)
Dept: FAMILY MEDICINE | Facility: CLINIC | Age: 57
End: 2022-02-21

## 2022-02-21 ENCOUNTER — OFFICE VISIT (OUTPATIENT)
Dept: FAMILY MEDICINE | Facility: CLINIC | Age: 57
End: 2022-02-21
Payer: COMMERCIAL

## 2022-02-21 VITALS
RESPIRATION RATE: 16 BRPM | WEIGHT: 180 LBS | SYSTOLIC BLOOD PRESSURE: 124 MMHG | HEART RATE: 78 BPM | BODY MASS INDEX: 27.37 KG/M2 | DIASTOLIC BLOOD PRESSURE: 78 MMHG | TEMPERATURE: 97.8 F

## 2022-02-21 DIAGNOSIS — Z12.5 SCREENING FOR PROSTATE CANCER: ICD-10-CM

## 2022-02-21 DIAGNOSIS — G35 MS (MULTIPLE SCLEROSIS) (H): ICD-10-CM

## 2022-02-21 DIAGNOSIS — K25.9 NSAID-INDUCED GASTRIC ULCER: ICD-10-CM

## 2022-02-21 DIAGNOSIS — J45.30 MILD PERSISTENT ASTHMA WITHOUT COMPLICATION: ICD-10-CM

## 2022-02-21 DIAGNOSIS — I10 HYPERTENSION GOAL BP (BLOOD PRESSURE) < 140/90: ICD-10-CM

## 2022-02-21 DIAGNOSIS — Z00.00 ROUTINE GENERAL MEDICAL EXAMINATION AT A HEALTH CARE FACILITY: Primary | ICD-10-CM

## 2022-02-21 DIAGNOSIS — Z23 HIGH PRIORITY FOR 2019-NCOV VACCINE: ICD-10-CM

## 2022-02-21 DIAGNOSIS — T39.395A NSAID-INDUCED GASTRIC ULCER: ICD-10-CM

## 2022-02-21 LAB
CHOLEST SERPL-MCNC: 196 MG/DL
CREAT UR-MCNC: 101 MG/DL
ERYTHROCYTE [SEDIMENTATION RATE] IN BLOOD BY WESTERGREN METHOD: 4 MM/HR (ref 0–20)
FASTING STATUS PATIENT QL REPORTED: YES
HDLC SERPL-MCNC: 48 MG/DL
LDLC SERPL CALC-MCNC: 131 MG/DL
MICROALBUMIN UR-MCNC: 6 MG/L
MICROALBUMIN/CREAT UR: 5.94 MG/G CR (ref 0–17)
NONHDLC SERPL-MCNC: 148 MG/DL
PSA SERPL-MCNC: 1.13 UG/L (ref 0–4)
TRIGL SERPL-MCNC: 85 MG/DL

## 2022-02-21 PROCEDURE — 85652 RBC SED RATE AUTOMATED: CPT | Performed by: PHYSICIAN ASSISTANT

## 2022-02-21 PROCEDURE — 99214 OFFICE O/P EST MOD 30 MIN: CPT | Mod: 25 | Performed by: PHYSICIAN ASSISTANT

## 2022-02-21 PROCEDURE — 80061 LIPID PANEL: CPT | Performed by: PHYSICIAN ASSISTANT

## 2022-02-21 PROCEDURE — 99396 PREV VISIT EST AGE 40-64: CPT | Performed by: PHYSICIAN ASSISTANT

## 2022-02-21 PROCEDURE — 0013A COVID-19,PF,MODERNA (18+ YRS PRIMARY SERIES .5ML): CPT | Performed by: PHYSICIAN ASSISTANT

## 2022-02-21 PROCEDURE — 91301 COVID-19,PF,MODERNA (18+ YRS PRIMARY SERIES .5ML): CPT | Performed by: PHYSICIAN ASSISTANT

## 2022-02-21 PROCEDURE — G0103 PSA SCREENING: HCPCS | Performed by: PHYSICIAN ASSISTANT

## 2022-02-21 PROCEDURE — 82043 UR ALBUMIN QUANTITATIVE: CPT | Performed by: PHYSICIAN ASSISTANT

## 2022-02-21 PROCEDURE — 36415 COLL VENOUS BLD VENIPUNCTURE: CPT | Performed by: PHYSICIAN ASSISTANT

## 2022-02-21 RX ORDER — LISINOPRIL/HYDROCHLOROTHIAZIDE 10-12.5 MG
1 TABLET ORAL DAILY
Qty: 90 TABLET | Refills: 3 | Status: SHIPPED | OUTPATIENT
Start: 2022-02-21 | End: 2022-02-21

## 2022-02-21 RX ORDER — LISINOPRIL 10 MG/1
10 TABLET ORAL
COMMUNITY
End: 2022-02-21

## 2022-02-21 RX ORDER — RIZATRIPTAN BENZOATE 10 MG/1
TABLET, ORALLY DISINTEGRATING ORAL
COMMUNITY
Start: 2022-01-29

## 2022-02-21 RX ORDER — OMEPRAZOLE 40 MG/1
40 CAPSULE, DELAYED RELEASE ORAL DAILY
Qty: 90 CAPSULE | Refills: 1
Start: 2022-02-21

## 2022-02-21 RX ORDER — VERAPAMIL HYDROCHLORIDE 360 MG/1
360 CAPSULE, DELAYED RELEASE PELLETS ORAL DAILY
Qty: 30 CAPSULE | Refills: 3 | Status: SHIPPED | OUTPATIENT
Start: 2022-02-21 | End: 2022-02-21

## 2022-02-21 RX ORDER — MONTELUKAST SODIUM 10 MG/1
1 TABLET ORAL AT BEDTIME
Qty: 90 TABLET | Refills: 3 | Status: SHIPPED | OUTPATIENT
Start: 2022-02-21 | End: 2023-02-28

## 2022-02-21 RX ORDER — VERAPAMIL HYDROCHLORIDE 360 MG/1
360 CAPSULE, DELAYED RELEASE PELLETS ORAL DAILY
Qty: 90 CAPSULE | Refills: 2 | Status: SHIPPED | OUTPATIENT
Start: 2022-02-21 | End: 2023-02-22

## 2022-02-21 RX ORDER — LISINOPRIL/HYDROCHLOROTHIAZIDE 10-12.5 MG
1 TABLET ORAL DAILY
Qty: 90 TABLET | Refills: 3 | Status: SHIPPED | OUTPATIENT
Start: 2022-02-21 | End: 2023-02-28

## 2022-02-21 RX ORDER — MONTELUKAST SODIUM 10 MG/1
1 TABLET ORAL AT BEDTIME
Qty: 90 TABLET | Refills: 3 | Status: SHIPPED | OUTPATIENT
Start: 2022-02-21 | End: 2022-02-21

## 2022-02-21 ASSESSMENT — PAIN SCALES - GENERAL: PAINLEVEL: NO PAIN (0)

## 2022-02-21 ASSESSMENT — ASTHMA QUESTIONNAIRES: ACT_TOTALSCORE: 25

## 2022-02-21 NOTE — TELEPHONE ENCOUNTER
Resent lisinopril-hydrochlorothiazide and singulair.    Ocrelizumab was listed as historical and omeprazole - no print out.     Unable to resend verapamil due to warnings.  Will send to [facility] Barr for advisal.

## 2022-02-21 NOTE — PROGRESS NOTES
SUBJECTIVE:   CC: Sterling Canales is an 57 year old male who presents for preventative health visit.       Patient has been advised of split billing requirements and indicates understanding: Yes  History of Present Illness     Reason for visit:  Annual physical    He eats 2-3 servings of fruits and vegetables daily.He consumes 1 sweetened beverage(s) daily.He exercises with enough effort to increase his heart rate 30 to 60 minutes per day.  He exercises with enough effort to increase his heart rate 3 or less days per week.   He is taking medications regularly.    Sterling Canales is a 57 year old male who presents today for annual check up  He is feeling well overall without concerns today    Today's PHQ-2 Score:   PHQ-2 ( 1999 Pfizer) 2/20/2022   Q1: Little interest or pleasure in doing things 0   Q2: Feeling down, depressed or hopeless 0   PHQ-2 Score 0   PHQ-2 Total Score (12-17 Years)- Positive if 3 or more points; Administer PHQ-A if positive -   Q1: Little interest or pleasure in doing things Not at all   Q2: Feeling down, depressed or hopeless Not at all   PHQ-2 Score 0       Abuse: Current or Past(Physical, Sexual or Emotional)- No  Do you feel safe in your environment? Yes      Social History     Tobacco Use     Smoking status: Never Smoker     Smokeless tobacco: Never Used   Substance Use Topics     Alcohol use: Yes     Alcohol/week: 0.0 - 1.7 standard drinks     Comment: rarely     If you drink alcohol do you typically have >3 drinks per day or >7 drinks per week? No    Alcohol Use 2/21/2022   Prescreen: >3 drinks/day or >7 drinks/week? -   Prescreen: >3 drinks/day or >7 drinks/week? No       Last PSA:   PSA   Date Value Ref Range Status   01/07/2020 1.06 0 - 4 ug/L Final     Comment:     Assay Method:  Chemiluminescence using Siemens Vista analyzer       Reviewed orders with patient. Reviewed health maintenance and updated orders accordingly - Yes  Lab work is in process    Reviewed and updated as  needed this visit by clinical staff   Tobacco  Allergies  Meds   Med Hx  Surg Hx  Fam Hx  Soc Hx        Reviewed and updated as needed this visit by Provider                     Review of Systems  CONSTITUTIONAL: NEGATIVE for fever, chills, change in weight  INTEGUMENTARY/SKIN: NEGATIVE for worrisome rashes, moles or lesions  EYES: NEGATIVE for vision changes or irritation  ENT: NEGATIVE for ear, mouth and throat problems  RESP: NEGATIVE for significant cough or SOB  CV: NEGATIVE for chest pain, palpitations or peripheral edema  GI: NEGATIVE for nausea, abdominal pain, heartburn, or change in bowel habits   male: negative for dysuria, hematuria, decreased urinary stream, erectile dysfunction, urethral discharge  MUSCULOSKELETAL: NEGATIVE for significant arthralgias or myalgia  NEURO: NEGATIVE for weakness, dizziness or paresthesias  PSYCHIATRIC: NEGATIVE for changes in mood or affect    OBJECTIVE:   /78 (BP Location: Right arm, Patient Position: Sitting, Cuff Size: Adult Large)   Pulse 78   Temp 97.8  F (36.6  C) (Oral)   Resp 16   Wt 81.6 kg (180 lb)   BMI 27.37 kg/m      Physical Exam  GENERAL: healthy, alert and no distress  EYES: Eyes grossly normal to inspection, PERRL and conjunctivae and sclerae normal  HENT: ear canals and TM's normal, nose and mouth without ulcers or lesions  NECK: no adenopathy and thyroid normal to palpation  RESP: lungs clear to auscultation - no rales, rhonchi or wheezes  CV: regular rate and rhythm, normal S1 S2, no S3 or S4, no murmur, click or rub, no peripheral edema and peripheral pulses strong  ABDOMEN: soft, nontender, no hepatosplenomegaly, no masses and bowel sounds normal  MS: no gross musculoskeletal defects noted, no edema  NEURO: Normal strength and tone save for the right hand/fingers - there is weakness and limited dexterity   PSYCH: mentation appears normal, affect normal/bright    Diagnostic Test Results:  Labs reviewed in Epic  Updating  "today    ASSESSMENT/PLAN:   1. Routine general medical examination at a health care facility  Reviewed personal and family history. Reviewed age appropriate screenings. Recommended any needed vaccinations.  - Lipid panel reflex to direct LDL Non-fasting; Future  - Lipid panel reflex to direct LDL Non-fasting    2. MS (multiple sclerosis) (H)  He is now on ocrevus (ocrelizumab) twice yearly infusions. This was a change from aubagio. He is tolerating.     3. Hypertension goal BP (blood pressure) < 140/90  Controlled. Concomitant use for HA. Continue present management.   - verapamil ER (VERELAN) 360 MG 24 hr capsule; Take 1 capsule (360 mg) by mouth daily  Dispense: 30 capsule; Refill: 3  - lisinopril-hydrochlorothiazide (ZESTORETIC) 10-12.5 MG tablet; Take 1 tablet by mouth daily  Dispense: 90 tablet; Refill: 3  - Albumin Random Urine Quantitative with Creat Ratio; Future  - Albumin Random Urine Quantitative with Creat Ratio    4. Mild persistent asthma without complication  Continue present management. Well controlled. Almost never needing albuterol  - montelukast (SINGULAIR) 10 MG tablet; Take 1 tablet (10 mg) by mouth At Bedtime  Dispense: 90 tablet; Refill: 3    5. NSAID-induced gastric ulcer  Significantly improved. Repeat imaging in 12/24. Continue on below  - omeprazole (PRILOSEC) 40 MG DR capsule; Take 1 capsule (40 mg) by mouth daily  Dispense: 90 capsule; Refill: 1    6. High priority for 2019-nCoV vaccine  Considering this a 3rd dose; booster in no earlier than 3 months  - COVID-19,PF,MODERNA (18+ Yrs Primary Series .5mL)    7. Screening for prostate cancer  Updating   - PSA, screen; Future  - PSA, screen      COUNSELING:   Reviewed preventive health counseling, as reflected in patient instructions    Estimated body mass index is 27.37 kg/m  as calculated from the following:    Height as of 9/15/21: 1.727 m (5' 8\").    Weight as of this encounter: 81.6 kg (180 lb).         He reports that he has never " smoked. He has never used smokeless tobacco.      Counseling Resources:  ATP IV Guidelines  Pooled Cohorts Equation Calculator  FRAX Risk Assessment  ICSI Preventive Guidelines  Dietary Guidelines for Americans, 2010  USDA's MyPlate  ASA Prophylaxis  Lung CA Screening    HELGA Gaytan [facility]

## 2022-02-21 NOTE — PATIENT INSTRUCTIONS
Get your COVID booster in 6 months      Preventive Health Recommendations  Male Ages 50 - 64    Yearly exam:             See your health care provider every year in order to  o   Review health changes.   o   Discuss preventive care.    o   Review your medicines if your doctor has prescribed any.     Have a cholesterol test every 5 years, or more frequently if you are at risk for high cholesterol/heart disease.     Have a diabetes test (fasting glucose) every three years. If you are at risk for diabetes, you should have this test more often.     Have a colonoscopy at age 50, or have a yearly FIT test (stool test). These exams will check for colon cancer.      Talk with your health care provider about whether or not a prostate cancer screening test (PSA) is right for you.    You should be tested each year for STDs (sexually transmitted diseases), if you re at risk.     Shots: Get a flu shot each year. Get a tetanus shot every 10 years.     Nutrition:    Eat at least 5 servings of fruits and vegetables daily.     Eat whole-grain bread, whole-wheat pasta and brown rice instead of white grains and rice.     Get adequate Calcium and Vitamin D.     Lifestyle    Exercise for at least 150 minutes a week (30 minutes a day, 5 days a week). This will help you control your weight and prevent disease.     Limit alcohol to one drink per day.     No smoking.     Wear sunscreen to prevent skin cancer.     See your dentist every six months for an exam and cleaning.     See your eye doctor every 1 to 2 years.

## 2022-02-23 DIAGNOSIS — I10 HYPERTENSION GOAL BP (BLOOD PRESSURE) < 140/90: ICD-10-CM

## 2022-02-23 RX ORDER — VERAPAMIL HYDROCHLORIDE 360 MG/1
CAPSULE, DELAYED RELEASE PELLETS ORAL
Qty: 90 CAPSULE | Refills: 2 | OUTPATIENT
Start: 2022-02-23

## 2022-02-23 NOTE — TELEPHONE ENCOUNTER
Refilled 2/21/2022 for #90 with (2) additional refills to [facility].    Dian Khanna RN

## 2022-05-25 ENCOUNTER — HOSPITAL ENCOUNTER (OUTPATIENT)
Dept: GENERAL RADIOLOGY | Facility: CLINIC | Age: 57
Discharge: HOME | End: 2022-05-25
Attending: PSYCHIATRY & NEUROLOGY | Admitting: PSYCHIATRY & NEUROLOGY
Payer: COMMERCIAL

## 2022-05-25 DIAGNOSIS — G35 MULTIPLE SCLEROSIS (H): ICD-10-CM

## 2022-05-25 DIAGNOSIS — M50.30 OTHER CERVICAL DISC DEGENERATION, UNSPECIFIED CERVICAL REGION: ICD-10-CM

## 2022-05-25 PROCEDURE — 70030 X-RAY EYE FOR FOREIGN BODY: CPT

## 2022-06-20 ENCOUNTER — LAB (OUTPATIENT)
Dept: LAB | Facility: CLINIC | Age: 57
End: 2022-06-20
Payer: COMMERCIAL

## 2022-06-20 DIAGNOSIS — G35 MULTIPLE SCLEROSIS (H): ICD-10-CM

## 2022-06-20 LAB
BASOPHILS # BLD AUTO: 0 10E3/UL (ref 0–0.2)
BASOPHILS NFR BLD AUTO: 0 %
EOSINOPHIL # BLD AUTO: 0.1 10E3/UL (ref 0–0.7)
EOSINOPHIL NFR BLD AUTO: 2 %
ERYTHROCYTE [DISTWIDTH] IN BLOOD BY AUTOMATED COUNT: 12.9 % (ref 10–15)
HCT VFR BLD AUTO: 41.2 % (ref 40–53)
HGB BLD-MCNC: 13.3 G/DL (ref 13.3–17.7)
LYMPHOCYTES # BLD AUTO: 1.7 10E3/UL (ref 0.8–5.3)
LYMPHOCYTES NFR BLD AUTO: 22 %
MCH RBC QN AUTO: 29.4 PG (ref 26.5–33)
MCHC RBC AUTO-ENTMCNC: 32.3 G/DL (ref 31.5–36.5)
MCV RBC AUTO: 91 FL (ref 78–100)
MONOCYTES # BLD AUTO: 0.9 10E3/UL (ref 0–1.3)
MONOCYTES NFR BLD AUTO: 11 %
NEUTROPHILS # BLD AUTO: 5 10E3/UL (ref 1.6–8.3)
NEUTROPHILS NFR BLD AUTO: 65 %
PLATELET # BLD AUTO: 274 10E3/UL (ref 150–450)
RBC # BLD AUTO: 4.52 10E6/UL (ref 4.4–5.9)
WBC # BLD AUTO: 7.7 10E3/UL (ref 4–11)

## 2022-06-20 PROCEDURE — 36415 COLL VENOUS BLD VENIPUNCTURE: CPT

## 2022-06-20 PROCEDURE — 82785 ASSAY OF IGE: CPT

## 2022-06-20 PROCEDURE — 80076 HEPATIC FUNCTION PANEL: CPT

## 2022-06-20 PROCEDURE — 85025 COMPLETE CBC W/AUTO DIFF WBC: CPT

## 2022-06-20 PROCEDURE — 82784 ASSAY IGA/IGD/IGG/IGM EACH: CPT

## 2022-06-21 LAB
ALBUMIN SERPL-MCNC: 4.3 G/DL (ref 3.4–5)
ALP SERPL-CCNC: 61 U/L (ref 40–150)
ALT SERPL W P-5'-P-CCNC: 24 U/L (ref 0–70)
AST SERPL W P-5'-P-CCNC: 24 U/L (ref 0–45)
BILIRUB DIRECT SERPL-MCNC: <0.1 MG/DL (ref 0–0.2)
BILIRUB SERPL-MCNC: 0.4 MG/DL (ref 0.2–1.3)
PROT SERPL-MCNC: 7.2 G/DL (ref 6.8–8.8)

## 2022-06-22 LAB
IGA SERPL-MCNC: 64 MG/DL (ref 84–499)
IGE SERPL-ACNC: 102 KU/L (ref 0–114)
IGG SERPL-MCNC: 680 MG/DL (ref 610–1616)
IGM SERPL-MCNC: 50 MG/DL (ref 35–242)

## 2022-10-22 ENCOUNTER — HEALTH MAINTENANCE LETTER (OUTPATIENT)
Age: 57
End: 2022-10-22

## 2023-02-21 DIAGNOSIS — I10 HYPERTENSION GOAL BP (BLOOD PRESSURE) < 140/90: ICD-10-CM

## 2023-02-22 ENCOUNTER — THERAPY VISIT (OUTPATIENT)
Dept: PHYSICAL THERAPY | Facility: CLINIC | Age: 58
End: 2023-02-22
Payer: COMMERCIAL

## 2023-02-22 DIAGNOSIS — M54.50 BILATERAL LOW BACK PAIN: ICD-10-CM

## 2023-02-22 PROCEDURE — 97110 THERAPEUTIC EXERCISES: CPT | Mod: GP | Performed by: PHYSICAL THERAPIST

## 2023-02-22 PROCEDURE — 97112 NEUROMUSCULAR REEDUCATION: CPT | Mod: GP | Performed by: PHYSICAL THERAPIST

## 2023-02-22 PROCEDURE — 97161 PT EVAL LOW COMPLEX 20 MIN: CPT | Mod: GP | Performed by: PHYSICAL THERAPIST

## 2023-02-22 RX ORDER — VERAPAMIL HYDROCHLORIDE 360 MG/1
CAPSULE, DELAYED RELEASE PELLETS ORAL
Qty: 90 CAPSULE | Refills: 0 | Status: SHIPPED | OUTPATIENT
Start: 2023-02-22 | End: 2023-02-28

## 2023-02-22 NOTE — TELEPHONE ENCOUNTER
Routing refill request to provider for review/approval because:  Drug interaction warning  Labs not current:  BP, creatinine    Chintan VORA RN 2/22/2023 at 2:20 PM

## 2023-02-22 NOTE — PROGRESS NOTES
Physical Therapy Initial Evaluation  Subjective:  The history is provided by the patient. No  was used.   Patient Health History  Sterling Canales being seen for Lower back pain.       Problem occurred: insidious onset that has been going on for many years    Pain is reported as 7/10 on pain scale.  General health as reported by patient is good.  Pertinent medical history includes: asthma, high blood pressure, migraines/headaches, multiple sclerosis and numbness/tingling.   Red flags:  None as reported by patient.  Medical allergies: other. Other medical allergies details: Pertussis.   Surgeries include:  Orthopedic surgery.    Current medications:  Pain medication.    Current occupation is Machine Supervisor .   Primary job tasks include:  Operating a machine/assembly and prolonged standing.                  Therapist Generated HPI Evaluation  Problem details: Patient reports that he has had low back pain for many years. States the mornings are usually better and at the end of the day trying to get off the couch before bed is the worst pain. 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 States as the day goes on while at work the pain starts to increase. The days he doesn't work the pain is less intense. .         Type of problem:  Lumbar.    This is a chronic condition.  Condition occurred with:  Insidious onset.    Patient reports pain:  Central lumbar spine, lumbar spine left and lumbar spine right.  Pain is described as aching and is intermittent.  Pain radiates to:  No radiation. Pain is worse in the P.M..  Since onset symptoms are gradually worsening.  Symptoms are exacerbated by standing (transfers )  and relieved by activity/movement, heat and analgesics (bending over ).      Restrictions due to condition include:  Working in normal job without restrictions.  Barriers include:  None as reported by patient.                        Objective:  System          Lumbar/SI Evaluation  ROM:    AROM Lumbar:   Flexion:          WNL dec pain  Ext:                    Min loss inc pain    Side Bend:        Left:     Right:   Rotation:           Left:  WNL    Right:  WNL  Side Glide:        Left:     Right:         Strength: fair lower abdominal strength, difficutly with glut activation   Lumbar Myotomes:  normal                Lumbar Dermtomes:  normal                Neural Tension/Mobility:  Lumbar:  Normal        Lumbar Palpation:    Tenderness present at Left:    Quadratus Lumborum; Erector Spinae and Vertebral  Tenderness present at Right: Quadratus Lumborum; Erector Spinae and Vertebral                                                     General     ROS    Assessment/Plan:    Patient is a 58 year old male with lumbar complaints.    Patient has the following significant findings with corresponding treatment plan.                Diagnosis 1:  Low back pain  Pain -  hot/cold therapy, manual therapy, self management, education, directional preference exercise and home program  Decreased ROM/flexibility - manual therapy, therapeutic exercise, therapeutic activity and home program  Decreased strength - therapeutic exercise, therapeutic activities and home program  Impaired muscle performance - neuro re-education and home program  Decreased function - therapeutic activities and home program    Therapy Evaluation Codes:   Cumulative Therapy Evaluation is: Low complexity.    Previous and current functional limitations:  (See Goal Flow Sheet for this information)    Short term and Long term goals: (See Goal Flow Sheet for this information)     Communication ability:  Patient appears to be able to clearly communicate and understand verbal and written communication and follow directions correctly.  Treatment Explanation - The following has been discussed with the patient:   RX ordered/plan of care  Anticipated outcomes  Possible risks and side effects  This patient would benefit from  PT intervention to resume normal activities.   Rehab potential is good.    Frequency:  1 X week, once daily  Duration:  for 4 weeks tapering to 2 X a month over 4 weeks  Discharge Plan:  Achieve all LTG.  Independent in home treatment program.  Reach maximal therapeutic benefit.    Please refer to the daily flowsheet for treatment today, total treatment time and time spent performing 1:1 timed codes.

## 2023-02-27 ASSESSMENT — ENCOUNTER SYMPTOMS
PALPITATIONS: 0
PARESTHESIAS: 0
HEARTBURN: 0
HEADACHES: 1
HEMATURIA: 0
HEMATOCHEZIA: 0
NERVOUS/ANXIOUS: 0
DIZZINESS: 0
SORE THROAT: 0
FREQUENCY: 1
FEVER: 0
ARTHRALGIAS: 0
DIARRHEA: 0
CONSTIPATION: 0
ABDOMINAL PAIN: 0
NAUSEA: 0
CHILLS: 0
MYALGIAS: 1
JOINT SWELLING: 0
DYSURIA: 0
COUGH: 0
SHORTNESS OF BREATH: 0
WEAKNESS: 0
EYE PAIN: 0

## 2023-02-27 ASSESSMENT — ASTHMA QUESTIONNAIRES
QUESTION_1 LAST FOUR WEEKS HOW MUCH OF THE TIME DID YOUR ASTHMA KEEP YOU FROM GETTING AS MUCH DONE AT WORK, SCHOOL OR AT HOME: NONE OF THE TIME
QUESTION_3 LAST FOUR WEEKS HOW OFTEN DID YOUR ASTHMA SYMPTOMS (WHEEZING, COUGHING, SHORTNESS OF BREATH, CHEST TIGHTNESS OR PAIN) WAKE YOU UP AT NIGHT OR EARLIER THAN USUAL IN THE MORNING: ONCE OR TWICE
ACT_TOTALSCORE: 24
QUESTION_2 LAST FOUR WEEKS HOW OFTEN HAVE YOU HAD SHORTNESS OF BREATH: NOT AT ALL
QUESTION_4 LAST FOUR WEEKS HOW OFTEN HAVE YOU USED YOUR RESCUE INHALER OR NEBULIZER MEDICATION (SUCH AS ALBUTEROL): NOT AT ALL
QUESTION_5 LAST FOUR WEEKS HOW WOULD YOU RATE YOUR ASTHMA CONTROL: COMPLETELY CONTROLLED
ACT_TOTALSCORE: 24

## 2023-02-28 ENCOUNTER — OFFICE VISIT (OUTPATIENT)
Dept: FAMILY MEDICINE | Facility: CLINIC | Age: 58
End: 2023-02-28
Payer: COMMERCIAL

## 2023-02-28 VITALS
BODY MASS INDEX: 28.04 KG/M2 | SYSTOLIC BLOOD PRESSURE: 130 MMHG | TEMPERATURE: 98.3 F | HEART RATE: 80 BPM | OXYGEN SATURATION: 96 % | HEIGHT: 68 IN | WEIGHT: 185 LBS | DIASTOLIC BLOOD PRESSURE: 72 MMHG | RESPIRATION RATE: 19 BRPM

## 2023-02-28 DIAGNOSIS — T39.395A NSAID-INDUCED GASTRIC ULCER: ICD-10-CM

## 2023-02-28 DIAGNOSIS — Z23 HIGH PRIORITY FOR 2019-NCOV VACCINE: ICD-10-CM

## 2023-02-28 DIAGNOSIS — L30.9 ECZEMA, UNSPECIFIED TYPE: ICD-10-CM

## 2023-02-28 DIAGNOSIS — J45.30 MILD PERSISTENT ASTHMA WITHOUT COMPLICATION: ICD-10-CM

## 2023-02-28 DIAGNOSIS — G35 MS (MULTIPLE SCLEROSIS) (H): ICD-10-CM

## 2023-02-28 DIAGNOSIS — Z00.00 ROUTINE GENERAL MEDICAL EXAMINATION AT A HEALTH CARE FACILITY: Primary | ICD-10-CM

## 2023-02-28 DIAGNOSIS — I10 HYPERTENSION GOAL BP (BLOOD PRESSURE) < 140/90: ICD-10-CM

## 2023-02-28 DIAGNOSIS — K25.9 NSAID-INDUCED GASTRIC ULCER: ICD-10-CM

## 2023-02-28 PROCEDURE — 99396 PREV VISIT EST AGE 40-64: CPT | Mod: 25 | Performed by: PHYSICIAN ASSISTANT

## 2023-02-28 PROCEDURE — 90471 IMMUNIZATION ADMIN: CPT | Performed by: PHYSICIAN ASSISTANT

## 2023-02-28 PROCEDURE — 91313 COVID-19 VACCINE BIVALENT BOOSTER 18+ (MODERNA): CPT | Performed by: PHYSICIAN ASSISTANT

## 2023-02-28 PROCEDURE — 99213 OFFICE O/P EST LOW 20 MIN: CPT | Mod: 25 | Performed by: PHYSICIAN ASSISTANT

## 2023-02-28 PROCEDURE — 90677 PCV20 VACCINE IM: CPT | Performed by: PHYSICIAN ASSISTANT

## 2023-02-28 PROCEDURE — 0134A COVID-19 VACCINE BIVALENT BOOSTER 18+ (MODERNA): CPT | Performed by: PHYSICIAN ASSISTANT

## 2023-02-28 RX ORDER — GABAPENTIN 100 MG/1
100 CAPSULE ORAL PRN
Qty: 90 CAPSULE | Refills: 0 | Status: SHIPPED | OUTPATIENT
Start: 2023-02-28

## 2023-02-28 RX ORDER — OMEPRAZOLE 40 MG/1
40 CAPSULE, DELAYED RELEASE ORAL DAILY
Qty: 90 CAPSULE | Refills: 1 | Status: CANCELLED | OUTPATIENT
Start: 2023-02-28

## 2023-02-28 RX ORDER — RIZATRIPTAN BENZOATE 10 MG/1
TABLET, ORALLY DISINTEGRATING ORAL
Status: CANCELLED | OUTPATIENT
Start: 2023-02-28

## 2023-02-28 RX ORDER — VERAPAMIL HYDROCHLORIDE 360 MG/1
360 CAPSULE, DELAYED RELEASE PELLETS ORAL DAILY
Qty: 90 CAPSULE | Refills: 3 | Status: SHIPPED | OUTPATIENT
Start: 2023-02-28 | End: 2024-03-03

## 2023-02-28 RX ORDER — MONTELUKAST SODIUM 10 MG/1
1 TABLET ORAL AT BEDTIME
Qty: 90 TABLET | Refills: 3 | Status: SHIPPED | OUTPATIENT
Start: 2023-02-28 | End: 2024-01-22

## 2023-02-28 RX ORDER — LISINOPRIL/HYDROCHLOROTHIAZIDE 10-12.5 MG
1 TABLET ORAL DAILY
Qty: 90 TABLET | Refills: 3 | Status: SHIPPED | OUTPATIENT
Start: 2023-02-28 | End: 2024-02-28

## 2023-02-28 ASSESSMENT — ENCOUNTER SYMPTOMS
COUGH: 0
WEAKNESS: 0
ARTHRALGIAS: 0
DIARRHEA: 0
NAUSEA: 0
HEMATURIA: 0
DYSURIA: 0
JOINT SWELLING: 0
FREQUENCY: 1
FEVER: 0
DIZZINESS: 0
PALPITATIONS: 0
SHORTNESS OF BREATH: 0
MYALGIAS: 1
EYE PAIN: 0
PARESTHESIAS: 0
SORE THROAT: 0
HEADACHES: 1
HEARTBURN: 0
CHILLS: 0
CONSTIPATION: 0
HEMATOCHEZIA: 0
ABDOMINAL PAIN: 0
NERVOUS/ANXIOUS: 0

## 2023-02-28 ASSESSMENT — PAIN SCALES - GENERAL: PAINLEVEL: NO PAIN (0)

## 2023-02-28 NOTE — PROGRESS NOTES
SUBJECTIVE:   CC: Sterling is an 58 year old who presents for preventative health visit.     Patient has been advised of split billing requirements and indicates understanding: Yes  Healthy Habits:     Getting at least 3 servings of Calcium per day:  Yes    Bi-annual eye exam:  Yes    Dental care twice a year:  Yes    Sleep apnea or symptoms of sleep apnea:  Daytime drowsiness    Diet:  Regular (no restrictions)    Frequency of exercise:  None    Taking medications regularly:  Yes    Medication side effects:  None    PHQ-2 Total Score: 0    Sterling Canales is a 58 year old male who presents today for annual check up  Feels well overall  Seeing physical therapy next door; low back pain  Never any sciatic symptoms     Continues with neurology; on twice yearly injection ocrelizumab  No advancements/changes     On omeprazole; no gastric symptoms  Avoiding nsaids    HA controlled overall; infrequent use of abortive; continues on verapamil      Today's PHQ-2 Score:   PHQ-2 ( 1999 Pfizer) 2/27/2023   Q1: Little interest or pleasure in doing things 0   Q2: Feeling down, depressed or hopeless 0   PHQ-2 Score 0   PHQ-2 Total Score (12-17 Years)- Positive if 3 or more points; Administer PHQ-A if positive -   Q1: Little interest or pleasure in doing things Not at all   Q2: Feeling down, depressed or hopeless Not at all   PHQ-2 Score 0       Have you ever done Advance Care Planning? (For example, a Health Directive, POLST, or a discussion with a medical provider or your loved ones about your wishes): No, advance care planning information given to patient to review.  Patient declined advance care planning discussion at this time.    Social History     Tobacco Use     Smoking status: Never     Smokeless tobacco: Never   Substance Use Topics     Alcohol use: Yes     Alcohol/week: 0.0 - 1.7 standard drinks     Comment: rarely         Alcohol Use 2/27/2023   Prescreen: >3 drinks/day or >7 drinks/week? No   No flowsheet data  "found.    Last PSA:   PSA   Date Value Ref Range Status   01/07/2020 1.06 0 - 4 ug/L Final     Comment:     Assay Method:  Chemiluminescence using Siemens Vista analyzer     Prostate Specific Antigen Screen   Date Value Ref Range Status   02/21/2022 1.13 0.00 - 4.00 ug/L Final       Reviewed orders with patient. Reviewed health maintenance and updated orders accordingly - Yes  Labs reviewed in EPIC    Reviewed and updated as needed this visit by clinical staff   Tobacco  Allergies  Meds              Reviewed and updated as needed this visit by Provider                     Review of Systems   Constitutional: Negative for chills and fever.   HENT: Positive for hearing loss. Negative for congestion, ear pain and sore throat.    Eyes: Negative for pain and visual disturbance.   Respiratory: Negative for cough and shortness of breath.    Cardiovascular: Negative for chest pain, palpitations and peripheral edema.   Gastrointestinal: Negative for abdominal pain, constipation, diarrhea, heartburn, hematochezia and nausea.   Genitourinary: Positive for frequency. Negative for dysuria, genital sores, hematuria, impotence, penile discharge and urgency.   Musculoskeletal: Positive for myalgias. Negative for arthralgias and joint swelling.   Skin: Negative for rash.   Neurological: Positive for headaches. Negative for dizziness, weakness and paresthesias.   Psychiatric/Behavioral: Negative for mood changes. The patient is not nervous/anxious.        OBJECTIVE:   /72 (BP Location: Right arm, Patient Position: Sitting, Cuff Size: Adult Regular)   Pulse 80   Temp 98.3  F (36.8  C) (Oral)   Resp 19   Ht 1.735 m (5' 8.3\")   Wt 83.9 kg (185 lb)   SpO2 96%   BMI 27.88 kg/m      Physical Exam  GENERAL: healthy, alert and no distress  EYES: Eyes grossly normal to inspection, PERRL and conjunctivae and sclerae normal  HENT: ear canals and TM's normal, nose and mouth without ulcers or lesions  NECK: no adenopathy, no " asymmetry, masses, or scars and thyroid normal to palpation  RESP: lungs clear to auscultation - no rales, rhonchi or wheezes  CV: regular rate and rhythm, normal S1 S2, no S3 or S4, no murmur, click or rub, no peripheral edema and peripheral pulses strong  ABDOMEN: soft, nontender, no hepatosplenomegaly, no masses and bowel sounds normal  MS: no gross musculoskeletal defects noted, no edema  NEURO: Normal strength and tone, mentation intact and speech normal  BACK: no paralumbar tenderness; no SLR  PSYCH: mentation appears normal, affect normal/bright    Diagnostic Test Results:  Labs reviewed in Epic  Will update when fasting     ASSESSMENT/PLAN:   1. Routine general medical examination at a health care facility  Reviewed personal and family history. Reviewed age appropriate screenings. Recommended any needed vaccinations.  - Lipid panel reflex to direct LDL Fasting; Future  - PNEUMOCOCCAL 20 VALENT CONJUGATE (PREVNAR 20)    2. MS (multiple sclerosis) (H)  Continues with outside neurology - Dr. Alejandre. Twice yearly injection - ocrelizumab  - gabapentin (NEURONTIN) 100 MG capsule; Take 1 capsule (100 mg) by mouth as needed  Dispense: 90 capsule; Refill: 0    3. Hypertension goal BP (blood pressure) < 140/90  Controlled. Concomitant use of verapamil for HA. Continue present management.   - lisinopril-hydrochlorothiazide (ZESTORETIC) 10-12.5 MG tablet; Take 1 tablet by mouth daily  Dispense: 90 tablet; Refill: 3  - verapamil ER (VERELAN) 360 MG 24 hr capsule; Take 1 capsule (360 mg) by mouth daily  Dispense: 90 capsule; Refill: 3  - Basic metabolic panel  (Ca, Cl, CO2, Creat, Gluc, K, Na, BUN); Future  - Albumin Random Urine Quantitative with Creat Ratio; Future    4. Mild persistent asthma without complication  Contrlled. Nearly never using albuterl. Continue present management.   - montelukast (SINGULAIR) 10 MG tablet; Take 1 tablet (10 mg) by mouth At Bedtime  Dispense: 90 tablet; Refill: 3    5. NSAID-induced  gastric ulcer  Stable. On PPI. Repeat EGD 12/24    6. High priority for 2019-nCoV vaccine  - COVID-19,PF,MODERNA BIVALENT 18+Yrs    7. Eczema, unspecified type  For prn use  - triamcinolone (KENALOG) 0.1 % external ointment; Apply topically 2 times daily  Dispense: 30 g; Refill: 0      COUNSELING:   Reviewed preventive health counseling, as reflected in patient instructions        He reports that he has never smoked. He has never used smokeless tobacco.            HELGA Gaytan [facility]

## 2023-03-01 RX ORDER — TRIAMCINOLONE ACETONIDE 1 MG/G
OINTMENT TOPICAL 2 TIMES DAILY
Qty: 30 G | Refills: 0 | Status: SHIPPED | OUTPATIENT
Start: 2023-03-01

## 2023-03-07 ENCOUNTER — TRANSCRIBE ORDERS (OUTPATIENT)
Dept: OTHER | Age: 58
End: 2023-03-07

## 2023-03-10 ENCOUNTER — THERAPY VISIT (OUTPATIENT)
Dept: PHYSICAL THERAPY | Facility: CLINIC | Age: 58
End: 2023-03-10
Payer: COMMERCIAL

## 2023-03-10 DIAGNOSIS — M54.50 BILATERAL LOW BACK PAIN: Primary | ICD-10-CM

## 2023-03-10 PROCEDURE — 97110 THERAPEUTIC EXERCISES: CPT | Mod: GP | Performed by: PHYSICAL THERAPIST

## 2023-03-10 PROCEDURE — 97112 NEUROMUSCULAR REEDUCATION: CPT | Mod: GP | Performed by: PHYSICAL THERAPIST

## 2023-03-14 ENCOUNTER — LAB (OUTPATIENT)
Dept: LAB | Facility: CLINIC | Age: 58
End: 2023-03-14
Payer: COMMERCIAL

## 2023-03-14 DIAGNOSIS — I10 HYPERTENSION GOAL BP (BLOOD PRESSURE) < 140/90: ICD-10-CM

## 2023-03-14 DIAGNOSIS — Z00.00 ROUTINE GENERAL MEDICAL EXAMINATION AT A HEALTH CARE FACILITY: ICD-10-CM

## 2023-03-14 LAB
ANION GAP SERPL CALCULATED.3IONS-SCNC: 13 MMOL/L (ref 7–15)
BUN SERPL-MCNC: 14.6 MG/DL (ref 6–20)
CALCIUM SERPL-MCNC: 9.8 MG/DL (ref 8.6–10)
CHLORIDE SERPL-SCNC: 104 MMOL/L (ref 98–107)
CHOLEST SERPL-MCNC: 192 MG/DL
CREAT SERPL-MCNC: 1 MG/DL (ref 0.67–1.17)
CREAT UR-MCNC: 95.9 MG/DL
DEPRECATED HCO3 PLAS-SCNC: 27 MMOL/L (ref 22–29)
GFR SERPL CREATININE-BSD FRML MDRD: 88 ML/MIN/1.73M2
GLUCOSE SERPL-MCNC: 94 MG/DL (ref 70–99)
HDLC SERPL-MCNC: 46 MG/DL
LDLC SERPL CALC-MCNC: 118 MG/DL
MICROALBUMIN UR-MCNC: <12 MG/L
MICROALBUMIN/CREAT UR: NORMAL MG/G{CREAT}
NONHDLC SERPL-MCNC: 146 MG/DL
POTASSIUM SERPL-SCNC: 4.6 MMOL/L (ref 3.4–5.3)
SODIUM SERPL-SCNC: 144 MMOL/L (ref 136–145)
TRIGL SERPL-MCNC: 140 MG/DL

## 2023-03-14 PROCEDURE — 82043 UR ALBUMIN QUANTITATIVE: CPT

## 2023-03-14 PROCEDURE — 82570 ASSAY OF URINE CREATININE: CPT

## 2023-03-14 PROCEDURE — 36415 COLL VENOUS BLD VENIPUNCTURE: CPT

## 2023-03-14 PROCEDURE — 80048 BASIC METABOLIC PNL TOTAL CA: CPT

## 2023-03-14 PROCEDURE — 80061 LIPID PANEL: CPT

## 2023-03-17 ENCOUNTER — THERAPY VISIT (OUTPATIENT)
Dept: PHYSICAL THERAPY | Facility: CLINIC | Age: 58
End: 2023-03-17
Payer: COMMERCIAL

## 2023-03-17 DIAGNOSIS — M54.50 BILATERAL LOW BACK PAIN: Primary | ICD-10-CM

## 2023-03-17 PROCEDURE — 97110 THERAPEUTIC EXERCISES: CPT | Mod: GP | Performed by: PHYSICAL THERAPIST

## 2023-03-17 PROCEDURE — 97140 MANUAL THERAPY 1/> REGIONS: CPT | Mod: GP | Performed by: PHYSICAL THERAPIST

## 2023-03-17 PROCEDURE — 97112 NEUROMUSCULAR REEDUCATION: CPT | Mod: GP | Performed by: PHYSICAL THERAPIST

## 2023-04-13 ENCOUNTER — THERAPY VISIT (OUTPATIENT)
Dept: PHYSICAL THERAPY | Facility: CLINIC | Age: 58
End: 2023-04-13
Payer: COMMERCIAL

## 2023-04-13 DIAGNOSIS — M54.50 BILATERAL LOW BACK PAIN: Primary | ICD-10-CM

## 2023-04-13 PROCEDURE — 97110 THERAPEUTIC EXERCISES: CPT | Mod: GP | Performed by: PHYSICAL THERAPIST

## 2023-04-13 PROCEDURE — 97112 NEUROMUSCULAR REEDUCATION: CPT | Mod: GP | Performed by: PHYSICAL THERAPIST

## 2023-06-02 NOTE — PROGRESS NOTES
Discharge Note      Sterling failed to follow up and current status is unknown.  Please see information below for last relevant information on current status.  Patient seen for 4 visits.    SUBJECTIVE  Subjective changes noted by patient:  States about 1 wee ago he was having a lot of pain and states when she does the stretches he feels better. Then over the weekend he injured his calf and got sick. Was unable to work 2 days and now has worked 2 days and is feeling really good today. He is unsure if his back is getting better or because he didnt work on Mon and Tue.  .  Changes in function:  Yes (See Goal flowsheet attached for changes in current functional level)  Adverse reaction to treatment or activity: None    OBJECTIVE  Changes noted in objective findings: having better R glut activation but weaker on right core muscles compared to the left     ASSESSMENT/PLAN  Diagnosis: LBP   STG/LTGs have been met or progress has been made towards goals:  Yes, please see goal flowsheet for most current information  Assessment of Progress: current status is unknown.    Last current status: Pt is progressing as expected   Self Management Plans:  HEP  I have re-evaluated this patient and find that the nature, scope, duration and intensity of the therapy is appropriate for the medical condition of the patient.  Sterling continues to require the following intervention to meet STG and LTG's:  HEP.    Recommendations:  Discharge with current home program.  Patient to follow up with MD as needed.    Please refer to the daily flowsheet for treatment today, total treatment time and time spent performing 1:1 timed codes.

## 2023-08-29 ENCOUNTER — MYC MEDICAL ADVICE (OUTPATIENT)
Dept: FAMILY MEDICINE | Facility: CLINIC | Age: 58
End: 2023-08-29
Payer: COMMERCIAL

## 2023-08-30 NOTE — TELEPHONE ENCOUNTER
Patient calling back.  Discussed ice and ibuprofen.  Can not take NSAID's due to past ulcer.  Discussed Tylenol is other option but not an NSAID.  Discussed plantar fascitis insert.  States he has tired in past and then causes pain in another area and goes back and forth.  Will ice and monitor and come for visit if ongoing issues.  Patient agrees with plan.  Zayda Hayes RN

## 2023-11-17 ENCOUNTER — TRANSFERRED RECORDS (OUTPATIENT)
Dept: HEALTH INFORMATION MANAGEMENT | Facility: CLINIC | Age: 58
End: 2023-11-17
Payer: COMMERCIAL

## 2024-01-22 DIAGNOSIS — J45.30 MILD PERSISTENT ASTHMA WITHOUT COMPLICATION: ICD-10-CM

## 2024-02-13 ENCOUNTER — MYC MEDICAL ADVICE (OUTPATIENT)
Dept: FAMILY MEDICINE | Facility: CLINIC | Age: 59
End: 2024-02-13
Payer: COMMERCIAL

## 2024-02-13 NOTE — TELEPHONE ENCOUNTER
Talked to Kennedy Arroyo.  He advised pt would need a visit.  They could discuss then as budesonide is something that is usually ordered by GI.    Called and left the pt a message to call back.  Currently Kennedy has a video visit available yet today.

## 2024-02-14 NOTE — TELEPHONE ENCOUNTER
Reason for call:  Patient reporting a symptom    Symptom or request: Diarrhea, possible colitis flare up    Duration (how long have symptoms been present): 2 weeks    Have you been treated for this before? Yes    Additional comments: Patient states he waited for 30 minutes yesterday for evisit and no one ever reached out to him. Please call    Phone Number patient can be reached at:  Cell number on file:    Telephone Information:   Mobile [phone]       Best Time:  anytime    Can we leave a detailed message on this number:  YES    Call taken on 2/14/2024 at 11:20 AM by Riddhi Montoya

## 2024-02-14 NOTE — TELEPHONE ENCOUNTER
Called the pt to discuss.      His work changed insurance.  Pt is home today.  Offered to set up appt, but Kennedy is out of the office today.  He said he will reach out to [facility] then today since he is home.    Advised to let us know if he needs further assistance.

## 2024-02-25 ASSESSMENT — ASTHMA QUESTIONNAIRES
QUESTION_3 LAST FOUR WEEKS HOW OFTEN DID YOUR ASTHMA SYMPTOMS (WHEEZING, COUGHING, SHORTNESS OF BREATH, CHEST TIGHTNESS OR PAIN) WAKE YOU UP AT NIGHT OR EARLIER THAN USUAL IN THE MORNING: NOT AT ALL
QUESTION_4 LAST FOUR WEEKS HOW OFTEN HAVE YOU USED YOUR RESCUE INHALER OR NEBULIZER MEDICATION (SUCH AS ALBUTEROL): NOT AT ALL
QUESTION_2 LAST FOUR WEEKS HOW OFTEN HAVE YOU HAD SHORTNESS OF BREATH: NOT AT ALL
QUESTION_1 LAST FOUR WEEKS HOW MUCH OF THE TIME DID YOUR ASTHMA KEEP YOU FROM GETTING AS MUCH DONE AT WORK, SCHOOL OR AT HOME: NONE OF THE TIME
QUESTION_5 LAST FOUR WEEKS HOW WOULD YOU RATE YOUR ASTHMA CONTROL: WELL CONTROLLED
ACT_TOTALSCORE: 24
ACT_TOTALSCORE: 24

## 2024-02-28 DIAGNOSIS — I10 HYPERTENSION GOAL BP (BLOOD PRESSURE) < 140/90: ICD-10-CM

## 2024-02-28 RX ORDER — LISINOPRIL AND HYDROCHLOROTHIAZIDE 10; 12.5 MG/1; MG/1
1 TABLET ORAL DAILY
Qty: 90 TABLET | Refills: 0 | Status: SHIPPED | OUTPATIENT
Start: 2024-02-28 | End: 2024-03-03

## 2024-02-29 ENCOUNTER — OFFICE VISIT (OUTPATIENT)
Dept: FAMILY MEDICINE | Facility: CLINIC | Age: 59
End: 2024-02-29
Payer: COMMERCIAL

## 2024-02-29 VITALS
TEMPERATURE: 97.8 F | WEIGHT: 168 LBS | HEART RATE: 79 BPM | DIASTOLIC BLOOD PRESSURE: 70 MMHG | HEIGHT: 68 IN | RESPIRATION RATE: 13 BRPM | OXYGEN SATURATION: 98 % | SYSTOLIC BLOOD PRESSURE: 111 MMHG | BODY MASS INDEX: 25.46 KG/M2

## 2024-02-29 DIAGNOSIS — G35 MS (MULTIPLE SCLEROSIS) (H): ICD-10-CM

## 2024-02-29 DIAGNOSIS — I10 HYPERTENSION GOAL BP (BLOOD PRESSURE) < 140/90: ICD-10-CM

## 2024-02-29 DIAGNOSIS — Z12.5 SCREENING FOR PROSTATE CANCER: ICD-10-CM

## 2024-02-29 DIAGNOSIS — K52.9 COLITIS: ICD-10-CM

## 2024-02-29 DIAGNOSIS — J45.30 MILD PERSISTENT ASTHMA WITHOUT COMPLICATION: ICD-10-CM

## 2024-02-29 DIAGNOSIS — Z00.00 ROUTINE GENERAL MEDICAL EXAMINATION AT A HEALTH CARE FACILITY: Primary | ICD-10-CM

## 2024-02-29 PROCEDURE — 80061 LIPID PANEL: CPT | Performed by: PHYSICIAN ASSISTANT

## 2024-02-29 PROCEDURE — G0103 PSA SCREENING: HCPCS | Performed by: PHYSICIAN ASSISTANT

## 2024-02-29 PROCEDURE — 90746 HEPB VACCINE 3 DOSE ADULT IM: CPT | Performed by: PHYSICIAN ASSISTANT

## 2024-02-29 PROCEDURE — 80048 BASIC METABOLIC PNL TOTAL CA: CPT | Performed by: PHYSICIAN ASSISTANT

## 2024-02-29 PROCEDURE — 90471 IMMUNIZATION ADMIN: CPT | Performed by: PHYSICIAN ASSISTANT

## 2024-02-29 PROCEDURE — 99214 OFFICE O/P EST MOD 30 MIN: CPT | Mod: 25 | Performed by: PHYSICIAN ASSISTANT

## 2024-02-29 PROCEDURE — 82570 ASSAY OF URINE CREATININE: CPT | Performed by: PHYSICIAN ASSISTANT

## 2024-02-29 PROCEDURE — 82043 UR ALBUMIN QUANTITATIVE: CPT | Performed by: PHYSICIAN ASSISTANT

## 2024-02-29 PROCEDURE — 36415 COLL VENOUS BLD VENIPUNCTURE: CPT | Performed by: PHYSICIAN ASSISTANT

## 2024-02-29 PROCEDURE — 99396 PREV VISIT EST AGE 40-64: CPT | Mod: 25 | Performed by: PHYSICIAN ASSISTANT

## 2024-02-29 RX ORDER — VERAPAMIL HYDROCHLORIDE 180 MG/1
180 TABLET, EXTENDED RELEASE ORAL AT BEDTIME
Qty: 90 TABLET | Refills: 0 | Status: SHIPPED | OUTPATIENT
Start: 2024-02-29 | End: 2024-05-28

## 2024-02-29 RX ORDER — MONTELUKAST SODIUM 10 MG/1
1 TABLET ORAL AT BEDTIME
Qty: 90 TABLET | Refills: 3 | Status: SHIPPED | OUTPATIENT
Start: 2024-02-29

## 2024-02-29 NOTE — PATIENT INSTRUCTIONS
Preventive Care Advice   This is general advice given by our system to help you stay healthy. However, your care team may have specific advice just for you. Please talk to your care team about your preventive care needs.  Nutrition  Eat 5 or more servings of fruits and vegetables each day.  Try wheat bread, brown rice and whole grain pasta (instead of white bread, rice, and pasta).  Get enough calcium and vitamin D. Check the label on foods and aim for 100% of the RDA (recommended daily allowance).  Lifestyle  Exercise at least 150 minutes each week   (30 minutes a day, 5 days a week).  Do muscle strengthening activities 2 days a week. These help control your weight and prevent disease.  No smoking.  Wear sunscreen to prevent skin cancer.  Have a dental exam and cleaning every 6 months.  Yearly exams  See your health care team every year to talk about:  Any changes in your health.  Any medicines your care team has prescribed.  Preventive care, family planning, and ways to prevent chronic diseases.  Shots (vaccines)   HPV shots (up to age 26), if you've never had them before.  Hepatitis B shots (up to age 59), if you've never had them before.  COVID-19 shot: Get this shot when it's due.  Flu shot: Get a flu shot every year.  Tetanus shot: Get a tetanus shot every 10 years.  Pneumococcal, hepatitis A, and RSV shots: Ask your care team if you need these based on your risk.  Shingles shot (for age 50 and up).  General health tests  Diabetes screening:  Starting at age 35, Get screened for diabetes at least every 3 years.  If you are younger than age 35, ask your care team if you should be screened for diabetes.  Cholesterol test: At age 39, start having a cholesterol test every 5 years, or more often if advised.  Bone density scan (DEXA): At age 50, ask your care team if you should have this scan for osteoporosis (brittle bones).  Hepatitis C: Get tested at least once in your life.  STIs (sexually transmitted  infections)  Before age 24: Ask your care team if you should be screened for STIs.  After age 24: Get screened for STIs if you're at risk. You are at risk for STIs (including HIV) if:  You are sexually active with more than one person.  You don't use condoms every time.  You or a partner was diagnosed with a sexually transmitted infection.  If you are at risk for HIV, ask about PrEP medicine to prevent HIV.  Get tested for HIV at least once in your life, whether you are at risk for HIV or not.  Cancer screening tests  Cervical cancer screening: If you have a cervix, begin getting regular cervical cancer screening tests at age 21. Most people who have regular screenings with normal results can stop after age 65. Talk about this with your provider.  Breast cancer scan (mammogram): If you've ever had breasts, begin having regular mammograms starting at age 40. This is a scan to check for breast cancer.  Colon cancer screening: It is important to start screening for colon cancer at age 45.  Have a colonoscopy test every 10 years (or more often if you're at risk) Or, ask your provider about stool tests like a FIT test every year or Cologuard test every 3 years.  To learn more about your testing options, visit: https://www.[employer]/855265.pdf.  For help making a decision, visit: https://bit.ly/kv98375.  Prostate cancer screening test: If you have a prostate and are age 55 to 69, ask your provider if you would benefit from a yearly prostate cancer screening test.  Lung cancer screening: If you are a current or former smoker age 50 to 80, ask your care team if ongoing lung cancer screenings are right for you.  For informational purposes only. Not to replace the advice of your health care provider. Copyright   2023 [employer]. All rights reserved. Clinically reviewed by the [facility]. Vitrina [id number] - REV 01/24.

## 2024-02-29 NOTE — PROGRESS NOTES
"Preventive Care Visit  [facility] MAHENDRA Arroyo PA-C, Family Medicine  Feb 29, 2024    Assessment & Plan     Routine general medical examination at a health care facility  Reviewed personal and family history. Reviewed age appropriate screenings. Recommended any needed vaccinations.  - Lipid panel reflex to direct LDL Non-fasting; Future  - Lipid panel reflex to direct LDL Non-fasting    Mild persistent asthma without complication  Controlled. Continue present management.   - montelukast (SINGULAIR) 10 MG tablet; Take 1 tablet (10 mg) by mouth at bedtime    MS (multiple sclerosis) (H)  Following with neurology with twice yearly ocrelizumab    Hypertension goal BP (blood pressure) < 140/90  Controlled. Ok with weight loss to try reducing verapamil. He'll let me know   - verapamil ER (CALAN-SR) 180 MG CR tablet; Take 1 tablet (180 mg) by mouth at bedtime  - Basic metabolic panel  (Ca, Cl, CO2, Creat, Gluc, K, Na, BUN); Future  - Albumin Random Urine Quantitative with Creat Ratio; Future  - Basic metabolic panel  (Ca, Cl, CO2, Creat, Gluc, K, Na, BUN)  - Albumin Random Urine Quantitative with Creat Ratio    Colitis  Has GI visit tomorrow. Has needed budesonide previously    Screening for prostate cancer  - PSA, screen; Future  - PSA, screen        BMI  Estimated body mass index is 25.32 kg/m  as calculated from the following:    Height as of this encounter: 1.735 m (5' 8.3\").    Weight as of this encounter: 76.2 kg (168 lb).       Counseling  Appropriate preventive services were discussed with this patient, including applicable screening as appropriate for fall prevention, nutrition, physical activity, Tobacco-use cessation, weight loss and cognition.  Checklist reviewing preventive services available has been given to the patient.  Reviewed patient's diet, addressing concerns and/or questions.   He is at risk for lack of exercise and has been provided with information to increase " physical activity for the benefit of his well-being.         Millicent Katz is a 59 year old, presenting for the following:  Physical        2/29/2024     4:14 PM   Additional Questions   Roomed by Teresa [facility]          Believes he has having a flare up of his colitis because he has dirrhea for the last 30 days. Seeing his GI doctor tomorrow.  Plenty of watery stools    BP very nicely controlled with weight loss  Has cut sugar out from the diet, low carb  Would like to decrease verapamil or one of his BP meds?      HPI        2/25/2024   General Health   How would you rate your overall physical health? Good   Feel stress (tense, anxious, or unable to sleep) Not at all         2/25/2024   Nutrition   Three or more servings of calcium each day? Yes   Diet: Other   If other, please elaborate: 16/8 intermittent fasting   How many servings of fruit and vegetables per day? (!) 0-1   How many sweetened beverages each day? 0-1         2/25/2024   Exercise   Days per week of moderate/strenous exercise 3 days   Average minutes spent exercising at this level 30 min         2/25/2024   Social Factors   Frequency of gathering with friends or relatives Once a week   Worry food won't last until get money to buy more No   Food not last or not have enough money for food? No   Do you have housing?  Yes   Are you worried about losing your housing? No   Lack of transportation? No   Unable to get utilities (heat,electricity)? No         2/25/2024   Fall Risk   Fallen 2 or more times in the past year? No   Trouble with walking or balance? No          2/25/2024   Dental   Dentist two times every year? Yes            Today's PHQ-2 Score:       2/28/2024     5:44 PM   PHQ-2 ( 1999 Pfizer)   Q1: Little interest or pleasure in doing things 0   Q2: Feeling down, depressed or hopeless 0   PHQ-2 Score 0   Q1: Little interest or pleasure in doing things Not at all   Q2: Feeling down, depressed or hopeless Not at all   PHQ-2 Score 0            "2/25/2024   Substance Use   Alcohol more than 3/day or more than 7/wk No   Do you use any other substances recreationally? No     Social History     Tobacco Use    Smoking status: Never    Smokeless tobacco: Never   Vaping Use    Vaping Use: Never used   Substance Use Topics    Alcohol use: Yes     Alcohol/week: 0.0 - 1.7 standard drinks of alcohol     Comment: rarely    Drug use: No           2/25/2024   STI Screening   New sexual partner(s) since last STI/HIV test? No   Last PSA:   PSA   Date Value Ref Range Status   01/07/2020 1.06 0 - 4 ug/L Final     Comment:     Assay Method:  Chemiluminescence using Siemens Vista analyzer     Prostate Specific Antigen Screen   Date Value Ref Range Status   02/21/2022 1.13 0.00 - 4.00 ug/L Final     ASCVD Risk   The 10-year ASCVD risk score (Michelle HERCULES, et al., 2019) is: 6.2%    Values used to calculate the score:      Age: 59 years      Sex: Male      Is Non- : No      Diabetic: No      Tobacco smoker: No      Systolic Blood Pressure: 111 mmHg      Is BP treated: Yes      HDL Cholesterol: 46 mg/dL      Total Cholesterol: 192 mg/dL           Reviewed and updated as needed this visit by Provider                    Lab work is in process  Labs reviewed in EPIC      Review of Systems  Constitutional, HEENT, cardiovascular, pulmonary, gi and gu systems are negative, except as otherwise noted.     Objective    Exam  /70 (BP Location: Right arm, Patient Position: Sitting, Cuff Size: Adult Large)   Pulse 79   Temp 97.8  F (36.6  C) (Oral)   Resp 13   Ht 1.735 m (5' 8.3\")   Wt 76.2 kg (168 lb)   SpO2 98%   BMI 25.32 kg/m     Estimated body mass index is 25.32 kg/m  as calculated from the following:    Height as of this encounter: 1.735 m (5' 8.3\").    Weight as of this encounter: 76.2 kg (168 lb).    Physical Exam  GENERAL: alert and no distress  EYES: Eyes grossly normal to inspection, PERRL and conjunctivae and sclerae normal  HENT: " normal cephalic/atraumatic, ear canals and TM's normal, nose and mouth without ulcers or lesions, oropharynx clear, oral mucous membranes moist, the tongue has multiple dark spots/patches  NECK: no adenopathy, no asymmetry, masses, or scars  RESP: lungs clear to auscultation - no rales, rhonchi or wheezes  CV: regular rates and rhythm  ABDOMEN: soft, nontender, no hepatosplenomegaly, no masses and bowel sounds normal  MS: no gross musculoskeletal defects noted, no edema  SKIN: no suspicious lesions or rashes  PSYCH: mentation appears normal, affect normal/bright      Signed Electronically by: Donnell Arroyo PA-C

## 2024-03-01 ENCOUNTER — TRANSFERRED RECORDS (OUTPATIENT)
Dept: HEALTH INFORMATION MANAGEMENT | Facility: CLINIC | Age: 59
End: 2024-03-01
Payer: COMMERCIAL

## 2024-03-02 LAB
ANION GAP SERPL CALCULATED.3IONS-SCNC: 13 MMOL/L (ref 7–15)
BUN SERPL-MCNC: 15.1 MG/DL (ref 6–20)
CALCIUM SERPL-MCNC: 9.3 MG/DL (ref 8.6–10)
CHLORIDE SERPL-SCNC: 102 MMOL/L (ref 98–107)
CHOLEST SERPL-MCNC: 144 MG/DL
CREAT SERPL-MCNC: 1.02 MG/DL (ref 0.67–1.17)
CREAT UR-MCNC: 100 MG/DL
DEPRECATED HCO3 PLAS-SCNC: 24 MMOL/L (ref 22–29)
EGFRCR SERPLBLD CKD-EPI 2021: 86 ML/MIN/1.73M2
FASTING STATUS PATIENT QL REPORTED: NO
GLUCOSE SERPL-MCNC: 76 MG/DL (ref 70–99)
HDLC SERPL-MCNC: 42 MG/DL
LDLC SERPL CALC-MCNC: 84 MG/DL
MICROALBUMIN UR-MCNC: <12 MG/L
MICROALBUMIN/CREAT UR: NORMAL MG/G{CREAT}
NONHDLC SERPL-MCNC: 102 MG/DL
POTASSIUM SERPL-SCNC: 4.5 MMOL/L (ref 3.4–5.3)
PSA SERPL DL<=0.01 NG/ML-MCNC: 1.08 NG/ML (ref 0–3.5)
SODIUM SERPL-SCNC: 139 MMOL/L (ref 135–145)
TRIGL SERPL-MCNC: 88 MG/DL

## 2024-03-03 DIAGNOSIS — I10 HYPERTENSION GOAL BP (BLOOD PRESSURE) < 140/90: ICD-10-CM

## 2024-03-03 RX ORDER — LISINOPRIL/HYDROCHLOROTHIAZIDE 10-12.5 MG
1 TABLET ORAL DAILY
Qty: 90 TABLET | Refills: 1 | Status: SHIPPED | OUTPATIENT
Start: 2024-03-03 | End: 2024-07-29

## 2024-03-28 ENCOUNTER — ALLIED HEALTH/NURSE VISIT (OUTPATIENT)
Dept: FAMILY MEDICINE | Facility: CLINIC | Age: 59
End: 2024-03-28
Payer: COMMERCIAL

## 2024-03-28 DIAGNOSIS — Z23 NEED FOR VACCINATION: Primary | ICD-10-CM

## 2024-03-28 PROCEDURE — 90746 HEPB VACCINE 3 DOSE ADULT IM: CPT

## 2024-03-28 PROCEDURE — 99207 PR NO CHARGE NURSE ONLY: CPT

## 2024-03-28 PROCEDURE — 90471 IMMUNIZATION ADMIN: CPT

## 2024-03-28 NOTE — PROGRESS NOTES
Immunization History   Administered Date(s) Administered    COVID-19 12+ (2023-24) (Pfizer) 10/09/2023    COVID-19 Bivalent 18+ (Moderna) 02/28/2023    COVID-19 Monovalent 18+ (Moderna) 03/18/2021, 04/15/2021, 02/21/2022    Flu, Unspecified 09/29/2019    Hepatitis B, Adult 02/29/2024    Influenza (High Dose) 3 valent vaccine 09/29/2020    Influenza (IIV3) PF 11/22/2004, 11/28/2005, 11/11/2014    Influenza Vaccine 65+ (Fluzone HD) 09/29/2020    Influenza Vaccine >6 months,quad, PF 10/28/2018, 10/07/2021    Influenza Vaccine, 6+MO IM (QUADRIVALENT W/PRESERVATIVES) 09/29/2019    Influenza, seasonal, injectable, PF 10/28/2016, 10/19/2017    Influenza,INJ,MDCK,PF,Quad >6mo(Flucelvax) 09/29/2020, 10/11/2022, 10/09/2023    Pneumococcal 20 valent Conjugate (Prevnar 20) 02/28/2023    TD,PF 7+ (Tenivac) 02/24/2010, 12/31/2020    Zoster recombinant adjuvanted (SHINGRIX) 05/15/2019, 07/17/2019     Prior to immunization administration, verified patients identity using patient s name and date of birth. Please see Immunization Activity for additional information.     Screening Questionnaire for Adult Immunization    Are you sick today?   No   Do you have allergies to medications, food, a vaccine component or latex?   No   Have you ever had a serious reaction after receiving a vaccination?   No   Do you have a long-term health problem with heart, lung, kidney, or metabolic disease (e.g., diabetes), asthma, a blood disorder, no spleen, complement component deficiency, a cochlear implant, or a spinal fluid leak?  Are you on long-term aspirin therapy?   No   Do you have cancer, leukemia, HIV/AIDS, or any other immune system problem?   No   Do you have a parent, brother, or sister with an immune system problem?   No   In the past 3 months, have you taken medications that affect  your immune system, such as prednisone, other steroids, or anticancer drugs; drugs for the treatment of rheumatoid arthritis, Crohn s disease, or psoriasis; or  have you had radiation treatments?   No   Have you had a seizure, or a brain or other nervous system problem?   No   During the past year, have you received a transfusion of blood or blood    products, or been given immune (gamma) globulin or antiviral drug?   No   For women: Are you pregnant or is there a chance you could become       pregnant during the next month?   No   Have you received any vaccinations in the past 4 weeks?   No     Immunization questionnaire answers were all negative.    I have reviewed the following standing orders:   This patient is due and qualifies for the Hepatitis B vaccine.    Click here for Hepatitis B Standing Order    I have reviewed the vaccines inclusion and exclusion criteria; No concerns regarding eligibility.     Patient instructed to remain in clinic for 15 minutes afterwards, and to report any adverse reactions.     Screening performed by Tiffanie White on 3/28/2024 at 1:30 PM.

## 2024-05-15 ENCOUNTER — TRANSFERRED RECORDS (OUTPATIENT)
Dept: HEALTH INFORMATION MANAGEMENT | Facility: CLINIC | Age: 59
End: 2024-05-15
Payer: COMMERCIAL

## 2024-05-27 DIAGNOSIS — I10 HYPERTENSION GOAL BP (BLOOD PRESSURE) < 140/90: ICD-10-CM

## 2024-05-28 ENCOUNTER — LAB (OUTPATIENT)
Dept: LAB | Facility: CLINIC | Age: 59
End: 2024-05-28
Payer: COMMERCIAL

## 2024-05-28 DIAGNOSIS — G35 MULTIPLE SCLEROSIS (H): Primary | ICD-10-CM

## 2024-05-28 DIAGNOSIS — G35 MULTIPLE SCLEROSIS (H): ICD-10-CM

## 2024-05-28 LAB
BASOPHILS # BLD AUTO: 0 10E3/UL (ref 0–0.2)
BASOPHILS NFR BLD AUTO: 0 %
EOSINOPHIL # BLD AUTO: 0.2 10E3/UL (ref 0–0.7)
EOSINOPHIL NFR BLD AUTO: 3 %
ERYTHROCYTE [DISTWIDTH] IN BLOOD BY AUTOMATED COUNT: 12.3 % (ref 10–15)
HCT VFR BLD AUTO: 45.3 % (ref 40–53)
HGB BLD-MCNC: 15.3 G/DL (ref 13.3–17.7)
IMM GRANULOCYTES # BLD: 0 10E3/UL
IMM GRANULOCYTES NFR BLD: 0 %
LYMPHOCYTES # BLD AUTO: 2.2 10E3/UL (ref 0.8–5.3)
LYMPHOCYTES NFR BLD AUTO: 30 %
MCH RBC QN AUTO: 29.5 PG (ref 26.5–33)
MCHC RBC AUTO-ENTMCNC: 33.8 G/DL (ref 31.5–36.5)
MCV RBC AUTO: 88 FL (ref 78–100)
MONOCYTES # BLD AUTO: 0.6 10E3/UL (ref 0–1.3)
MONOCYTES NFR BLD AUTO: 9 %
NEUTROPHILS # BLD AUTO: 4.1 10E3/UL (ref 1.6–8.3)
NEUTROPHILS NFR BLD AUTO: 58 %
PLATELET # BLD AUTO: 261 10E3/UL (ref 150–450)
RBC # BLD AUTO: 5.18 10E6/UL (ref 4.4–5.9)
WBC # BLD AUTO: 7.1 10E3/UL (ref 4–11)

## 2024-05-28 PROCEDURE — 86360 T CELL ABSOLUTE COUNT/RATIO: CPT

## 2024-05-28 PROCEDURE — 80076 HEPATIC FUNCTION PANEL: CPT

## 2024-05-28 PROCEDURE — 82785 ASSAY OF IGE: CPT

## 2024-05-28 PROCEDURE — 82784 ASSAY IGA/IGD/IGG/IGM EACH: CPT

## 2024-05-28 PROCEDURE — 85025 COMPLETE CBC W/AUTO DIFF WBC: CPT

## 2024-05-28 PROCEDURE — 86359 T CELLS TOTAL COUNT: CPT

## 2024-05-28 PROCEDURE — 36415 COLL VENOUS BLD VENIPUNCTURE: CPT

## 2024-05-28 RX ORDER — VERAPAMIL HYDROCHLORIDE 180 MG/1
180 TABLET, EXTENDED RELEASE ORAL AT BEDTIME
Qty: 90 TABLET | Refills: 2 | Status: SHIPPED | OUTPATIENT
Start: 2024-05-28

## 2024-05-29 LAB
ALBUMIN SERPL BCG-MCNC: 4.7 G/DL (ref 3.5–5.2)
ALP SERPL-CCNC: 64 U/L (ref 40–150)
ALT SERPL W P-5'-P-CCNC: 23 U/L (ref 0–70)
AST SERPL W P-5'-P-CCNC: 22 U/L (ref 0–45)
BILIRUB DIRECT SERPL-MCNC: <0.2 MG/DL (ref 0–0.3)
BILIRUB SERPL-MCNC: 0.3 MG/DL
CD3 CELLS # BLD: 1763 CELLS/UL (ref 603–2990)
CD3 CELLS NFR BLD: 84 % (ref 49–84)
CD3+CD4+ CELLS # BLD: 1275 CELLS/UL (ref 441–2156)
CD3+CD4+ CELLS NFR BLD: 60 % (ref 28–63)
CD3+CD4+ CELLS/CD3+CD8+ CLL BLD: 2.65 % (ref 1.4–2.6)
CD3+CD8+ CELLS # BLD: 481 CELLS/UL (ref 125–1312)
CD3+CD8+ CELLS NFR BLD: 23 % (ref 10–40)
IGA SERPL-MCNC: 80 MG/DL (ref 84–499)
IGG SERPL-MCNC: 667 MG/DL (ref 610–1616)
IGM SERPL-MCNC: 43 MG/DL (ref 35–242)
PROT SERPL-MCNC: 6.8 G/DL (ref 6.4–8.3)
T CELL COMMENT: ABNORMAL

## 2024-05-31 ENCOUNTER — MYC MEDICAL ADVICE (OUTPATIENT)
Dept: FAMILY MEDICINE | Facility: CLINIC | Age: 59
End: 2024-05-31
Payer: COMMERCIAL

## 2024-05-31 LAB — IGE SERPL-ACNC: 119 KU/L (ref 0–114)

## 2024-07-16 ENCOUNTER — TRANSFERRED RECORDS (OUTPATIENT)
Dept: HEALTH INFORMATION MANAGEMENT | Facility: CLINIC | Age: 59
End: 2024-07-16
Payer: COMMERCIAL

## 2024-07-27 DIAGNOSIS — I10 HYPERTENSION GOAL BP (BLOOD PRESSURE) < 140/90: ICD-10-CM

## 2024-07-29 RX ORDER — LISINOPRIL/HYDROCHLOROTHIAZIDE 10-12.5 MG
1 TABLET ORAL DAILY
Qty: 90 TABLET | Refills: 1 | Status: SHIPPED | OUTPATIENT
Start: 2024-07-29

## 2024-08-21 ENCOUNTER — TRANSFERRED RECORDS (OUTPATIENT)
Dept: HEALTH INFORMATION MANAGEMENT | Facility: CLINIC | Age: 59
End: 2024-08-21
Payer: COMMERCIAL

## 2024-09-23 ENCOUNTER — ALLIED HEALTH/NURSE VISIT (OUTPATIENT)
Dept: FAMILY MEDICINE | Facility: CLINIC | Age: 59
End: 2024-09-23
Payer: COMMERCIAL

## 2024-09-23 DIAGNOSIS — Z23 ENCOUNTER FOR IMMUNIZATION: Primary | ICD-10-CM

## 2024-09-23 PROBLEM — Z92.29 HAS RECEIVED THIRD DOSE OF HEPATITIS B VACCINE: Status: ACTIVE | Noted: 2024-09-23

## 2024-09-23 PROCEDURE — 99207 PR NO CHARGE NURSE ONLY: CPT

## 2024-09-23 PROCEDURE — 90746 HEPB VACCINE 3 DOSE ADULT IM: CPT

## 2024-09-23 PROCEDURE — 90471 IMMUNIZATION ADMIN: CPT

## 2024-09-23 NOTE — PROGRESS NOTES
Prior to immunization administration, verified patients identity using patient s name and date of birth. Please see Immunization Activity for additional information.     Is the patient's temperature normal (100.5 or less)? Yes     Patient MEETS CRITERIA. PROCEED with vaccine administration.      Patient instructed to remain in clinic for 15 minutes afterwards, and to report any adverse reactions.      Link to Ancillary Visit Immunization Standing Orders SmartSet     Screening performed by Kelly Yo MA on 9/23/2024 at 3:32

## 2024-09-23 NOTE — PROGRESS NOTES
Prior to immunization administration, verified patients identity using patient s name and date of birth. Please see Immunization Activity for additional information.     Is the patient's temperature normal (100.5 or less)? Yes     Patient MEETS CRITERIA. PROCEED with vaccine administration.      Patient instructed to remain in clinic for 15 minutes afterwards, and to report any adverse reactions.      Link to Ancillary Visit Immunization Standing Orders SmartSet     Screening performed by Kelly Yo MA on 9/23/2024 at 3:25 PM.

## 2024-11-22 ENCOUNTER — TRANSFERRED RECORDS (OUTPATIENT)
Dept: HEALTH INFORMATION MANAGEMENT | Facility: CLINIC | Age: 59
End: 2024-11-22
Payer: COMMERCIAL

## 2024-12-18 ENCOUNTER — TRANSFERRED RECORDS (OUTPATIENT)
Dept: HEALTH INFORMATION MANAGEMENT | Facility: CLINIC | Age: 59
End: 2024-12-18
Payer: COMMERCIAL

## 2024-12-23 ENCOUNTER — LAB (OUTPATIENT)
Dept: LAB | Facility: CLINIC | Age: 59
End: 2024-12-23
Payer: COMMERCIAL

## 2024-12-23 DIAGNOSIS — G35 MULTIPLE SCLEROSIS (H): Primary | ICD-10-CM

## 2024-12-23 LAB
ALBUMIN SERPL BCG-MCNC: 4.6 G/DL (ref 3.5–5.2)
ALP SERPL-CCNC: 61 U/L (ref 40–150)
ALT SERPL W P-5'-P-CCNC: 22 U/L (ref 0–70)
AST SERPL W P-5'-P-CCNC: 23 U/L (ref 0–45)
BASOPHILS # BLD AUTO: 0 10E3/UL (ref 0–0.2)
BASOPHILS NFR BLD AUTO: 0 %
BILIRUB DIRECT SERPL-MCNC: <0.2 MG/DL (ref 0–0.3)
BILIRUB SERPL-MCNC: 0.4 MG/DL
EOSINOPHIL # BLD AUTO: 0.2 10E3/UL (ref 0–0.7)
EOSINOPHIL NFR BLD AUTO: 2 %
ERYTHROCYTE [DISTWIDTH] IN BLOOD BY AUTOMATED COUNT: 11.8 % (ref 10–15)
HCT VFR BLD AUTO: 43.4 % (ref 40–53)
HGB BLD-MCNC: 15 G/DL (ref 13.3–17.7)
IMM GRANULOCYTES # BLD: 0 10E3/UL
IMM GRANULOCYTES NFR BLD: 0 %
LYMPHOCYTES # BLD AUTO: 2.3 10E3/UL (ref 0.8–5.3)
LYMPHOCYTES NFR BLD AUTO: 31 %
MCH RBC QN AUTO: 29.7 PG (ref 26.5–33)
MCHC RBC AUTO-ENTMCNC: 34.6 G/DL (ref 31.5–36.5)
MCV RBC AUTO: 86 FL (ref 78–100)
MONOCYTES # BLD AUTO: 0.7 10E3/UL (ref 0–1.3)
MONOCYTES NFR BLD AUTO: 10 %
NEUTROPHILS # BLD AUTO: 4 10E3/UL (ref 1.6–8.3)
NEUTROPHILS NFR BLD AUTO: 56 %
PLATELET # BLD AUTO: 280 10E3/UL (ref 150–450)
PROT SERPL-MCNC: 7 G/DL (ref 6.4–8.3)
RBC # BLD AUTO: 5.05 10E6/UL (ref 4.4–5.9)
WBC # BLD AUTO: 7.2 10E3/UL (ref 4–11)

## 2024-12-23 PROCEDURE — 36415 COLL VENOUS BLD VENIPUNCTURE: CPT

## 2024-12-23 PROCEDURE — 85025 COMPLETE CBC W/AUTO DIFF WBC: CPT

## 2024-12-23 PROCEDURE — 80076 HEPATIC FUNCTION PANEL: CPT

## 2024-12-23 PROCEDURE — 82784 ASSAY IGA/IGD/IGG/IGM EACH: CPT

## 2024-12-24 LAB
IGA SERPL-MCNC: 78 MG/DL (ref 84–499)
IGG SERPL-MCNC: 659 MG/DL (ref 610–1616)
IGM SERPL-MCNC: 55 MG/DL (ref 35–242)

## 2025-01-11 ENCOUNTER — MYC MEDICAL ADVICE (OUTPATIENT)
Dept: FAMILY MEDICINE | Facility: CLINIC | Age: 60
End: 2025-01-11
Payer: COMMERCIAL

## 2025-01-11 DIAGNOSIS — I10 HYPERTENSION GOAL BP (BLOOD PRESSURE) < 140/90: ICD-10-CM

## 2025-01-14 RX ORDER — LISINOPRIL AND HYDROCHLOROTHIAZIDE 10; 12.5 MG/1; MG/1
1 TABLET ORAL DAILY
Qty: 90 TABLET | Refills: 0 | Status: SHIPPED | OUTPATIENT
Start: 2025-01-14

## 2025-02-10 ENCOUNTER — TRANSFERRED RECORDS (OUTPATIENT)
Dept: HEALTH INFORMATION MANAGEMENT | Facility: CLINIC | Age: 60
End: 2025-02-10
Payer: COMMERCIAL

## 2025-02-19 DIAGNOSIS — I10 HYPERTENSION GOAL BP (BLOOD PRESSURE) < 140/90: ICD-10-CM

## 2025-02-19 RX ORDER — VERAPAMIL HYDROCHLORIDE 180 MG/1
180 TABLET, FILM COATED, EXTENDED RELEASE ORAL AT BEDTIME
Qty: 90 TABLET | Refills: 2 | Status: SHIPPED | OUTPATIENT
Start: 2025-02-19

## 2025-02-23 ENCOUNTER — MYC REFILL (OUTPATIENT)
Dept: FAMILY MEDICINE | Facility: CLINIC | Age: 60
End: 2025-02-23
Payer: COMMERCIAL

## 2025-02-23 DIAGNOSIS — I10 HYPERTENSION GOAL BP (BLOOD PRESSURE) < 140/90: ICD-10-CM

## 2025-02-23 DIAGNOSIS — K25.9 NSAID-INDUCED GASTRIC ULCER: ICD-10-CM

## 2025-02-23 DIAGNOSIS — T39.395A NSAID-INDUCED GASTRIC ULCER: ICD-10-CM

## 2025-02-24 NOTE — TELEPHONE ENCOUNTER
Omeprazole: Clinic RN: Please investigate patient's chart or contact patient if the information cannot be found because patient should have run out of this medication on 2 years ago. Confirm patient is taking this medication as prescribed. Document findings and route refill encounter to provider for approval or denial.    For change in pharmacy request, please route to PCP. Refill RN unable to refill due to drug interaction warning.     Chintan VORA RN 2/24/2025 at 10:12 AM

## 2025-02-24 NOTE — TELEPHONE ENCOUNTER
Omeprazole was previously prescribed by GI. Routing to PCP to review requests.    Areli Deluna RN on 2/24/2025 at 3:53 PM

## 2025-02-25 RX ORDER — OMEPRAZOLE 40 MG/1
40 CAPSULE, DELAYED RELEASE ORAL DAILY
Qty: 90 CAPSULE | Refills: 1 | OUTPATIENT
Start: 2025-02-25

## 2025-02-25 RX ORDER — VERAPAMIL HYDROCHLORIDE 180 MG/1
180 TABLET, FILM COATED, EXTENDED RELEASE ORAL AT BEDTIME
Qty: 90 TABLET | Refills: 2 | Status: SHIPPED | OUTPATIENT
Start: 2025-02-25

## 2025-03-26 SDOH — HEALTH STABILITY: PHYSICAL HEALTH: ON AVERAGE, HOW MANY MINUTES DO YOU ENGAGE IN EXERCISE AT THIS LEVEL?: 30 MIN

## 2025-03-26 SDOH — HEALTH STABILITY: PHYSICAL HEALTH: ON AVERAGE, HOW MANY DAYS PER WEEK DO YOU ENGAGE IN MODERATE TO STRENUOUS EXERCISE (LIKE A BRISK WALK)?: 3 DAYS

## 2025-03-26 ASSESSMENT — SOCIAL DETERMINANTS OF HEALTH (SDOH): HOW OFTEN DO YOU GET TOGETHER WITH FRIENDS OR RELATIVES?: ONCE A WEEK

## 2025-04-01 ENCOUNTER — OFFICE VISIT (OUTPATIENT)
Dept: FAMILY MEDICINE | Facility: CLINIC | Age: 60
End: 2025-04-01
Payer: COMMERCIAL

## 2025-04-01 VITALS
WEIGHT: 170 LBS | SYSTOLIC BLOOD PRESSURE: 131 MMHG | TEMPERATURE: 98.2 F | HEART RATE: 83 BPM | BODY MASS INDEX: 25.76 KG/M2 | DIASTOLIC BLOOD PRESSURE: 85 MMHG | OXYGEN SATURATION: 98 % | RESPIRATION RATE: 13 BRPM | HEIGHT: 68 IN

## 2025-04-01 DIAGNOSIS — Z00.00 ROUTINE GENERAL MEDICAL EXAMINATION AT A HEALTH CARE FACILITY: Primary | ICD-10-CM

## 2025-04-01 DIAGNOSIS — I10 HYPERTENSION GOAL BP (BLOOD PRESSURE) < 140/90: ICD-10-CM

## 2025-04-01 DIAGNOSIS — Z12.11 SCREEN FOR COLON CANCER: ICD-10-CM

## 2025-04-01 DIAGNOSIS — J45.30 MILD PERSISTENT ASTHMA WITHOUT COMPLICATION: ICD-10-CM

## 2025-04-01 DIAGNOSIS — Z13.6 CARDIOVASCULAR SCREENING; LDL GOAL LESS THAN 160: ICD-10-CM

## 2025-04-01 DIAGNOSIS — G35 MS (MULTIPLE SCLEROSIS) (H): ICD-10-CM

## 2025-04-01 DIAGNOSIS — K22.70 BARRETT'S ESOPHAGUS WITHOUT DYSPLASIA: ICD-10-CM

## 2025-04-01 PROCEDURE — 80061 LIPID PANEL: CPT | Performed by: PHYSICIAN ASSISTANT

## 2025-04-01 PROCEDURE — 3079F DIAST BP 80-89 MM HG: CPT | Performed by: PHYSICIAN ASSISTANT

## 2025-04-01 PROCEDURE — 3075F SYST BP GE 130 - 139MM HG: CPT | Performed by: PHYSICIAN ASSISTANT

## 2025-04-01 PROCEDURE — 99214 OFFICE O/P EST MOD 30 MIN: CPT | Mod: 25 | Performed by: PHYSICIAN ASSISTANT

## 2025-04-01 PROCEDURE — 1126F AMNT PAIN NOTED NONE PRSNT: CPT | Performed by: PHYSICIAN ASSISTANT

## 2025-04-01 PROCEDURE — G2211 COMPLEX E/M VISIT ADD ON: HCPCS | Performed by: PHYSICIAN ASSISTANT

## 2025-04-01 PROCEDURE — 82570 ASSAY OF URINE CREATININE: CPT | Performed by: PHYSICIAN ASSISTANT

## 2025-04-01 PROCEDURE — 99396 PREV VISIT EST AGE 40-64: CPT | Performed by: PHYSICIAN ASSISTANT

## 2025-04-01 PROCEDURE — 82043 UR ALBUMIN QUANTITATIVE: CPT | Performed by: PHYSICIAN ASSISTANT

## 2025-04-01 PROCEDURE — 80048 BASIC METABOLIC PNL TOTAL CA: CPT | Performed by: PHYSICIAN ASSISTANT

## 2025-04-01 PROCEDURE — 36415 COLL VENOUS BLD VENIPUNCTURE: CPT | Performed by: PHYSICIAN ASSISTANT

## 2025-04-01 RX ORDER — MONTELUKAST SODIUM 10 MG/1
1 TABLET ORAL AT BEDTIME
Qty: 90 TABLET | Refills: 3 | Status: SHIPPED | OUTPATIENT
Start: 2025-04-01

## 2025-04-01 RX ORDER — CALCIUM CARBONATE 300MG(750)
400 TABLET,CHEWABLE ORAL DAILY
COMMUNITY
Start: 2025-04-01

## 2025-04-01 RX ORDER — LISINOPRIL AND HYDROCHLOROTHIAZIDE 10; 12.5 MG/1; MG/1
1 TABLET ORAL DAILY
Qty: 90 TABLET | Refills: 2 | Status: SHIPPED | OUTPATIENT
Start: 2025-04-01

## 2025-04-01 RX ORDER — ALBUTEROL SULFATE 90 UG/1
1-2 INHALANT RESPIRATORY (INHALATION) EVERY 6 HOURS PRN
Qty: 8 G | Refills: 1 | Status: SHIPPED | OUTPATIENT
Start: 2025-04-01

## 2025-04-01 ASSESSMENT — PAIN SCALES - GENERAL: PAINLEVEL_OUTOF10: NO PAIN (0)

## 2025-04-01 NOTE — PATIENT INSTRUCTIONS
Patient Education   Preventive Care Advice   This is general advice given by our system to help you stay healthy. However, your care team may have specific advice just for you. Please talk to your care team about your preventive care needs.  Nutrition  Eat 5 or more servings of fruits and vegetables each day.  Try wheat bread, brown rice and whole grain pasta (instead of white bread, rice, and pasta).  Get enough calcium and vitamin D. Check the label on foods and aim for 100% of the RDA (recommended daily allowance).  Lifestyle  Exercise at least 150 minutes each week  (30 minutes a day, 5 days a week).  Do muscle strengthening activities 2 days a week. These help control your weight and prevent disease.  No smoking.  Wear sunscreen to prevent skin cancer.  Have a dental exam and cleaning every 6 months.  Yearly exams  See your health care team every year to talk about:  Any changes in your health.  Any medicines your care team has prescribed.  Preventive care, family planning, and ways to prevent chronic diseases.  Shots (vaccines)   HPV shots (up to age 26), if you've never had them before.  Hepatitis B shots (up to age 59), if you've never had them before.  COVID-19 shot: Get this shot when it's due.  Flu shot: Get a flu shot every year.  Tetanus shot: Get a tetanus shot every 10 years.  Pneumococcal, hepatitis A, and RSV shots: Ask your care team if you need these based on your risk.  Shingles shot (for age 50 and up)  General health tests  Diabetes screening:  Starting at age 35, Get screened for diabetes at least every 3 years.  If you are younger than age 35, ask your care team if you should be screened for diabetes.  Cholesterol test: At age 39, start having a cholesterol test every 5 years, or more often if advised.  Bone density scan (DEXA): At age 50, ask your care team if you should have this scan for osteoporosis (brittle bones).  Hepatitis C: Get tested at least once in your life.  STIs (sexually  transmitted infections)  Before age 24: Ask your care team if you should be screened for STIs.  After age 24: Get screened for STIs if you're at risk. You are at risk for STIs (including HIV) if:  You are sexually active with more than one person.  You don't use condoms every time.  You or a partner was diagnosed with a sexually transmitted infection.  If you are at risk for HIV, ask about PrEP medicine to prevent HIV.  Get tested for HIV at least once in your life, whether you are at risk for HIV or not.  Cancer screening tests  Cervical cancer screening: If you have a cervix, begin getting regular cervical cancer screening tests starting at age 21.  Breast cancer scan (mammogram): If you've ever had breasts, begin having regular mammograms starting at age 40. This is a scan to check for breast cancer.  Colon cancer screening: It is important to start screening for colon cancer at age 45.  Have a colonoscopy test every 10 years (or more often if you're at risk) Or, ask your provider about stool tests like a FIT test every year or Cologuard test every 3 years.  To learn more about your testing options, visit:   .  For help making a decision, visit:   https://bit.ly/ru21608.  Prostate cancer screening test: If you have a prostate, ask your care team if a prostate cancer screening test (PSA) at age 55 is right for you.  Lung cancer screening: If you are a current or former smoker ages 50 to 80, ask your care team if ongoing lung cancer screenings are right for you.  For informational purposes only. Not to replace the advice of your health care provider. Copyright   2023 [employer]. All rights reserved. Clinically reviewed by the [facility]. [employer] 112450 - REV 01/24.

## 2025-04-01 NOTE — PROGRESS NOTES
"Preventive Care Visit  [facility] MAHENDRA Arroyo PA-C, Family Medicine  Apr 1, 2025      Assessment & Plan     Routine general medical examination at a health care facility  Reviewed personal and family history. Reviewed age appropriate screenings. Recommended any needed vaccinations.       Hypertension goal BP (blood pressure) < 140/90  At goal. Continue present management.   - BASIC METABOLIC PANEL; Future  - lisinopril-hydrochlorothiazide (ZESTORETIC) 10-12.5 MG tablet; Take 1 tablet by mouth daily.  - Albumin Random Urine Quantitative with Creat Ratio; Future    Screen for colon cancer  He is going to confirm with [facility] the next recommended screening date. We are showing as due but he was thinking it was pushed back some    Mild persistent asthma without complication  Stable without concern. Continue present management.   - montelukast (SINGULAIR) 10 MG tablet; Take 1 tablet (10 mg) by mouth at bedtime.  - albuterol (PROVENTIL HFA) 108 (90 Base) MCG/ACT inhaler; Inhale 1-2 puffs into the lungs every 6 hours as needed for shortness of breath.    CARDIOVASCULAR SCREENING; LDL GOAL LESS THAN 160  updating  - Lipid panel reflex to direct LDL Non-fasting; Future    MS (multiple sclerosis) (H)  Stable on twice yearly ocrelizumab    The longitudinal plan of care for the diagnosis(es)/condition(s) as documented were addressed during this visit. Due to the added complexity in care, I will continue to support Sterling in the subsequent management and with ongoing continuity of care.          BMI  Estimated body mass index is 25.66 kg/m  as calculated from the following:    Height as of this encounter: 1.734 m (5' 8.25\").    Weight as of this encounter: 77.1 kg (170 lb).       Counseling  Appropriate preventive services were addressed with this patient via screening, questionnaire, or discussion as appropriate for fall prevention, nutrition, physical activity, Tobacco-use cessation, social engagement, " weight loss and cognition.  Checklist reviewing preventive services available has been given to the patient.  Reviewed patient's diet, addressing concerns and/or questions.   He is at risk for lack of exercise and has been provided with information to increase physical activity for the benefit of his well-being.           Millicent Katz is a 60 year old, presenting for the following:  Physical        4/1/2025     3:41 PM   Additional Questions   Roomed by Teresa Law VF        No additional concerns.     HPI      Advance Care Planning  Patient does not have a Health Care Directive: Discussed advance care planning with patient; however, patient declined at this time.          3/26/2025   General Health   How would you rate your overall physical health? Good   Feel stress (tense, anxious, or unable to sleep) Only a little   (!) STRESS CONCERN      3/26/2025   Nutrition   Three or more servings of calcium each day? (!) NO   Diet: Carbohydrate counting    Other   If other, please elaborate: Intermittent fasting   How many servings of fruit and vegetables per day? (!) 2-3   How many sweetened beverages each day? 0-1       Multiple values from one day are sorted in reverse-chronological order         3/26/2025   Exercise   Days per week of moderate/strenous exercise 3 days   Average minutes spent exercising at this level 30 min         3/26/2025   Social Factors   Frequency of gathering with friends or relatives Once a week   Worry food won't last until get money to buy more No   Food not last or not have enough money for food? No   Do you have housing? (Housing is defined as stable permanent housing and does not include staying ouside in a car, in a tent, in an abandoned building, in an overnight shelter, or couch-surfing.) Yes   Are you worried about losing your housing? No   Lack of transportation? No   Unable to get utilities (heat,electricity)? No         3/26/2025   Fall Risk   Fallen 2 or more times in the past  year? No   Trouble with walking or balance? No          3/26/2025   Dental   Dentist two times every year? Yes           Today's PHQ-2 Score:       4/1/2025     9:06 AM   PHQ-2 ( 1999 [employer])   Q1: Little interest or pleasure in doing things 0   Q2: Feeling down, depressed or hopeless 0   PHQ-2 Score 0    Q1: Little interest or pleasure in doing things Not at all   Q2: Feeling down, depressed or hopeless Not at all   PHQ-2 Score 0       Patient-reported           3/26/2025   Substance Use   Alcohol more than 3/day or more than 7/wk No   Do you use any other substances recreationally? No     Social History     Tobacco Use    Smoking status: Never    Smokeless tobacco: Never   Vaping Use    Vaping status: Never Used   Substance Use Topics    Alcohol use: Yes     Alcohol/week: 0.0 - 1.7 standard drinks of alcohol     Comment: rarely    Drug use: No           3/26/2025   STI Screening   New sexual partner(s) since last STI/HIV test? No   Last PSA:   PSA   Date Value Ref Range Status   01/07/2020 1.06 0 - 4 ug/L Final     Comment:     Assay Method:  Chemiluminescence using Siemens Vista analyzer     Prostate Specific Antigen Screen   Date Value Ref Range Status   02/29/2024 1.08 0.00 - 3.50 ng/mL Final   02/21/2022 1.13 0.00 - 4.00 ug/L Final     ASCVD Risk   The 10-year ASCVD risk score (Michelle HERCULES, et al., 2019) is: 7.3%    Values used to calculate the score:      Age: 60 years      Sex: Male      Is Non- : No      Diabetic: No      Tobacco smoker: No      Systolic Blood Pressure: 131 mmHg      Is BP treated: Yes      HDL Cholesterol: 42 mg/dL      Total Cholesterol: 144 mg/dL           Reviewed and updated as needed this visit by Provider                    Lab work is in process  Labs reviewed in EPIC      Review of Systems  Constitutional, HEENT, cardiovascular, pulmonary, gi and gu systems are negative, except as otherwise noted.     Objective    Exam  /85 (BP Location: Right  "arm, Patient Position: Sitting, Cuff Size: Adult Large)   Pulse 83   Temp 98.2  F (36.8  C) (Oral)   Resp 13   Ht 1.734 m (5' 8.25\")   Wt 77.1 kg (170 lb)   SpO2 98%   BMI 25.66 kg/m     Estimated body mass index is 25.66 kg/m  as calculated from the following:    Height as of this encounter: 1.734 m (5' 8.25\").    Weight as of this encounter: 77.1 kg (170 lb).    Physical Exam  GENERAL: alert and no distress  EYES: Eyes grossly normal to inspection, PERRL and conjunctivae and sclerae normal  HENT: ear canals and TM's normal, nose and mouth without ulcers or lesions  NECK: no adenopathy, no asymmetry, masses, or scars  RESP: lungs clear to auscultation - no rales, rhonchi or wheezes  CV: regular rate and rhythm, normal S1 S2, no S3 or S4, no murmur, click or rub, no peripheral edema  ABDOMEN: soft, nontender, no hepatosplenomegaly, no masses and bowel sounds normal  MS: No peripheral edema   SKIN: no suspicious lesions or rashes  PSYCH: mentation appears normal, affect normal/bright        Signed Electronically by: Donnell Arroyo PA-C    "

## 2025-04-02 LAB
ANION GAP SERPL CALCULATED.3IONS-SCNC: 13 MMOL/L (ref 7–15)
BUN SERPL-MCNC: 14.7 MG/DL (ref 6–20)
CALCIUM SERPL-MCNC: 10.1 MG/DL (ref 8.8–10.4)
CHLORIDE SERPL-SCNC: 101 MMOL/L (ref 98–107)
CHOLEST SERPL-MCNC: 201 MG/DL
CREAT SERPL-MCNC: 1.15 MG/DL (ref 0.67–1.17)
CREAT UR-MCNC: 58.6 MG/DL
EGFRCR SERPLBLD CKD-EPI 2021: 74 ML/MIN/1.73M2
FASTING STATUS PATIENT QL REPORTED: NO
FASTING STATUS PATIENT QL REPORTED: NO
GLUCOSE SERPL-MCNC: 88 MG/DL (ref 70–99)
HCO3 SERPL-SCNC: 26 MMOL/L (ref 22–29)
HDLC SERPL-MCNC: 46 MG/DL
LDLC SERPL CALC-MCNC: 129 MG/DL
MICROALBUMIN UR-MCNC: <12 MG/L
MICROALBUMIN/CREAT UR: NORMAL MG/G{CREAT}
NONHDLC SERPL-MCNC: 155 MG/DL
POTASSIUM SERPL-SCNC: 4.1 MMOL/L (ref 3.4–5.3)
SODIUM SERPL-SCNC: 140 MMOL/L (ref 135–145)
TRIGL SERPL-MCNC: 131 MG/DL

## 2025-04-03 DIAGNOSIS — Z13.6 CARDIOVASCULAR SCREENING; LDL GOAL LESS THAN 160: Primary | ICD-10-CM

## 2025-05-19 ENCOUNTER — LAB (OUTPATIENT)
Dept: LAB | Facility: CLINIC | Age: 60
End: 2025-05-19
Payer: COMMERCIAL

## 2025-05-19 DIAGNOSIS — D84.821 DRUG-INDUCED IMMUNODEFICIENCY: ICD-10-CM

## 2025-05-19 DIAGNOSIS — Z79.899 DRUG-INDUCED IMMUNODEFICIENCY: ICD-10-CM

## 2025-05-19 DIAGNOSIS — Z79.60 LONG-TERM USE OF IMMUNOSUPPRESSANT MEDICATION: ICD-10-CM

## 2025-05-19 DIAGNOSIS — Z51.81 ENCOUNTER FOR THERAPEUTIC DRUG MONITORING: ICD-10-CM

## 2025-05-19 DIAGNOSIS — G35 MULTIPLE SCLEROSIS (H): Primary | ICD-10-CM

## 2025-05-19 LAB
ALBUMIN SERPL BCG-MCNC: 4.5 G/DL (ref 3.5–5.2)
ALP SERPL-CCNC: 62 U/L (ref 40–150)
ALT SERPL W P-5'-P-CCNC: 17 U/L (ref 0–70)
AST SERPL W P-5'-P-CCNC: 21 U/L (ref 0–45)
BILIRUB SERPL-MCNC: 0.4 MG/DL
BILIRUBIN DIRECT (ROCHE PRO & PURE): 0.18 MG/DL (ref 0–0.45)
PROT SERPL-MCNC: 6.8 G/DL (ref 6.4–8.3)

## 2025-05-19 PROCEDURE — 36415 COLL VENOUS BLD VENIPUNCTURE: CPT

## 2025-05-19 PROCEDURE — 86355 B CELLS TOTAL COUNT: CPT

## 2025-05-19 PROCEDURE — 86357 NK CELLS TOTAL COUNT: CPT

## 2025-05-19 PROCEDURE — 82784 ASSAY IGA/IGD/IGG/IGM EACH: CPT

## 2025-05-19 PROCEDURE — 80076 HEPATIC FUNCTION PANEL: CPT

## 2025-05-19 PROCEDURE — 86360 T CELL ABSOLUTE COUNT/RATIO: CPT

## 2025-05-19 PROCEDURE — 86359 T CELLS TOTAL COUNT: CPT

## 2025-05-20 LAB
CD19 CELLS # BLD: <1 CELLS/UL (ref 107–698)
CD19 CELLS NFR BLD: <1 % (ref 6–27)
CD3 CELLS # BLD: 1604 CELLS/UL (ref 603–2990)
CD3 CELLS NFR BLD: 90 % (ref 49–84)
CD3+CD4+ CELLS # BLD: 1287 CELLS/UL (ref 441–2156)
CD3+CD4+ CELLS NFR BLD: 72 % (ref 28–63)
CD3+CD4+ CELLS/CD3+CD8+ CLL BLD: 4.14 % (ref 1.4–2.6)
CD3+CD8+ CELLS # BLD: 311 CELLS/UL (ref 125–1312)
CD3+CD8+ CELLS NFR BLD: 17 % (ref 10–40)
CD3-CD16+CD56+ CELLS # BLD: 174 CELLS/UL (ref 95–640)
CD3-CD16+CD56+ CELLS NFR BLD: 10 % (ref 4–25)
IGA SERPL-MCNC: 73 MG/DL (ref 84–499)
IGG SERPL-MCNC: 549 MG/DL (ref 610–1616)
IGM SERPL-MCNC: 39 MG/DL (ref 35–242)
T CELL EXTENDED COMMENT: ABNORMAL

## 2025-05-29 RX ORDER — AMITRIPTYLINE HYDROCHLORIDE 10 MG/1
10 TABLET ORAL AT BEDTIME
Qty: 90 TABLET | OUTPATIENT
Start: 2025-05-29

## 2025-05-29 NOTE — TELEPHONE ENCOUNTER
Called pharmacy. They stated that pt already has a refill on file so they will just fill that and let pt know. No further questions or concerns.     Payton Mercer RN   [facility]

## 2025-07-28 ENCOUNTER — TRANSFERRED RECORDS (OUTPATIENT)
Dept: HEALTH INFORMATION MANAGEMENT | Facility: CLINIC | Age: 60
End: 2025-07-28
Payer: COMMERCIAL

## (undated) DEVICE — KIT ENDO TURNOVER/PROCEDURE W/CLEAN A SCOPE LINERS 103888

## (undated) DEVICE — ENDO FORCEP ENDOJAW BIOPSY 2.8MMX160CM FB-220K

## (undated) DEVICE — ENDO SNARE HEXAGONAL ISNARE 2.5X4.0CM W/25GA NDL